# Patient Record
Sex: FEMALE | Race: WHITE | NOT HISPANIC OR LATINO | URBAN - METROPOLITAN AREA
[De-identification: names, ages, dates, MRNs, and addresses within clinical notes are randomized per-mention and may not be internally consistent; named-entity substitution may affect disease eponyms.]

---

## 2017-04-07 ENCOUNTER — OUTPATIENT (OUTPATIENT)
Dept: OUTPATIENT SERVICES | Facility: HOSPITAL | Age: 37
LOS: 1 days | End: 2017-04-07
Payer: COMMERCIAL

## 2017-04-07 VITALS
SYSTOLIC BLOOD PRESSURE: 117 MMHG | RESPIRATION RATE: 16 BRPM | WEIGHT: 192.02 LBS | HEIGHT: 66.5 IN | DIASTOLIC BLOOD PRESSURE: 78 MMHG | TEMPERATURE: 98 F | HEART RATE: 84 BPM

## 2017-04-07 DIAGNOSIS — Z01.818 ENCOUNTER FOR OTHER PREPROCEDURAL EXAMINATION: ICD-10-CM

## 2017-04-07 DIAGNOSIS — N20.0 CALCULUS OF KIDNEY: ICD-10-CM

## 2017-04-07 DIAGNOSIS — Z98.89 OTHER SPECIFIED POSTPROCEDURAL STATES: Chronic | ICD-10-CM

## 2017-04-07 DIAGNOSIS — N20.0 CALCULUS OF KIDNEY: Chronic | ICD-10-CM

## 2017-04-07 LAB
ANION GAP SERPL CALC-SCNC: 9 MMOL/L — SIGNIFICANT CHANGE UP (ref 5–17)
APPEARANCE UR: ABNORMAL
BILIRUB UR-MCNC: NEGATIVE — SIGNIFICANT CHANGE UP
BUN SERPL-MCNC: 12 MG/DL — SIGNIFICANT CHANGE UP (ref 7–23)
CALCIUM SERPL-MCNC: 8.8 MG/DL — SIGNIFICANT CHANGE UP (ref 8.5–10.1)
CHLORIDE SERPL-SCNC: 108 MMOL/L — SIGNIFICANT CHANGE UP (ref 96–108)
CO2 SERPL-SCNC: 22 MMOL/L — SIGNIFICANT CHANGE UP (ref 22–31)
COLOR SPEC: YELLOW — SIGNIFICANT CHANGE UP
CREAT SERPL-MCNC: 0.8 MG/DL — SIGNIFICANT CHANGE UP (ref 0.5–1.3)
DIFF PNL FLD: ABNORMAL
GLUCOSE SERPL-MCNC: 94 MG/DL — SIGNIFICANT CHANGE UP (ref 70–99)
GLUCOSE UR QL: NEGATIVE — SIGNIFICANT CHANGE UP
HCG SERPL-ACNC: <1 MIU/ML — SIGNIFICANT CHANGE UP
HCT VFR BLD CALC: 36.7 % — SIGNIFICANT CHANGE UP (ref 34.5–45)
HGB BLD-MCNC: 11.7 G/DL — SIGNIFICANT CHANGE UP (ref 11.5–15.5)
KETONES UR-MCNC: NEGATIVE — SIGNIFICANT CHANGE UP
LEUKOCYTE ESTERASE UR-ACNC: ABNORMAL
MCHC RBC-ENTMCNC: 29 PG — SIGNIFICANT CHANGE UP (ref 27–34)
MCHC RBC-ENTMCNC: 31.8 GM/DL — LOW (ref 32–36)
MCV RBC AUTO: 91.1 FL — SIGNIFICANT CHANGE UP (ref 80–100)
NITRITE UR-MCNC: POSITIVE
PH UR: 5 — SIGNIFICANT CHANGE UP (ref 4.8–8)
PLATELET # BLD AUTO: 388 K/UL — SIGNIFICANT CHANGE UP (ref 150–400)
POTASSIUM SERPL-MCNC: 3.5 MMOL/L — SIGNIFICANT CHANGE UP (ref 3.5–5.3)
POTASSIUM SERPL-SCNC: 3.5 MMOL/L — SIGNIFICANT CHANGE UP (ref 3.5–5.3)
PROT UR-MCNC: 25 MG/DL
RBC # BLD: 4.02 M/UL — SIGNIFICANT CHANGE UP (ref 3.8–5.2)
RBC # FLD: 15 % — HIGH (ref 10.3–14.5)
SODIUM SERPL-SCNC: 139 MMOL/L — SIGNIFICANT CHANGE UP (ref 135–145)
SP GR SPEC: 1.01 — SIGNIFICANT CHANGE UP (ref 1.01–1.02)
UROBILINOGEN FLD QL: NEGATIVE — SIGNIFICANT CHANGE UP
WBC # BLD: 7.9 K/UL — SIGNIFICANT CHANGE UP (ref 3.8–10.5)
WBC # FLD AUTO: 7.9 K/UL — SIGNIFICANT CHANGE UP (ref 3.8–10.5)

## 2017-04-07 PROCEDURE — 81001 URINALYSIS AUTO W/SCOPE: CPT

## 2017-04-07 PROCEDURE — 86850 RBC ANTIBODY SCREEN: CPT

## 2017-04-07 PROCEDURE — 87086 URINE CULTURE/COLONY COUNT: CPT

## 2017-04-07 PROCEDURE — 87186 SC STD MICRODIL/AGAR DIL: CPT

## 2017-04-07 PROCEDURE — G0463: CPT

## 2017-04-07 PROCEDURE — 84702 CHORIONIC GONADOTROPIN TEST: CPT

## 2017-04-07 PROCEDURE — 86901 BLOOD TYPING SEROLOGIC RH(D): CPT

## 2017-04-07 PROCEDURE — 80048 BASIC METABOLIC PNL TOTAL CA: CPT

## 2017-04-07 PROCEDURE — 86900 BLOOD TYPING SEROLOGIC ABO: CPT

## 2017-04-07 PROCEDURE — 85027 COMPLETE CBC AUTOMATED: CPT

## 2017-04-07 NOTE — H&P PST ADULT - ASSESSMENT
35 y/o healthy female with right kidney stone, ureteral stent placed in OCT 2016, now for stent exchange and cystoscopy and possible ureteroscopy. pre op testing today. medical eval not needed.

## 2017-04-07 NOTE — H&P PST ADULT - HISTORY OF PRESENT ILLNESS
37 y/o healthy female with the diagnosis of right kidney stone in Oct 2016, was in the hospital for few days with hematuria, nausea and vomiting stent was placed attempted lithotripsy but failed. Was sent home with right ureteral stent. Was seen by Dr Peñaloza for follow up, was advised to have the stent replaced.  Pre op testing today.

## 2017-04-07 NOTE — H&P PST ADULT - RS GEN PE MLT RESP DETAILS PC
clear to auscultation bilaterally/airway patent/breath sounds equal/normal/respirations non-labored/good air movement

## 2017-04-07 NOTE — H&P PST ADULT - FAMILY HISTORY
Grandparent  Still living? No  Family history of type 2 diabetes mellitus, Age at diagnosis: Age Unknown     Mother  Still living? Yes, Estimated age: 61-70  Family history of renal cancer, Age at diagnosis: Age Unknown     Father  Still living? Yes, Estimated age: 61-70  Family history of type 2 diabetes mellitus, Age at diagnosis: Age Unknown

## 2017-04-10 ENCOUNTER — INPATIENT (INPATIENT)
Facility: HOSPITAL | Age: 37
LOS: 0 days | Discharge: HOME | DRG: 690 | End: 2017-04-11
Attending: HOSPITALIST | Admitting: HOSPITALIST
Payer: COMMERCIAL

## 2017-04-10 VITALS
TEMPERATURE: 99 F | HEART RATE: 102 BPM | OXYGEN SATURATION: 100 % | WEIGHT: 192.02 LBS | SYSTOLIC BLOOD PRESSURE: 117 MMHG | RESPIRATION RATE: 16 BRPM | HEIGHT: 66 IN | DIASTOLIC BLOOD PRESSURE: 77 MMHG

## 2017-04-10 DIAGNOSIS — N20.0 CALCULUS OF KIDNEY: Chronic | ICD-10-CM

## 2017-04-10 DIAGNOSIS — N20.0 CALCULUS OF KIDNEY: ICD-10-CM

## 2017-04-10 DIAGNOSIS — Z98.89 OTHER SPECIFIED POSTPROCEDURAL STATES: Chronic | ICD-10-CM

## 2017-04-10 DIAGNOSIS — J45.909 UNSPECIFIED ASTHMA, UNCOMPLICATED: ICD-10-CM

## 2017-04-10 DIAGNOSIS — Z41.8 ENCOUNTER FOR OTHER PROCEDURES FOR PURPOSES OTHER THAN REMEDYING HEALTH STATE: ICD-10-CM

## 2017-04-10 DIAGNOSIS — N39.0 URINARY TRACT INFECTION, SITE NOT SPECIFIED: ICD-10-CM

## 2017-04-10 DIAGNOSIS — E03.9 HYPOTHYROIDISM, UNSPECIFIED: ICD-10-CM

## 2017-04-10 LAB
ALBUMIN SERPL ELPH-MCNC: 3.4 G/DL — SIGNIFICANT CHANGE UP (ref 3.3–5)
ALP SERPL-CCNC: 65 U/L — SIGNIFICANT CHANGE UP (ref 40–120)
ALT FLD-CCNC: 16 U/L — SIGNIFICANT CHANGE UP (ref 12–78)
ANION GAP SERPL CALC-SCNC: 10 MMOL/L — SIGNIFICANT CHANGE UP (ref 5–17)
APPEARANCE UR: ABNORMAL
APTT BLD: 27.9 SEC — SIGNIFICANT CHANGE UP (ref 27.5–37.4)
AST SERPL-CCNC: 12 U/L — LOW (ref 15–37)
BASOPHILS # BLD AUTO: 0.1 K/UL — SIGNIFICANT CHANGE UP (ref 0–0.2)
BASOPHILS NFR BLD AUTO: 0.8 % — SIGNIFICANT CHANGE UP (ref 0–2)
BILIRUB SERPL-MCNC: 0.3 MG/DL — SIGNIFICANT CHANGE UP (ref 0.2–1.2)
BILIRUB UR-MCNC: NEGATIVE — SIGNIFICANT CHANGE UP
BUN SERPL-MCNC: 10 MG/DL — SIGNIFICANT CHANGE UP (ref 7–23)
CALCIUM SERPL-MCNC: 9 MG/DL — SIGNIFICANT CHANGE UP (ref 8.5–10.1)
CHLORIDE SERPL-SCNC: 106 MMOL/L — SIGNIFICANT CHANGE UP (ref 96–108)
CO2 SERPL-SCNC: 23 MMOL/L — SIGNIFICANT CHANGE UP (ref 22–31)
COLOR SPEC: YELLOW — SIGNIFICANT CHANGE UP
CREAT SERPL-MCNC: 0.93 MG/DL — SIGNIFICANT CHANGE UP (ref 0.5–1.3)
DIFF PNL FLD: ABNORMAL
EOSINOPHIL # BLD AUTO: 0.1 K/UL — SIGNIFICANT CHANGE UP (ref 0–0.5)
EOSINOPHIL NFR BLD AUTO: 1 % — SIGNIFICANT CHANGE UP (ref 0–6)
GLUCOSE SERPL-MCNC: 142 MG/DL — HIGH (ref 70–99)
GLUCOSE UR QL: NEGATIVE — SIGNIFICANT CHANGE UP
HCG SERPL-ACNC: <1 MIU/ML — SIGNIFICANT CHANGE UP
HCT VFR BLD CALC: 35.8 % — SIGNIFICANT CHANGE UP (ref 34.5–45)
HGB BLD-MCNC: 11.3 G/DL — LOW (ref 11.5–15.5)
INR BLD: 1.28 RATIO — HIGH (ref 0.88–1.16)
KETONES UR-MCNC: ABNORMAL
LACTATE SERPL-SCNC: 1.3 MMOL/L — SIGNIFICANT CHANGE UP (ref 0.7–2)
LEUKOCYTE ESTERASE UR-ACNC: ABNORMAL
LIDOCAIN IGE QN: 72 U/L — LOW (ref 73–393)
LYMPHOCYTES # BLD AUTO: 1.3 K/UL — SIGNIFICANT CHANGE UP (ref 1–3.3)
LYMPHOCYTES # BLD AUTO: 11.2 % — LOW (ref 13–44)
MCHC RBC-ENTMCNC: 28.6 PG — SIGNIFICANT CHANGE UP (ref 27–34)
MCHC RBC-ENTMCNC: 31.5 GM/DL — LOW (ref 32–36)
MCV RBC AUTO: 90.8 FL — SIGNIFICANT CHANGE UP (ref 80–100)
MONOCYTES # BLD AUTO: 0.8 K/UL — SIGNIFICANT CHANGE UP (ref 0–0.9)
MONOCYTES NFR BLD AUTO: 6.7 % — SIGNIFICANT CHANGE UP (ref 1–9)
NEUTROPHILS # BLD AUTO: 9.3 K/UL — HIGH (ref 1.8–7.4)
NEUTROPHILS NFR BLD AUTO: 80.4 % — HIGH (ref 43–77)
NITRITE UR-MCNC: POSITIVE
PH UR: 5 — SIGNIFICANT CHANGE UP (ref 4.8–8)
PLATELET # BLD AUTO: 360 K/UL — SIGNIFICANT CHANGE UP (ref 150–400)
POTASSIUM SERPL-MCNC: 3.3 MMOL/L — LOW (ref 3.5–5.3)
POTASSIUM SERPL-SCNC: 3.3 MMOL/L — LOW (ref 3.5–5.3)
PROT SERPL-MCNC: 7.4 G/DL — SIGNIFICANT CHANGE UP (ref 6–8.3)
PROT UR-MCNC: 25 MG/DL
PROTHROM AB SERPL-ACNC: 14 SEC — HIGH (ref 9.8–12.7)
RBC # BLD: 3.94 M/UL — SIGNIFICANT CHANGE UP (ref 3.8–5.2)
RBC # FLD: 14.4 % — SIGNIFICANT CHANGE UP (ref 10.3–14.5)
SODIUM SERPL-SCNC: 139 MMOL/L — SIGNIFICANT CHANGE UP (ref 135–145)
SP GR SPEC: 1.02 — SIGNIFICANT CHANGE UP (ref 1.01–1.02)
UROBILINOGEN FLD QL: NEGATIVE — SIGNIFICANT CHANGE UP
WBC # BLD: 11.5 K/UL — HIGH (ref 3.8–10.5)
WBC # FLD AUTO: 11.5 K/UL — HIGH (ref 3.8–10.5)

## 2017-04-10 PROCEDURE — 74176 CT ABD & PELVIS W/O CONTRAST: CPT | Mod: 26

## 2017-04-10 PROCEDURE — 99285 EMERGENCY DEPT VISIT HI MDM: CPT

## 2017-04-10 PROCEDURE — 99223 1ST HOSP IP/OBS HIGH 75: CPT | Mod: AI,GC

## 2017-04-10 RX ORDER — ONDANSETRON 8 MG/1
4 TABLET, FILM COATED ORAL ONCE
Qty: 0 | Refills: 0 | Status: COMPLETED | OUTPATIENT
Start: 2017-04-10 | End: 2017-04-10

## 2017-04-10 RX ORDER — TAMSULOSIN HYDROCHLORIDE 0.4 MG/1
0.4 CAPSULE ORAL AT BEDTIME
Qty: 0 | Refills: 0 | Status: DISCONTINUED | OUTPATIENT
Start: 2017-04-10 | End: 2017-04-11

## 2017-04-10 RX ORDER — MORPHINE SULFATE 50 MG/1
4 CAPSULE, EXTENDED RELEASE ORAL ONCE
Qty: 0 | Refills: 0 | Status: DISCONTINUED | OUTPATIENT
Start: 2017-04-10 | End: 2017-04-10

## 2017-04-10 RX ORDER — MORPHINE SULFATE 50 MG/1
2 CAPSULE, EXTENDED RELEASE ORAL EVERY 4 HOURS
Qty: 0 | Refills: 0 | Status: DISCONTINUED | OUTPATIENT
Start: 2017-04-10 | End: 2017-04-11

## 2017-04-10 RX ORDER — AZTREONAM 2 G
1000 VIAL (EA) INJECTION ONCE
Qty: 0 | Refills: 0 | Status: COMPLETED | OUTPATIENT
Start: 2017-04-10 | End: 2017-04-10

## 2017-04-10 RX ORDER — SODIUM CHLORIDE 9 MG/ML
1000 INJECTION INTRAMUSCULAR; INTRAVENOUS; SUBCUTANEOUS ONCE
Qty: 0 | Refills: 0 | Status: COMPLETED | OUTPATIENT
Start: 2017-04-10 | End: 2017-04-10

## 2017-04-10 RX ORDER — DIPHENHYDRAMINE HCL 50 MG
25 CAPSULE ORAL ONCE
Qty: 0 | Refills: 0 | Status: COMPLETED | OUTPATIENT
Start: 2017-04-10 | End: 2017-04-10

## 2017-04-10 RX ORDER — SODIUM CHLORIDE 9 MG/ML
1000 INJECTION INTRAMUSCULAR; INTRAVENOUS; SUBCUTANEOUS
Qty: 0 | Refills: 0 | Status: DISCONTINUED | OUTPATIENT
Start: 2017-04-10 | End: 2017-04-11

## 2017-04-10 RX ADMIN — Medication 50 MILLIGRAM(S): at 19:57

## 2017-04-10 RX ADMIN — Medication 25 MILLIGRAM(S): at 23:47

## 2017-04-10 RX ADMIN — ONDANSETRON 4 MILLIGRAM(S): 8 TABLET, FILM COATED ORAL at 20:52

## 2017-04-10 RX ADMIN — MORPHINE SULFATE 4 MILLIGRAM(S): 50 CAPSULE, EXTENDED RELEASE ORAL at 20:52

## 2017-04-10 RX ADMIN — MORPHINE SULFATE 4 MILLIGRAM(S): 50 CAPSULE, EXTENDED RELEASE ORAL at 20:53

## 2017-04-10 RX ADMIN — SODIUM CHLORIDE 1000 MILLILITER(S): 9 INJECTION INTRAMUSCULAR; INTRAVENOUS; SUBCUTANEOUS at 19:58

## 2017-04-10 NOTE — H&P ADULT - PROBLEM SELECTOR PLAN 2
continue aztreonam per urology due to pt allergies   f/u urine cx  Urology Consult-[facility] continue bactrim bid-   f/u urine cx  [facility]-   f/u urine cx  Urology Consult-[facility]- per Dr Billy- pt to go for stent after adequately treated for UTI

## 2017-04-10 NOTE — ED PROVIDER NOTE - OBJECTIVE STATEMENT
35 yo female hx of renal stone with ureteral stent on right c/o right sided kidney/flank pain fever/chills x 2 days, tmax 102.3, taking tylenol, seen at urgent care, +UTI, not on any antibiotics.  No PMD. Was scheduled to have stent replaced on Friday.

## 2017-04-10 NOTE — H&P ADULT - NSHPPHYSICALEXAM_GEN_ALL_CORE
CONSTITUTIONAL: Well appearing, well nourished,  AAOx3 and in no apparent distress.   HEENT: PERRLA, EOMI, conjunctival erythema.    CARDIAC: Normal rate, regular rhythm.  Heart sounds S1, S2.  RESPIRATORY: Breath sounds clear and equal bilaterally.  GASTROINTESTINAL: Abdomen soft, non-tender, no guarding, +BS  :   MUSCULOSKELETAL: Spine appears normal, range of motion is not limited, no muscle or joint tenderness  NEUROLOGICAL: Alert and oriented, no focal deficits, no motor or sensory deficits.  SKIN: warm and dry  EXT: No clubbing, cyanosis, Edema.    PSYCHIATRIC: Normal mood, affect.

## 2017-04-10 NOTE — H&P ADULT - ASSESSMENT
35 y/o F with PMHx of Hypothyroidism, asthma, cholelithiasis, renal calculus s/p lithotripsy, R ureteral stent presents to ED with 2 day history of R flank pain, fever, chills admitted for R nephrolithiasis

## 2017-04-10 NOTE — ED ADULT TRIAGE NOTE - CHIEF COMPLAINT QUOTE
patient c/o UTI and fever for the past 24 hours. positive UTI at urgent care today. patient has stent r/t kidney stone since october which she stated is scheduled to be replaced Friday due to multiple UTIs. patient denies nausea, vomiting, dizziness, hematuria, dysuria. patient states her urine is dark.

## 2017-04-10 NOTE — H&P ADULT - PROBLEM SELECTOR PLAN 1
admit to [facility]  Pain control-Morphine  flomax  strain urine  f/u cbc. bmp, cx  continue aztreonam per urology due to pt allergies   Urology Consult-Dr Billy admit to [facility]  Pain control-Morphine 2mg q4 prn  flomax  strain urine  f/u cbc. bmp, cx  continue bactrim bid- pt reports possible allergy to azactam (lump in throat)  Urology Consult-Dr Billy

## 2017-04-10 NOTE — ED ADULT NURSE NOTE - PSH
H/O lithotripsy    H/O:  Section x 1  in   History of Cholecystectomy  in   Kidney stone on right side  Oct 2016  S/P nasal septoplasty  in

## 2017-04-10 NOTE — H&P ADULT - NSHPREVIEWOFSYSTEMS_GEN_ALL_CORE
CONSTITUTIONAL: + fever and chills. no weakness  CARDIOVASCULAR: normal rate and rhythm, no chest pain and no edema.  RESPIRATORY: no chest pain, no cough. no SOB  GASTROINTESTINAL: no abdominal pain, no nausea, no vomiting, no diarrhea  GENITOURINARY- + dysuria, no hematuria + R flank pain  MUSCULOSKELETAL: no back pain, no musculoskeletal pain, no neck pain  NEURO: no loss of consciousness, no gait abnormality, no headache, no sensory deficits.  PSYCHIATRIC: no known mental health issues. CONSTITUTIONAL: + fever and chills. no weakness  CARDIOVASCULAR: normal rate and rhythm, no chest pain and no edema.  RESPIRATORY: no chest pain, no cough. no SOB  GASTROINTESTINAL: no abdominal pain, no nausea, no vomiting, no diarrhea  GENITOURINARY- + dysuria, + dark urine, no hematuria + R flank pain  MUSCULOSKELETAL: no back pain, no musculoskeletal pain, no neck pain  NEURO: no loss of consciousness, no gait abnormality, no headache, no sensory deficits.  PSYCHIATRIC: no known mental health issues.

## 2017-04-10 NOTE — ED PROVIDER NOTE - CARE PLAN
Principal Discharge DX:	Pyelonephritis Principal Discharge DX:	Urinary tract infection without hematuria, site unspecified  Secondary Diagnosis:	Renal stone

## 2017-04-10 NOTE — H&P ADULT - FAMILY HISTORY
<<-----Click on this checkbox to enter Family History Family history of renal cancer     Family history of type 2 diabetes mellitus     Grandparent  Still living? No  Family history of type 2 diabetes mellitus, Age at diagnosis: Age Unknown

## 2017-04-10 NOTE — H&P ADULT - HISTORY OF PRESENT ILLNESS
37 y/o F with PMHx of Hypothyroidism, asthma, cholelithiasis, renal calculus s/p lithotripsy, R ureteral stent presents to ED with 2 day history of R flank pain, fever, chills.  Pt reports she has had similar symptoms in the past with kidney stones.  Was seen at urgent care, diagnosed with UTI but not given antibiotics.  Was scheduled to have ureteral stent placed on Friday.    Pt denies HA, CP, SOB, GARCIA, abd pain, n/v/d, hematuria, numbness, tingling.          In the ED, patient received Azactam 1,000mg IV, 1L NS bolus, benadryl 25mg, morphine 4mg iv, zofran 4mg iv.  Was seen by Dr Arellano-Urology in ED 37 y/o F with PMHx of Hypothyroidism, asthma, cholelithiasis, renal calculus s/p lithotripsy, R ureteral stent presents to ED with 2 day history of R flank pain, fever, chills.  Pt reports she has had similar symptoms in the past with kidney stones.  Was seen at urgent care, diagnosed with UTI but not given antibiotics.  Was scheduled to have ureteral stent placed on Friday.    Pt denies HA, CP, SOB, GARCIA, abd pain, n/v/d, hematuria, numbness, tingling.          In the ED, patient received Azactam 1,000mg IV, 1L NS bolus, benadryl 25mg, morphine 4mg iv, zofran 4mg iv.  Was seen by Dr Arellano-Urology in ED      [facility] [place] [facility]:  IMPRESSION:  Since prior evaluation a right ureteral stent has been   advanced. There is mild dilatation of the right intrarenal collecting   system, less prominent than prior evaluation. Mild infiltration of the   right perirenal fat persists. There are multiple calculi measuring up to   5 mm identified within the right renal pelvis region, increased in   number, but decreased in size. Multiple calculi are identified within the   upper pole region of the right kidney measuring up to 5 mm, increased.   Calculi measuring up to 6 mm are identified within the mid pole region of   the right kidney, decreased in number. 37 y/o F with PMHx of Hypothyroidism, asthma, cholelithiasis, renal calculus s/p lithotripsy, R ureteral stent presents to ED with 2 day history of R flank pain, fever, chills.  Pt reports she has had similar symptoms in the past with kidney stones. Of note, patient had lithotripsy x3 a few months ago, as well as a R ureteral stent placed by Dr Peñaloza-uro.  Pt is scheduled to have ureteral stent placed on Friday with Dr Peñaloza.  Pt also reports dry mouth and dark urine, denies blood in urine.  Pt believes she may have a UTI, has history of same.   Pt denies HA, CP, SOB, GARCIA, abd pain, n/v/d, hematuria, numbness, tingling.          In the ED, patient received Azactam 1,000mg IV, 1L NS bolus, benadryl 25mg, morphine 4mg iv, zofran 4mg iv.  Was seen by Dr Arellano-Urology in ED  Patient labs notable for WBC – 11.5, hemoglobin 11.3, MCHC – 31.5, auto neutrophil percentage – 80.4, PT – 14.0, INR – 1.28, potassium – 3.3, glucose – 142,   UA – WBC greater than 50, RBC – 325, bacteria-many, nitrite positive, leukocyte esterase – moderate, trace ketones, turbid urine, protein 25. ABO interpretation – A positive, antibody negative, urine culture from 4/7/2017 – greater than hundred thousand E. coli.    [facility] [place]:  IMPRESSION:  Since prior evaluation a right ureteral stent has been   advanced. There is mild dilatation of the right intrarenal collecting   system, less prominent than prior evaluation. Mild infiltration of the   right perirenal fat persists. There are multiple calculi measuring up to   5 mm identified within the right renal pelvis region, increased in   number, but decreased in size. Multiple calculi are identified within the   upper pole region of the right kidney measuring up to 5 mm, increased.   Calculi measuring up to 6 mm are identified within the mid pole region of   the right kidney, decreased in number.

## 2017-04-10 NOTE — H&P ADULT - NSHPSOCIALHISTORY_GEN_ALL_CORE
SOCIAL HISTORY:  Smoker: [ ] Yes  [x ] No         ETOH use: [ ] Yes  [x ] No              FREQUENCY / QUANTITY:   Ilicit Drug use:  [ ] Yes  [x ] No  Occupation:   Live with: SOCIAL HISTORY:  Smoker: [ ] Yes  [x ] No         ETOH use: [ ] Yes  [x ] No              FREQUENCY / QUANTITY:   Ilicit Drug use:  [ ] Yes  [x ] No

## 2017-04-10 NOTE — H&P ADULT - NSHPLABSRESULTS_GEN_ALL_CORE
11.3   11.5  )-----------( 360      ( 10 Apr 2017 20:07 )             35.8       04-10    139  |  106  |  10  ----------------------------<  142<H>  3.3<L>   |  23  |  0.93    Ca    9.0      10 Apr 2017 20:07    TPro  7.4  /  Alb  3.4  /  TBili  0.3  /  DBili  x   /  AST  12<L>  /  ALT  16  /  AlkPhos  65  04-10      CAPILLARY BLOOD GLUCOSE      I&O's Summary      PT/INR - ( 10 Apr 2017 20:07 )   PT: 14.0 sec;   INR: 1.28 ratio         PTT - ( 10 Apr 2017 20:07 )  PTT:27.9 sec 11.3   11.5  )-----------( 360      ( 10 Apr 2017 20:07 )             35.8       04-10    139  |  106  |  10  ----------------------------<  142<H>  3.3<L>   |  23  |  0.93    Ca    9.0      10 Apr 2017 20:07    TPro  7.4  /  Alb  3.4  /  TBili  0.3  /  DBili  x   /  AST  12<L>  /  ALT  16  /  AlkPhos  65  04-10      CAPILLARY BLOOD GLUCOSE      I&O's Summary      [facility] [place]:  IMPRESSION:  Since prior evaluation a right ureteral stent has been   advanced. There is mild dilatation of the right intrarenal collecting   system, less prominent than prior evaluation. Mild infiltration of the   right perirenal fat persists. There are multiple calculi measuring up to   5 mm identified within the right renal pelvis region, increased in   number, but decreased in size. Multiple calculi are identified within the   upper pole region of the right kidney measuring up to 5 mm, increased.   Calculi measuring up to 6 mm are identified within the mid pole region of   the right kidney, decreased in number.       PT/INR - ( 10 Apr 2017 20:07 )   PT: 14.0 sec;   INR: 1.28 ratio         PTT - ( 10 Apr 2017 20:07 )  PTT:27.9 sec

## 2017-04-11 ENCOUNTER — TRANSCRIPTION ENCOUNTER (OUTPATIENT)
Age: 37
End: 2017-04-11

## 2017-04-11 VITALS
SYSTOLIC BLOOD PRESSURE: 101 MMHG | DIASTOLIC BLOOD PRESSURE: 64 MMHG | HEART RATE: 83 BPM | TEMPERATURE: 98 F | RESPIRATION RATE: 16 BRPM | OXYGEN SATURATION: 98 %

## 2017-04-11 DIAGNOSIS — Z96.0 PRESENCE OF UROGENITAL IMPLANTS: ICD-10-CM

## 2017-04-11 DIAGNOSIS — N10 ACUTE PYELONEPHRITIS: ICD-10-CM

## 2017-04-11 LAB
ANION GAP SERPL CALC-SCNC: 8 MMOL/L — SIGNIFICANT CHANGE UP (ref 5–17)
BUN SERPL-MCNC: 8 MG/DL — SIGNIFICANT CHANGE UP (ref 7–23)
CALCIUM SERPL-MCNC: 8 MG/DL — LOW (ref 8.5–10.1)
CHLORIDE SERPL-SCNC: 111 MMOL/L — HIGH (ref 96–108)
CO2 SERPL-SCNC: 23 MMOL/L — SIGNIFICANT CHANGE UP (ref 22–31)
CREAT SERPL-MCNC: 0.74 MG/DL — SIGNIFICANT CHANGE UP (ref 0.5–1.3)
GLUCOSE SERPL-MCNC: 85 MG/DL — SIGNIFICANT CHANGE UP (ref 70–99)
HCT VFR BLD CALC: 32.3 % — LOW (ref 34.5–45)
HGB BLD-MCNC: 9.9 G/DL — LOW (ref 11.5–15.5)
MCHC RBC-ENTMCNC: 27.5 PG — SIGNIFICANT CHANGE UP (ref 27–34)
MCHC RBC-ENTMCNC: 30.5 GM/DL — LOW (ref 32–36)
MCV RBC AUTO: 90.3 FL — SIGNIFICANT CHANGE UP (ref 80–100)
PLATELET # BLD AUTO: 334 K/UL — SIGNIFICANT CHANGE UP (ref 150–400)
POTASSIUM SERPL-MCNC: 3.8 MMOL/L — SIGNIFICANT CHANGE UP (ref 3.5–5.3)
POTASSIUM SERPL-SCNC: 3.8 MMOL/L — SIGNIFICANT CHANGE UP (ref 3.5–5.3)
RBC # BLD: 3.58 M/UL — LOW (ref 3.8–5.2)
RBC # FLD: 14.5 % — SIGNIFICANT CHANGE UP (ref 10.3–14.5)
SODIUM SERPL-SCNC: 142 MMOL/L — SIGNIFICANT CHANGE UP (ref 135–145)
WBC # BLD: 9.3 K/UL — SIGNIFICANT CHANGE UP (ref 3.8–10.5)
WBC # FLD AUTO: 9.3 K/UL — SIGNIFICANT CHANGE UP (ref 3.8–10.5)

## 2017-04-11 PROCEDURE — 99285 EMERGENCY DEPT VISIT HI MDM: CPT | Mod: 25

## 2017-04-11 PROCEDURE — 99239 HOSP IP/OBS DSCHRG MGMT >30: CPT

## 2017-04-11 PROCEDURE — 87086 URINE CULTURE/COLONY COUNT: CPT

## 2017-04-11 PROCEDURE — 84702 CHORIONIC GONADOTROPIN TEST: CPT

## 2017-04-11 PROCEDURE — 81001 URINALYSIS AUTO W/SCOPE: CPT

## 2017-04-11 PROCEDURE — 86901 BLOOD TYPING SEROLOGIC RH(D): CPT

## 2017-04-11 PROCEDURE — 85027 COMPLETE CBC AUTOMATED: CPT

## 2017-04-11 PROCEDURE — 80048 BASIC METABOLIC PNL TOTAL CA: CPT

## 2017-04-11 PROCEDURE — 85610 PROTHROMBIN TIME: CPT

## 2017-04-11 PROCEDURE — 87186 SC STD MICRODIL/AGAR DIL: CPT

## 2017-04-11 PROCEDURE — 80053 COMPREHEN METABOLIC PANEL: CPT

## 2017-04-11 PROCEDURE — 86900 BLOOD TYPING SEROLOGIC ABO: CPT

## 2017-04-11 PROCEDURE — 86850 RBC ANTIBODY SCREEN: CPT

## 2017-04-11 PROCEDURE — 74176 CT ABD & PELVIS W/O CONTRAST: CPT

## 2017-04-11 PROCEDURE — 96375 TX/PRO/DX INJ NEW DRUG ADDON: CPT

## 2017-04-11 PROCEDURE — 85730 THROMBOPLASTIN TIME PARTIAL: CPT

## 2017-04-11 PROCEDURE — 83690 ASSAY OF LIPASE: CPT

## 2017-04-11 PROCEDURE — 87040 BLOOD CULTURE FOR BACTERIA: CPT

## 2017-04-11 PROCEDURE — 83605 ASSAY OF LACTIC ACID: CPT

## 2017-04-11 PROCEDURE — 96365 THER/PROPH/DIAG IV INF INIT: CPT

## 2017-04-11 RX ORDER — LEVOTHYROXINE SODIUM 125 MCG
88 TABLET ORAL DAILY
Qty: 0 | Refills: 0 | Status: DISCONTINUED | OUTPATIENT
Start: 2017-04-11 | End: 2017-04-11

## 2017-04-11 RX ORDER — ACETAMINOPHEN 500 MG
650 TABLET ORAL ONCE
Qty: 0 | Refills: 0 | Status: COMPLETED | OUTPATIENT
Start: 2017-04-11 | End: 2017-04-11

## 2017-04-11 RX ORDER — BENZOCAINE AND MENTHOL 5; 1 G/100ML; G/100ML
1 LIQUID ORAL ONCE
Qty: 0 | Refills: 0 | Status: COMPLETED | OUTPATIENT
Start: 2017-04-11 | End: 2017-04-11

## 2017-04-11 RX ORDER — TAMSULOSIN HYDROCHLORIDE 0.4 MG/1
1 CAPSULE ORAL
Qty: 14 | Refills: 0 | OUTPATIENT
Start: 2017-04-11 | End: 2017-04-25

## 2017-04-11 RX ADMIN — MORPHINE SULFATE 2 MILLIGRAM(S): 50 CAPSULE, EXTENDED RELEASE ORAL at 00:34

## 2017-04-11 RX ADMIN — SODIUM CHLORIDE 50 MILLILITER(S): 9 INJECTION INTRAMUSCULAR; INTRAVENOUS; SUBCUTANEOUS at 00:34

## 2017-04-11 RX ADMIN — Medication 88 MICROGRAM(S): at 05:45

## 2017-04-11 RX ADMIN — Medication 650 MILLIGRAM(S): at 11:15

## 2017-04-11 RX ADMIN — MORPHINE SULFATE 2 MILLIGRAM(S): 50 CAPSULE, EXTENDED RELEASE ORAL at 00:50

## 2017-04-11 RX ADMIN — SODIUM CHLORIDE 50 MILLILITER(S): 9 INJECTION INTRAMUSCULAR; INTRAVENOUS; SUBCUTANEOUS at 07:04

## 2017-04-11 RX ADMIN — MORPHINE SULFATE 2 MILLIGRAM(S): 50 CAPSULE, EXTENDED RELEASE ORAL at 06:00

## 2017-04-11 RX ADMIN — Medication 650 MILLIGRAM(S): at 12:24

## 2017-04-11 RX ADMIN — BENZOCAINE AND MENTHOL 1 LOZENGE: 5; 1 LIQUID ORAL at 01:30

## 2017-04-11 RX ADMIN — Medication 1 TABLET(S): at 07:04

## 2017-04-11 RX ADMIN — MORPHINE SULFATE 2 MILLIGRAM(S): 50 CAPSULE, EXTENDED RELEASE ORAL at 05:41

## 2017-04-11 NOTE — CONSULT NOTE ADULT - ASSESSMENT
Febrile UTI in patient with known right kidney stone and right ureteral stent
33 yo female with rt ureteral stent and recurrent UTIs presenting now with fever, systemic symptoms, Rt CVA tenderness, pyuria and positive urinary culture

## 2017-04-11 NOTE — DISCHARGE NOTE ADULT - PLAN OF CARE
stable continue bactrim for 14 days post stent replacement   plan for urology to replace stent as scheduled continue home medications

## 2017-04-11 NOTE — DISCHARGE NOTE ADULT - HOSPITAL COURSE
35 y/o F with PMHx of Hypothyroidism, asthma, cholelithiasis, renal calculus s/p lithotripsy, R ureteral stent presents to ED with 2 day history of R flank pain, fever, chills.  Pt reports she has had similar symptoms in the past with kidney stones. Of note, patient had lithotripsy x3 a few months ago, as well as a R ureteral stent placed by Dr Peñaloza-uro.  Pt is scheduled to have ureteral stent placed on Friday with Dr Peñaloza.  Pt also reports dry mouth and dark urine, denies blood in urine.  Pt believes she may have a UTI, has history of same.   Pt denies HA, CP, SOB, GARCIA, abd pain, n/v/d, hematuria, numbness, tingling.          In the ED, patient received Azactam 1,000mg IV, 1L NS bolus, benadryl 25mg, morphine 4mg iv, zofran 4mg iv.  Was seen by Dr Arellano-Urology in ED  Patient labs notable for WBC – 11.5, hemoglobin 11.3, MCHC – 31.5, auto neutrophil percentage – 80.4, PT – 14.0, INR – 1.28, potassium – 3.3, glucose – 142,   UA – WBC greater than 50, RBC – 325, bacteria-many, nitrite positive, leukocyte esterase – moderate, trace ketones, turbid urine, protein 25. ABO interpretation – A positive, antibody negative, urine culture from 4/7/2017 – greater than hundred thousand E. coli.    Ct [facility] [place]:  IMPRESSION:  Since prior evaluation a right ureteral stent has been   advanced. There is mild dilatation of the right intrarenal collecting   system, less prominent than prior evaluation. Mild infiltration of the   right perirenal fat persists. There are multiple calculi measuring up to   5 mm identified within the right renal pelvis region, increased in   number, but decreased in size. Multiple calculi are identified within the   upper pole region of the right kidney measuring up to 5 mm, increased.   Calculi measuring up to 6 mm are identified within the mid pole region of   the right kidney, decreased in number.    Patient was admitted to hospital for acute pyelonephritis. ID evaluated patients and recommend 14 days of therapy and if stent can be changed or removed would continue treatment 2 weeks past this point. Urology evaluated patient as well will plan for ureteral stent replacement friday as scheduled.  Patient remains hemodynamically stable and can discharge to home with Urology follow up as scheduled.    Vital Signs Last 24 Hrs  T(C): 36.6, Max: 37.3 (04-10 @ 19:06)  T(F): 97.9, Max: 99.2 (04-10 @ 19:06)  HR: 93 (77 - 102)  BP: 92/56 (92/56 - 117/77)  BP(mean): --  RR: 17 (14 - 17)  SpO2: 98% (98% - 100%)    General: WN/WD NAD  Neurology: A&Ox3, nonfocal, REILLY x 4  Respiratory: CTA B/L  CV: RRR, S1S2, no murmurs, rubs or gallops  Abdominal: Soft, NT, ND +BS, Last BM  Extremities: No edema, + peripheral pulses

## 2017-04-11 NOTE — CONSULT NOTE ADULT - SUBJECTIVE AND OBJECTIVE BOX
Patient is a 36y old  Female who presents with a chief complaint of fevers, chills, right flank pain    HISTORY OF PRESENT ILLNESS:  35 y/o female with h/o right kidney stone, treated by Dr. Peñaloza with ESWL 10/28/16, right stent placement 16, and 2 subsequent ESWL's, last one in January.  Pt is due to have ureteral stent change on 17, but she presented to ER tonight c/o fever of 102.3 F, chills, and right flank pain.    PAST MEDICAL & SURGICAL HISTORY:  Cervical incompetence  Renal calculus  Hypothyroidism  Cholelithiasis  Normal Pregnancy  Asthma  Kidney stone on right side: Oct 2016  H/O lithotripsy  S/P nasal septoplasty: in   History of Cholecystectomy: in   H/O:  Section x 1: in       REVIEW OF SYSTEMS:    CONSTITUTIONAL: No weakness, fevers or chills  SKIN: No itching, burning, rashes, or lesions   EYES/ENT: No visual changes;  No vertigo or throat pain   NECK: No pain or stiffness  RESPIRATORY: No cough, wheezing, hemoptysis; No shortness of breath  CARDIOVASCULAR: No chest pain or palpitations  GASTROINTESTINAL: No abdominal or epigastric pain. No nausea, vomiting, diarrhea  NEUROLOGICAL: No numbness or weakness  GENITOURINARY:     No hematuria, dysuria, incontinence    All other review of systems is negative unless indicated above.    MEDICATIONS  (STANDING):  diphenhydrAMINE   Injectable 25milliGRAM(s) IV Push Once    MEDICATIONS  (PRN):      Allergies    doxycycline (Hives)  Levaquin (Hives)  penicillin (Hives)    SOCIAL HISTORY:   Smoking: Never   EtOH: Occasional   Work: [employer]       FAMILY HISTORY:  Family history of type 2 diabetes mellitus (Father)  Family history of renal cancer (Mother)  Family history of type 2 diabetes mellitus (Grandparent)      Vital Signs Last 24 Hrs  T(C): 37.3, Max: 37.3 (04-10 @ 19:06)  T(F): 99.2, Max: 99.2 (04-10 @ 19:06)  HR: 102 (102 - 102)  BP: 117/77 (117/77 - 117/77)  BP(mean): --  RR: 16 (16 - 16)  SpO2: 100% (100% - 100%)    PHYSICAL EXAM:    Constitutional: NAD, well-developed  HEENT: PERRLA, EOMI  Neck: Supple, full ROM  Back: (+) Rt CVA tenderness  Respiratory: Normal repiratory effort  Cardiovascular: RRR  Abd: Soft, NT/ND  Extremities: No peripheral edema  Vascular: 2+ peripheral pulses  Neurological: A&O x 3, no focal deficits  Psychiatric: Normal mood, normal affect  Musculoskeletal: no clubbing/cyanosis/edema  Skin: No rashes    LABS:                        11.3   11.5  )-----------( 360      ( 10 Apr 2017 20:07 )             35.8     04-10    139  |  106  |  10  ----------------------------<  142<H>  3.3<L>   |  23  |  0.93    Ca    9.0      10 Apr 2017 20:07    TPro  7.4  /  Alb  3.4  /  TBili  0.3  /  DBili  x   /  AST  12<L>  /  ALT  16  /  AlkPhos  65  04-10      Urinalysis Basic - ( 10 Apr 2017 20:08 )    Color: x / Appearance: x / SG: x / pH: x  Gluc: x / Ketone: x  / Bili: x / Urobili: x   Blood: x / Protein: x / Nitrite: x   Leuk Esterase: x / RBC: 3-5 /HPF / WBC >50   Sq Epi: x / Non Sq Epi: Few / Bacteria: Many    RADIOLOGY & ADDITIONAL STUDIES:  EXAM:  CT RENAL [place]                            PROCEDURE DATE:  04/10/2017        INTERPRETATION:  CLINICAL HISTORY:  Right flank pain/fever    Multiple axial images of the abdomen and pelvis were obtained from the   lung bases through pubicsymphysis without the administration of oral or   intravascular contrast. Reformatted coronal and sagittal images are   submitted.    COMPARISON: 2016    FINDINGS:    No focal hepatic masses are identified on this noncontrast evaluation.   There is no evidence for intrahepatic or extrahepatic biliary dilatation.   The patient is status post cholecystectomy.    The spleen, pancreas and adrenal glands are unremarkable.    Since prior evaluation a right ureteral stent has been advanced. There is   mild dilatation of the right intrarenal collecting system, less prominent   than prior evaluation. Mild infiltration of the right perirenal fat   persists. There are multiple calculi measuring up to 5 mm identified   within the right renal pelvis region, increased in number, but decreased   in size. Multiple calculi are identified within the upper pole region of   the right kidney measuring up to 5 mm, increased. Calculi measuring up to   6 mm are identified within the mid pole region of the right kidney,   decreased in number. There is no evidence for left-sided hydronephrosis,   left renal calculi. No space-occupying lesions of the renal parenchyma   identified.    The abdominal aorta is normal in course and caliber. No abnormally  enlarged retroperitoneal or pelvic lymphadenopathy is noted.    Fecal material is scattered throughout the colon. There is no evidence   for mechanical bowel obstruction. No colonic wall thickening is   identified. There is no evidence for free intraperitoneal air or fluid.   There is no evidence for acute appendicitis.    The uterus and urinary bladder appear unremarkable.     Imaging of the lung bases appears unremarkable. A nonspecific sclerotic   focus is identified within the right iliac bone as well as L4 vertebral   body, stable.    IMPRESSION:  Since prior evaluation a right ureteral stent has been   advanced. There is mild dilatation of the right intrarenal collecting   system, less prominent than prior evaluation. Mild infiltration of the   right perirenal fat persists. There are multiple calculi measuring up to   5 mm identified within the right renal pelvis region, increased in   number, but decreased in size. Multiple calculi are identified within the   upper pole region ofthe right kidney measuring up to 5 mm, increased.   Calculi measuring up to 6 mm are identified within the mid pole region of   the right kidney, decreased in number.
HPI:  35 y/o F presents to ED with 2 day history of R flank pain, fever, chills.  Pt reports she has had similar symptoms in the past with kidney stones. Of note, patient had lithotripsy x3 a few months ago, as well as a R ureteral stent placed by Dr Peñaloza-uro. .   Pt denies HA, CP, SOB, GARCIA, abd pain, n/v/d, hematuria, numbness, tingling.     PT reports that she has had urinary infections recently and both times treated with 5 days of Bactrim but subsequent return of symptoms and positive cultures.        In the ED, patient received Azactam 1,000mg IV, 1L NS bolus, benadryl 25mg, morphine 4mg iv, zofran 4mg iv.  Was seen by Dr Arellano-Urology in ED  Patient labs notable for WBC – 11.5, hemoglobin 11.3, MCHC – 31.5, auto neutrophil percentage – 80.4, PT – 14.0, INR – 1.28, potassium – 3.3, glucose – 142,   UA – WBC greater than 50, RBC – 325, bacteria-many, nitrite positive, leukocyte esterase – moderate, trace ketones, turbid urine, protein 25. ABO interpretation – A positive, antibody negative, urine culture from 2017 – greater than hundred thousand E. coli.    Ct [facility] [place]:  IMPRESSION:  Since prior evaluation a right ureteral stent has been   advanced. There is mild dilatation of the right intrarenal collecting   system, less prominent than prior evaluation. Mild infiltration of the   right perirenal fat persists. There are multiple calculi measuring up to   5 mm identified within the right renal pelvis region, increased in   number, but decreased in size. Multiple calculi are identified within the   upper pole region of the right kidney measuring up to 5 mm, increased.   Calculi measuring up to 6 mm are identified within the mid pole region of   the right kidney, decreased in number. (10 Apr 2017 22:25)      PAST MEDICAL & SURGICAL HISTORY:  Cervical incompetence  Renal calculus  Hypothyroidism  Cholelithiasis  Normal Pregnancy  Asthma  Kidney stone on right side: Oct 2016  H/O lithotripsy  S/P nasal septoplasty: in   History of Cholecystectomy: in   H/O:  Section x 1: in           MEDICATIONS  (STANDING):  sodium chloride 0.9%. 1000milliLiter(s) IV Continuous <Continuous>  tamsulosin 0.4milliGRAM(s) Oral at bedtime  levothyroxine 88MICROGram(s) Oral daily  trimethoprim  160 mG/sulfamethoxazole 800 mG 1Tablet(s) Oral two times a day    MEDICATIONS  (PRN):  morphine  - Injectable 2milliGRAM(s) IV Push every 4 hours PRN Severe Pain (7 - 10)      Allergies    doxycycline (Hives)  Levaquin (Hives)  penicillin (Hives)    Intolerances        SOCIAL HISTORY:    FAMILY HISTORY:  Family history of type 2 diabetes mellitus  Family history of renal cancer  Family history of type 2 diabetes mellitus (Grandparent)      Vital Signs Last 24 Hrs  T(C): 36.6, Max: 37.3 (04-10 @ 19:06)  T(F): 97.9, Max: 99.2 (04-10 @ 19:06)  HR: 93 (77 - 102)  BP: 92/56 (92/56 - 117/77)  BP(mean): --  RR: 17 (14 - 17)  SpO2: 98% (98% - 100%)    PE:  WDWN in no distress  HEENT:  NC, PERRL, sclerae anicteric, conjunctivae clear, EOMI.  Sinuses nontender, no nasal exudate.  No buccal or pharyngeal lesions, erythema or exudate  Neck:  Supple, no adenopathy  Lungs:  Clear to auscultation  Cor:  RRR, S1, S2, no murmur appreciated  Abd:  Symmetric, normoactive BS.  Soft, tender on deep palpation, no masses, guarding or rebound.  Liver and spleen not enlarged, RT CVA tenderness noted  Extrem:  No cyanosis or edema  Skin:  No rashes.    LABS:                        9.9    9.3   )-----------( 334      ( 2017 07:51 )             32.3     04-11    142  |  111<H>  |  8   ----------------------------<  85  3.8   |  23  |  0.74    Ca    8.0<L>      2017 07:51    TPro  7.4  /  Alb  3.4  /  TBili  0.3  /  DBili  x   /  AST  12<L>  /  ALT  16  /  AlkPhos  65  04-10    Urinalysis Basic - ( 10 Apr 2017 20:08 )    Color: x / Appearance: x / SG: x / pH: x  Gluc: x / Ketone: x  / Bili: x / Urobili: x   Blood: x / Protein: x / Nitrite: x   Leuk Esterase: x / RBC: 3-5 /HPF / WBC >50   Sq Epi: x / Non Sq Epi: Few / Bacteria: Many        MICROBIOLOGY:    Culture - Urine (17 @ 13:34)    -  Amikacin: S <=16    -  Cefepime: S <=4    -  Ceftriaxone: S <=1    -  Ciprofloxacin: R >2    -  Ertapenem: S <=1    -  Imipenem: S <=1    -  Tobramycin: I 8    -  Ampicillin: R >16    -  Ampicillin/Sulbactam: R >16/8    -  Cefoxitin: S <=8    -  Levofloxacin: R >4    -  Trimethoprim/Sulfamethoxazole: S <=2/38    -  Ceftazidime: S <=1    -  Nitrofurantoin: S <=32    -  Aztreonam: S <=4    -  Cefazolin: R >16    -  Gentamicin: R >8    -  Meropenem: S <=1    -  Piperacillin/Tazobactam: I 64    Specimen Source: .Urine Clean Catch (Midstream)    Culture Results:   >100,000 CFU/ml Escherichia coli    Organism Identification: Escherichia coli    Organism: Escherichia coli    Method Type: TATUM        RADIOLOGY & ADDITIONAL STUDIES:    --

## 2017-04-11 NOTE — PROGRESS NOTE ADULT - SUBJECTIVE AND OBJECTIVE BOX
CHIEF COMPLAINT:     HISTORY OF PRESENT ILLNESS:  35 yo female s/p right u stent and subsequent ESWL x2 with retained fragments scheduled for stent exchange , presented with T 102.3 to ER ,nom on OPO SXT DS per ID and is feeling better, no dysuria, and decreased right flank tenderness, afebrile this AM,     PAST MEDICAL & SURGICAL HISTORY:  Cervical incompetence  Renal calculus  Hypothyroidism  Cholelithiasis  Normal Pregnancy  Asthma  Kidney stone on right side: s/p Stent Oct 2016  H/O lithotripsy x 2  S/P nasal septoplasty: in   History of Cholecystectomy: in   H/O:  Section x 1: in       REVIEW OF SYSTEMS:    CONSTITUTIONAL: No weakness, fevers or chills  EYES/ENT: No visual changes;  No vertigo or throat pain   NECK: No pain or stiffness  RESPIRATORY: No cough, wheezing, hemoptysis; No shortness of breath  CARDIOVASCULAR: No chest pain or palpitations  GASTROINTESTINAL: No abdominal or epigastric pain. No nausea, vomiting, or hematemesis; No diarrhea or constipation. No melena or hematochezia.  GENITOURINARY: No dysuria, frequency or hematuria, incontinence  NEUROLOGICAL: No numbness or weakness  SKIN: No itching, burning, rashes, or lesions   All other review of systems is negative unless indicated above.    MEDICATIONS  (STANDING):  sodium chloride 0.9%. 1000milliLiter(s) IV Continuous <Continuous>  tamsulosin 0.4milliGRAM(s) Oral at bedtime  levothyroxine 88MICROGram(s) Oral daily  trimethoprim  160 mG/ sulfamethoxazole 800 mG 1Tablet(s) Oral two times a day    MEDICATIONS  (PRN):  morphine  - Injectable 2milliGRAM(s) IV Push every 4 hours PRN Severe Pain (7 - 10)      Allergies    doxycycline (Hives)  Levaquin (Hives)  penicillin (Hives)    Intolerances        SOCIAL HISTORY:    FAMILY HISTORY:  Family history of type 2 diabetes mellitus  Family history of renal cancer  Family history of type 2 diabetes mellitus (Grandparent)      Vital Signs Last 24 Hrs  T(C): 36.6, Max: 37.3 (04-10 @ 19:06)  T(F): 97.9, Max: 99.2 (-10 @ 19:06)  HR: 93 (77 - 102)  BP: 92/56 (92/56 - 117/77)  BP(mean): --  RR: 17 (14 - 17)  SpO2: 98% (98% - 100%)    PHYSICAL EXAM:    Constitutional: NAD, well-developed  HEENT: FLOR, EOMI, Normal Hearing, MMM  Abd: BS+, soft, NT/ND, mild right  CVAT  Extremities: No peripheral edema  Neurological: A/O x 3, no focal deficits  Psychiatric: Normal mood, normal affect    LABS:                        9.9    9.3   )-----------( 334      ( 2017 07:51 )             32.3     04-11    142  |  111<H>  |  8   ----------------------------<  85  3.8   |  23  |  0.74    Ca    8.0<L>      2017 07:51    TPro  7.4  /  Alb  3.4  /  TBili  0.3  /  DBili  x   /  AST  12<L>  /  ALT  16  /  AlkPhos  65  04-10    PT/INR - ( 10 Apr 2017 20:07 )   PT: 14.0 sec;   INR: 1.28 ratio         PTT - ( 10 Apr 2017 20:07 )  PTT:27.9 sec  Urinalysis Basic - ( 10 Apr 2017 20:08 )    Color: x / Appearance: x / SG: x / pH: x  Gluc: x / Ketone: x  / Bili: x / Urobili: x   Blood: x / Protein: x / Nitrite: x   Leuk Esterase: x / RBC: 3-5 /HPF / WBC >50   Sq Epi: x / Non Sq Epi: Few / Bacteria: Many      Urine Culture:   Hemoglobin: 9.9 g/dL ( @ 07:51)  Hematocrit: 32.3 % ( @ 07:51)  Hemoglobin: 11.3 g/dL (04-10 @ 20:07)  Hematocrit: 35.8 % (04-10 @ 20:07)      RADIOLOGY & ADDITIONAL STUDIES:    CHIEF COMPLAINT:     HISTORY OF PRESENT ILLNESS:    PAST MEDICAL & SURGICAL HISTORY:  Cervical incompetence  Renal calculus  Hypothyroidism  Cholelithiasis  Normal Pregnancy  Asthma  Kidney stone on right side: Oct 2016  H/O lithotripsy  S/P nasal septoplasty: in   History of Cholecystectomy: in   H/O:  Section x 1: in       REVIEW OF SYSTEMS:    CONSTITUTIONAL: No weakness, fevers or chills  EYES/ENT: No visual changes;  No vertigo or throat pain   NECK: No pain or stiffness  RESPIRATORY: No cough, wheezing, hemoptysis; No shortness of breath  CARDIOVASCULAR: No chest pain or palpitations  GASTROINTESTINAL: No abdominal or epigastric pain. No nausea, vomiting, or hematemesis; No diarrhea or constipation. No melena or hematochezia.  GENITOURINARY: No dysuria, frequency or hematuria, incontinnece  NEUROLOGICAL: No numbness or weakness  SKIN: No itching, burning, rashes, or lesions   All other review of systems is negative unless indicated above.    MEDICATIONS  (STANDING):  sodium chloride 0.9%. 1000milliLiter(s) IV Continuous <Continuous>  tamsulosin 0.4milliGRAM(s) Oral at bedtime  levothyroxine 88MICROGram(s) Oral daily  trimethoprim  160 mG/sulfamethoxazole 800 mG 1Tablet(s) Oral two times a day    MEDICATIONS  (PRN):  morphine  - Injectable 2milliGRAM(s) IV Push every 4 hours PRN Severe Pain (7 - 10)      Allergies    doxycycline (Hives)  Levaquin (Hives)  penicillin (Hives)    Intolerances        SOCIAL HISTORY:    FAMILY HISTORY:  Family history of type 2 diabetes mellitus  Family history of renal cancer  Family history of type 2 diabetes mellitus (Grandparent)      Vital Signs Last 24 Hrs  T(C): 36.6, Max: 37.3 (04-10 @ 19:06)  T(F): 97.9, Max: 99.2 (04-10 @ 19:06)  HR: 93 (77 - 102)  BP: 92/56 (92/56 - 117/77)  BP(mean): --  RR: 17 (14 - 17)  SpO2: 98% (98% - 100%)    PHYSICAL EXAM:    Constitutional: NAD, well-developed  HEENT: FLOR, EOMI, Normal Hearing, MMM  Neck: No LAD, No JVD  Back: Normal spine flexure, No CVA tenderness  Respiratory: CTAB   Cardiovascular: S1 and S2, RRR, no M/G/R  Abd: BS+, soft, NT/ND, No CVAT  : Normal Vulva,/uretrha/introitus, meatus wnl., adenexa not palpable, Cervix moveable and non-tender, uterus non-tender meatus wnl.,   SRIKANTH: Normal sphincter tone,  no masses  Extremities: No peripheral edema  Vascular: 2+ peripheral pulses  Neurological: A/O x 3, no focal deficits  Psychiatric: Normal mood, normal affect  Musculoskeletal: 5/5 strength b/l upper and lower extremities  Skin: No rashes    LABS:                        9.9    9.3   )-----------( 334      ( 2017 07:51 )             32.3         142  |  111<H>  |  8   ----------------------------<  85  3.8   |  23  |  0.74    Ca    8.0<L>      2017 07:51    TPro  7.4  /  Alb  3.4  /  TBili  0.3  /  DBili  x   /  AST  12<L>  /  ALT  16  /  AlkPhos  65  04-10    PT/INR - ( 10 Apr 2017 20:07 )   PT: 14.0 sec;   INR: 1.28 ratio         PTT - ( 10 Apr 2017 20:07 )  PTT:27.9 sec  Urinalysis Basic - ( 10 Apr 2017 20:08 )    Color: x / Appearance: x / SG: x / pH: x  Gluc: x / Ketone: x  / Bili: x / Urobili: x   Blood: x / Protein: x / Nitrite: x   Leuk Esterase: x / RBC: 3-5 /HPF / WBC >50   Sq Epi: x / Non Sq Epi: Few / Bacteria: Many      Urine Culture:   Hemoglobin: 9.9 g/dL ( @ 07:51)  Hematocrit: 32.3 % ( @ 07:51)  Hemoglobin: 11.3 g/dL (04-10 @ 20:07)  Hematocrit: 35.8 % (04-10 @ 20:07)      RADIOLOGY & ADDITIONAL STUDIES:

## 2017-04-11 NOTE — DISCHARGE NOTE ADULT - MEDICATION SUMMARY - MEDICATIONS TO TAKE
I will START or STAY ON the medications listed below when I get home from the hospital:    tamsulosin 0.4 mg oral capsule  -- 1 cap(s) by mouth once a day (at bedtime)  -- Indication: For Renal stone    sulfamethoxazole-trimethoprim 800 mg-160 mg oral tablet  -- 1 tab(s) by mouth 2 times a day  -- Indication: For Pyelonephritis, acute    Synthroid 88 mcg (0.088 mg) oral tablet  -- 1 tab(s) by mouth once a day  -- Indication: For Pyelonephritis, acute

## 2017-04-11 NOTE — DISCHARGE NOTE ADULT - CARE PLAN
Principal Discharge DX:	Pyelonephritis, acute  Goal:	stable  Instructions for follow-up, activity and diet:	continue bactrim for 14 days post stent replacement   plan for urology to replace stent as scheduled  Secondary Diagnosis:	Kidney stone on right side  Instructions for follow-up, activity and diet:	continue bactrim for 14 days post stent replacement   plan for urology to replace stent as scheduled  Secondary Diagnosis:	Hypothyroidism  Instructions for follow-up, activity and diet:	continue home medications

## 2017-04-11 NOTE — CONSULT NOTE ADULT - PROBLEM SELECTOR RECOMMENDATION 9
IV antibiotics.  Consider ID consult, given pt's multiple allergies.  Dr. Peñaloza will see patient tomorrow.
story is most consistent with this diagnosis and with sensitivities Bactrim would be the most appropriate choice. Due to Bactrim's excellent oral bioavailability PO dosing should achieve IV levels and provide effective levels. Prior treatment of only 5 days may have been of inadequate duration. In the current context I recommend aminimum of 14 days of therapy and if stent can be changed or removed would continue treatment 2 weeks past this point.    --Bactrim DS 1-tab PO BID    Thank you for consulting us and involving us in the management of this most interesting and challenging case.     We will follow along in the care of this patient.

## 2017-04-11 NOTE — DISCHARGE NOTE ADULT - CARE PROVIDERS DIRECT ADDRESSES
[email or web address]

## 2017-04-11 NOTE — PROGRESS NOTE ADULT - ASSESSMENT
UTI responding to SXT DS, has stent and if stable and cleared by ID will replace stent 4/14 and continue abx

## 2017-04-11 NOTE — DISCHARGE NOTE ADULT - PATIENT PORTAL LINK FT
“You can access the [facility] Patient Portal, offered by [facility], by registering with the following website: [email or web address]”

## 2017-04-11 NOTE — CONSULT NOTE ADULT - PROBLEM SELECTOR RECOMMENDATION 2
Right stent in place; functioning.  For stent change by Dr. Peñaloza when UTI has been adequately treated.
as per urology, patient clinical stable but concerns that there may be a biofilm with this foreign body potentially interfering with curative therapy for urinary infections.

## 2017-04-13 ENCOUNTER — RESULT REVIEW (OUTPATIENT)
Age: 37
End: 2017-04-13

## 2017-04-13 DIAGNOSIS — Z96.0 PRESENCE OF UROGENITAL IMPLANTS: ICD-10-CM

## 2017-04-13 DIAGNOSIS — N10 ACUTE PYELONEPHRITIS: ICD-10-CM

## 2017-04-13 DIAGNOSIS — N39.0 URINARY TRACT INFECTION, SITE NOT SPECIFIED: ICD-10-CM

## 2017-04-13 DIAGNOSIS — J45.909 UNSPECIFIED ASTHMA, UNCOMPLICATED: ICD-10-CM

## 2017-04-13 DIAGNOSIS — N20.0 CALCULUS OF KIDNEY: ICD-10-CM

## 2017-04-13 DIAGNOSIS — E03.9 HYPOTHYROIDISM, UNSPECIFIED: ICD-10-CM

## 2017-04-13 RX ORDER — SODIUM CHLORIDE 9 MG/ML
1000 INJECTION, SOLUTION INTRAVENOUS
Qty: 0 | Refills: 0 | Status: DISCONTINUED | OUTPATIENT
Start: 2017-04-14 | End: 2017-04-14

## 2017-04-14 ENCOUNTER — TRANSCRIPTION ENCOUNTER (OUTPATIENT)
Age: 37
End: 2017-04-14

## 2017-04-14 ENCOUNTER — OUTPATIENT (OUTPATIENT)
Dept: OUTPATIENT SERVICES | Facility: HOSPITAL | Age: 37
LOS: 1 days | Discharge: HOME | End: 2017-04-14
Payer: COMMERCIAL

## 2017-04-14 VITALS
RESPIRATION RATE: 14 BRPM | OXYGEN SATURATION: 99 % | DIASTOLIC BLOOD PRESSURE: 72 MMHG | SYSTOLIC BLOOD PRESSURE: 110 MMHG | TEMPERATURE: 98 F | HEART RATE: 60 BPM

## 2017-04-14 VITALS
HEART RATE: 86 BPM | DIASTOLIC BLOOD PRESSURE: 74 MMHG | HEIGHT: 66.5 IN | TEMPERATURE: 99 F | OXYGEN SATURATION: 98 % | WEIGHT: 192.02 LBS | RESPIRATION RATE: 15 BRPM | SYSTOLIC BLOOD PRESSURE: 106 MMHG

## 2017-04-14 DIAGNOSIS — Z01.818 ENCOUNTER FOR OTHER PREPROCEDURAL EXAMINATION: ICD-10-CM

## 2017-04-14 DIAGNOSIS — Z98.89 OTHER SPECIFIED POSTPROCEDURAL STATES: Chronic | ICD-10-CM

## 2017-04-14 DIAGNOSIS — N20.0 CALCULUS OF KIDNEY: ICD-10-CM

## 2017-04-14 DIAGNOSIS — N20.0 CALCULUS OF KIDNEY: Chronic | ICD-10-CM

## 2017-04-14 PROCEDURE — 88300 SURGICAL PATH GROSS: CPT

## 2017-04-14 PROCEDURE — 76000 FLUOROSCOPY <1 HR PHYS/QHP: CPT

## 2017-04-14 PROCEDURE — C2617: CPT

## 2017-04-14 PROCEDURE — 52332 CYSTOSCOPY AND TREATMENT: CPT | Mod: RT

## 2017-04-14 PROCEDURE — 87186 SC STD MICRODIL/AGAR DIL: CPT

## 2017-04-14 PROCEDURE — 87086 URINE CULTURE/COLONY COUNT: CPT

## 2017-04-14 PROCEDURE — C1758: CPT

## 2017-04-14 PROCEDURE — 88300 SURGICAL PATH GROSS: CPT | Mod: 26

## 2017-04-14 PROCEDURE — C1769: CPT

## 2017-04-14 RX ORDER — HYDROMORPHONE HYDROCHLORIDE 2 MG/ML
0.5 INJECTION INTRAMUSCULAR; INTRAVENOUS; SUBCUTANEOUS
Qty: 0 | Refills: 0 | Status: DISCONTINUED | OUTPATIENT
Start: 2017-04-14 | End: 2017-04-14

## 2017-04-14 RX ORDER — AMIKACIN SULFATE 250 MG/ML
500 INJECTION, SOLUTION INTRAMUSCULAR; INTRAVENOUS ONCE
Qty: 0 | Refills: 0 | Status: COMPLETED | OUTPATIENT
Start: 2017-04-14 | End: 2017-04-14

## 2017-04-14 RX ORDER — LEVOTHYROXINE SODIUM 125 MCG
88 TABLET ORAL DAILY
Qty: 0 | Refills: 0 | Status: DISCONTINUED | OUTPATIENT
Start: 2017-04-14 | End: 2017-04-29

## 2017-04-14 RX ORDER — TAMSULOSIN HYDROCHLORIDE 0.4 MG/1
0.4 CAPSULE ORAL AT BEDTIME
Qty: 0 | Refills: 0 | Status: DISCONTINUED | OUTPATIENT
Start: 2017-04-14 | End: 2017-04-29

## 2017-04-14 RX ORDER — OXYCODONE HYDROCHLORIDE 5 MG/1
5 TABLET ORAL EVERY 4 HOURS
Qty: 0 | Refills: 0 | Status: DISCONTINUED | OUTPATIENT
Start: 2017-04-14 | End: 2017-04-14

## 2017-04-14 RX ORDER — ONDANSETRON 8 MG/1
4 TABLET, FILM COATED ORAL ONCE
Qty: 0 | Refills: 0 | Status: DISCONTINUED | OUTPATIENT
Start: 2017-04-14 | End: 2017-04-14

## 2017-04-14 RX ORDER — SODIUM CHLORIDE 9 MG/ML
1000 INJECTION, SOLUTION INTRAVENOUS
Qty: 0 | Refills: 0 | Status: DISCONTINUED | OUTPATIENT
Start: 2017-04-14 | End: 2017-04-14

## 2017-04-14 RX ADMIN — SODIUM CHLORIDE 75 MILLILITER(S): 9 INJECTION, SOLUTION INTRAVENOUS at 13:09

## 2017-04-14 RX ADMIN — SODIUM CHLORIDE 75 MILLILITER(S): 9 INJECTION, SOLUTION INTRAVENOUS at 15:38

## 2017-04-14 NOTE — BRIEF OPERATIVE NOTE - PROCEDURE
Cystoscopy  04/14/2017  right ureteral stent exchange 6 fr x 24 cm, rigth retrograde pyelogram  Active  TFELDERMAN

## 2017-04-14 NOTE — ASU DISCHARGE PLAN (ADULT/PEDIATRIC). - NOTIFY
Pain not relieved by Medications/Bleeding that does not stop/Fever greater than 101/Unable to Urinate

## 2017-04-14 NOTE — ASU DISCHARGE PLAN (ADULT/PEDIATRIC). - MEDICATION SUMMARY - MEDICATIONS TO TAKE
I will START or STAY ON the medications listed below when I get home from the hospital:    tamsulosin 0.4 mg oral capsule  -- 1 cap(s) by mouth once a day (at bedtime)  -- Indication: For decrease urgency and frquency    sulfamethoxazole-trimethoprim 800 mg-160 mg oral tablet  -- 1 tab(s) by mouth 2 times a day  -- Indication: For antibiotic    Synthroid 88 mcg (0.088 mg) oral tablet  -- 1 tab(s) by mouth once a day  -- Indication: For treat thyroid

## 2017-04-14 NOTE — ASU DISCHARGE PLAN (ADULT/PEDIATRIC). - ACTIVITY LEVEL
no sports/gym/no exercise/no intercourse/weight bearing as tolerated/nothing per vagina/no heavy lifting

## 2017-04-14 NOTE — BRIEF OPERATIVE NOTE - POST-OP DX
Ureteral stent retained  04/14/2017    Active  Thomas Peñaloza  Ureteral stent retained  04/14/2017  right renal calculi  Active  Thomas Peñaloza

## 2017-04-16 LAB
-  AMIKACIN: SIGNIFICANT CHANGE UP
-  AMPICILLIN/SULBACTAM: SIGNIFICANT CHANGE UP
-  AMPICILLIN: SIGNIFICANT CHANGE UP
-  AZTREONAM: SIGNIFICANT CHANGE UP
-  CEFAZOLIN: SIGNIFICANT CHANGE UP
-  CEFEPIME: SIGNIFICANT CHANGE UP
-  CEFOXITIN: SIGNIFICANT CHANGE UP
-  CEFTAZIDIME: SIGNIFICANT CHANGE UP
-  CEFTRIAXONE: SIGNIFICANT CHANGE UP
-  CIPROFLOXACIN: SIGNIFICANT CHANGE UP
-  ERTAPENEM: SIGNIFICANT CHANGE UP
-  GENTAMICIN: SIGNIFICANT CHANGE UP
-  IMIPENEM: SIGNIFICANT CHANGE UP
-  LEVOFLOXACIN: SIGNIFICANT CHANGE UP
-  MEROPENEM: SIGNIFICANT CHANGE UP
-  NITROFURANTOIN: SIGNIFICANT CHANGE UP
-  PIPERACILLIN/TAZOBACTAM: SIGNIFICANT CHANGE UP
-  TOBRAMYCIN: SIGNIFICANT CHANGE UP
-  TRIMETHOPRIM/SULFAMETHOXAZOLE: SIGNIFICANT CHANGE UP
CULTURE RESULTS: SIGNIFICANT CHANGE UP
METHOD TYPE: SIGNIFICANT CHANGE UP
ORGANISM # SPEC MICROSCOPIC CNT: SIGNIFICANT CHANGE UP
ORGANISM # SPEC MICROSCOPIC CNT: SIGNIFICANT CHANGE UP
SPECIMEN SOURCE: SIGNIFICANT CHANGE UP

## 2017-04-17 ENCOUNTER — RESULT REVIEW (OUTPATIENT)
Age: 37
End: 2017-04-17

## 2017-04-18 DIAGNOSIS — J45.909 UNSPECIFIED ASTHMA, UNCOMPLICATED: ICD-10-CM

## 2017-04-18 DIAGNOSIS — N20.0 CALCULUS OF KIDNEY: ICD-10-CM

## 2017-04-18 DIAGNOSIS — Z88.0 ALLERGY STATUS TO PENICILLIN: ICD-10-CM

## 2017-04-18 DIAGNOSIS — Z88.3 ALLERGY STATUS TO OTHER ANTI-INFECTIVE AGENTS: ICD-10-CM

## 2017-04-18 DIAGNOSIS — E03.9 HYPOTHYROIDISM, UNSPECIFIED: ICD-10-CM

## 2017-04-18 LAB — SURGICAL PATHOLOGY FINAL REPORT - CH: SIGNIFICANT CHANGE UP

## 2017-09-24 ENCOUNTER — EMERGENCY (EMERGENCY)
Facility: HOSPITAL | Age: 37
LOS: 1 days | Discharge: HOME | End: 2017-09-24
Attending: EMERGENCY MEDICINE | Admitting: EMERGENCY MEDICINE
Payer: COMMERCIAL

## 2017-09-24 VITALS
SYSTOLIC BLOOD PRESSURE: 140 MMHG | WEIGHT: 195.11 LBS | TEMPERATURE: 98 F | RESPIRATION RATE: 16 BRPM | OXYGEN SATURATION: 98 % | DIASTOLIC BLOOD PRESSURE: 90 MMHG | HEART RATE: 96 BPM

## 2017-09-24 VITALS
SYSTOLIC BLOOD PRESSURE: 133 MMHG | OXYGEN SATURATION: 99 % | DIASTOLIC BLOOD PRESSURE: 88 MMHG | RESPIRATION RATE: 16 BRPM | HEART RATE: 87 BPM | TEMPERATURE: 99 F

## 2017-09-24 DIAGNOSIS — N20.0 CALCULUS OF KIDNEY: Chronic | ICD-10-CM

## 2017-09-24 DIAGNOSIS — Z98.89 OTHER SPECIFIED POSTPROCEDURAL STATES: Chronic | ICD-10-CM

## 2017-09-24 LAB
ANION GAP SERPL CALC-SCNC: 8 MMOL/L — SIGNIFICANT CHANGE UP (ref 5–17)
BUN SERPL-MCNC: 12 MG/DL — SIGNIFICANT CHANGE UP (ref 7–23)
CALCIUM SERPL-MCNC: 8.3 MG/DL — LOW (ref 8.5–10.1)
CHLORIDE SERPL-SCNC: 109 MMOL/L — HIGH (ref 96–108)
CO2 SERPL-SCNC: 24 MMOL/L — SIGNIFICANT CHANGE UP (ref 22–31)
CREAT SERPL-MCNC: 0.73 MG/DL — SIGNIFICANT CHANGE UP (ref 0.5–1.3)
D DIMER BLD IA.RAPID-MCNC: <150 NG/ML DDU — SIGNIFICANT CHANGE UP
GLUCOSE SERPL-MCNC: 104 MG/DL — HIGH (ref 70–99)
HCG SERPL-ACNC: <1 MIU/ML — SIGNIFICANT CHANGE UP
HCT VFR BLD CALC: 38.7 % — SIGNIFICANT CHANGE UP (ref 34.5–45)
HGB BLD-MCNC: 12.4 G/DL — SIGNIFICANT CHANGE UP (ref 11.5–15.5)
MCHC RBC-ENTMCNC: 27.3 PG — SIGNIFICANT CHANGE UP (ref 27–34)
MCHC RBC-ENTMCNC: 32.1 GM/DL — SIGNIFICANT CHANGE UP (ref 32–36)
MCV RBC AUTO: 85 FL — SIGNIFICANT CHANGE UP (ref 80–100)
PLATELET # BLD AUTO: 488 K/UL — HIGH (ref 150–400)
POTASSIUM SERPL-MCNC: 3.9 MMOL/L — SIGNIFICANT CHANGE UP (ref 3.5–5.3)
POTASSIUM SERPL-SCNC: 3.9 MMOL/L — SIGNIFICANT CHANGE UP (ref 3.5–5.3)
RBC # BLD: 4.55 M/UL — SIGNIFICANT CHANGE UP (ref 3.8–5.2)
RBC # FLD: 15.2 % — HIGH (ref 10.3–14.5)
SODIUM SERPL-SCNC: 141 MMOL/L — SIGNIFICANT CHANGE UP (ref 135–145)
TROPONIN I SERPL-MCNC: <.015 NG/ML — SIGNIFICANT CHANGE UP (ref 0.01–0.04)
WBC # BLD: 9.5 K/UL — SIGNIFICANT CHANGE UP (ref 3.8–10.5)
WBC # FLD AUTO: 9.5 K/UL — SIGNIFICANT CHANGE UP (ref 3.8–10.5)

## 2017-09-24 PROCEDURE — 99285 EMERGENCY DEPT VISIT HI MDM: CPT

## 2017-09-24 PROCEDURE — 71046 X-RAY EXAM CHEST 2 VIEWS: CPT

## 2017-09-24 PROCEDURE — 71020: CPT | Mod: 26

## 2017-09-24 PROCEDURE — 85027 COMPLETE CBC AUTOMATED: CPT

## 2017-09-24 PROCEDURE — 80048 BASIC METABOLIC PNL TOTAL CA: CPT

## 2017-09-24 PROCEDURE — 84702 CHORIONIC GONADOTROPIN TEST: CPT

## 2017-09-24 PROCEDURE — 84484 ASSAY OF TROPONIN QUANT: CPT

## 2017-09-24 PROCEDURE — 36415 COLL VENOUS BLD VENIPUNCTURE: CPT

## 2017-09-24 PROCEDURE — 85379 FIBRIN DEGRADATION QUANT: CPT

## 2017-09-24 PROCEDURE — 99284 EMERGENCY DEPT VISIT MOD MDM: CPT | Mod: 25

## 2017-09-24 NOTE — ED ADULT NURSE NOTE - OBJECTIVE STATEMENT
pt reporting left sided Chest pressure that radiates to the left arm with SOB and diaphoresis today at 1400. pt awoke this am with palpitations. pt has no medical hx, pt took 6 hr flight from [place] 1 month ago and has a stressful job. pt speaks in full sentences, equal chest rise and fall.    pt placed on cardiac monitor, ekg completed. iv established

## 2017-09-24 NOTE — ED PROVIDER NOTE - CONSTITUTIONAL, MLM
normal... Well appearing, dark complexioned female, well nourished, awake, alert, oriented to person, place, time/situation and in no apparent distress.

## 2017-09-24 NOTE — ED PROVIDER NOTE - OBJECTIVE STATEMENT
38 yo female with no pertinent PMHx for present complaint presents with palpitations since early this morning followed by bilateral, anterior, non radiating chest pressure, constant x 3-4 hours with minimal SOB. No fever or chills. No cough. Trip to [place] one month ago. Still has symptoms at this time.

## 2017-09-24 NOTE — ED PROVIDER NOTE - PROGRESS NOTE DETAILS
Feels well at this time and wants to go home. States will follow-up with Dr. Kirkland to finish workup with him that he started.

## 2017-09-28 DIAGNOSIS — Z87.442 PERSONAL HISTORY OF URINARY CALCULI: ICD-10-CM

## 2017-09-28 DIAGNOSIS — R07.89 OTHER CHEST PAIN: ICD-10-CM

## 2017-09-28 DIAGNOSIS — Z88.0 ALLERGY STATUS TO PENICILLIN: ICD-10-CM

## 2017-09-28 DIAGNOSIS — J45.909 UNSPECIFIED ASTHMA, UNCOMPLICATED: ICD-10-CM

## 2017-09-28 DIAGNOSIS — E03.9 HYPOTHYROIDISM, UNSPECIFIED: ICD-10-CM

## 2017-09-28 DIAGNOSIS — Z88.1 ALLERGY STATUS TO OTHER ANTIBIOTIC AGENTS STATUS: ICD-10-CM

## 2017-12-23 ENCOUNTER — EMERGENCY (EMERGENCY)
Facility: HOSPITAL | Age: 37
LOS: 0 days | Discharge: HOME | End: 2017-12-24
Attending: EMERGENCY MEDICINE | Admitting: EMERGENCY MEDICINE
Payer: COMMERCIAL

## 2017-12-23 VITALS — WEIGHT: 139.99 LBS | HEIGHT: 66 IN

## 2017-12-23 DIAGNOSIS — N20.0 CALCULUS OF KIDNEY: Chronic | ICD-10-CM

## 2017-12-23 DIAGNOSIS — G43.109 MIGRAINE WITH AURA, NOT INTRACTABLE, WITHOUT STATUS MIGRAINOSUS: ICD-10-CM

## 2017-12-23 DIAGNOSIS — R51 HEADACHE: ICD-10-CM

## 2017-12-23 DIAGNOSIS — Z98.89 OTHER SPECIFIED POSTPROCEDURAL STATES: Chronic | ICD-10-CM

## 2017-12-23 DIAGNOSIS — R11.10 VOMITING, UNSPECIFIED: ICD-10-CM

## 2017-12-23 DIAGNOSIS — H53.149 VISUAL DISCOMFORT, UNSPECIFIED: ICD-10-CM

## 2017-12-23 LAB
ALBUMIN SERPL ELPH-MCNC: 4.2 G/DL — SIGNIFICANT CHANGE UP (ref 3.3–5)
ALP SERPL-CCNC: 97 U/L — SIGNIFICANT CHANGE UP (ref 40–120)
ALT FLD-CCNC: 17 U/L — SIGNIFICANT CHANGE UP (ref 12–78)
ANION GAP SERPL CALC-SCNC: 11 MMOL/L — SIGNIFICANT CHANGE UP (ref 5–17)
AST SERPL-CCNC: 16 U/L — SIGNIFICANT CHANGE UP (ref 15–37)
BASOPHILS # BLD AUTO: 0.1 K/UL — SIGNIFICANT CHANGE UP (ref 0–0.2)
BASOPHILS NFR BLD AUTO: 1.1 % — SIGNIFICANT CHANGE UP (ref 0–2)
BILIRUB SERPL-MCNC: 0.5 MG/DL — SIGNIFICANT CHANGE UP (ref 0.2–1.2)
BUN SERPL-MCNC: 13 MG/DL — SIGNIFICANT CHANGE UP (ref 7–23)
CALCIUM SERPL-MCNC: 9.1 MG/DL — SIGNIFICANT CHANGE UP (ref 8.5–10.1)
CHLORIDE SERPL-SCNC: 106 MMOL/L — SIGNIFICANT CHANGE UP (ref 96–108)
CO2 SERPL-SCNC: 21 MMOL/L — LOW (ref 22–31)
CREAT SERPL-MCNC: 0.81 MG/DL — SIGNIFICANT CHANGE UP (ref 0.5–1.3)
EOSINOPHIL # BLD AUTO: 0.2 K/UL — SIGNIFICANT CHANGE UP (ref 0–0.5)
EOSINOPHIL NFR BLD AUTO: 1.3 % — SIGNIFICANT CHANGE UP (ref 0–6)
GLUCOSE SERPL-MCNC: 96 MG/DL — SIGNIFICANT CHANGE UP (ref 70–99)
HCG SERPL-ACNC: <1 MIU/ML — SIGNIFICANT CHANGE UP
HCT VFR BLD CALC: 43.8 % — SIGNIFICANT CHANGE UP (ref 34.5–45)
HGB BLD-MCNC: 13.3 G/DL — SIGNIFICANT CHANGE UP (ref 11.5–15.5)
LYMPHOCYTES # BLD AUTO: 1.3 K/UL — SIGNIFICANT CHANGE UP (ref 1–3.3)
LYMPHOCYTES # BLD AUTO: 10.4 % — LOW (ref 13–44)
MANUAL DIF COMMENT BLD-IMP: SIGNIFICANT CHANGE UP
MCHC RBC-ENTMCNC: 25.7 PG — LOW (ref 27–34)
MCHC RBC-ENTMCNC: 30.3 GM/DL — LOW (ref 32–36)
MCV RBC AUTO: 84.8 FL — SIGNIFICANT CHANGE UP (ref 80–100)
MONOCYTES # BLD AUTO: 0.4 K/UL — SIGNIFICANT CHANGE UP (ref 0–0.9)
MONOCYTES NFR BLD AUTO: 3.6 % — SIGNIFICANT CHANGE UP (ref 2–14)
NEUTROPHILS # BLD AUTO: 10.3 K/UL — HIGH (ref 1.8–7.4)
NEUTROPHILS NFR BLD AUTO: 83.6 % — HIGH (ref 43–77)
OVALOCYTES BLD QL SMEAR: SLIGHT — SIGNIFICANT CHANGE UP
PLAT MORPH BLD: NORMAL — SIGNIFICANT CHANGE UP
PLATELET # BLD AUTO: 445 K/UL — HIGH (ref 150–400)
POIKILOCYTOSIS BLD QL AUTO: SLIGHT — SIGNIFICANT CHANGE UP
POTASSIUM SERPL-MCNC: 3.8 MMOL/L — SIGNIFICANT CHANGE UP (ref 3.5–5.3)
POTASSIUM SERPL-SCNC: 3.8 MMOL/L — SIGNIFICANT CHANGE UP (ref 3.5–5.3)
PROT SERPL-MCNC: 9 GM/DL — HIGH (ref 6–8.3)
RBC # BLD: 5.17 M/UL — SIGNIFICANT CHANGE UP (ref 3.8–5.2)
RBC # FLD: 15.5 % — HIGH (ref 10.3–14.5)
RBC BLD AUTO: (no result)
SODIUM SERPL-SCNC: 138 MMOL/L — SIGNIFICANT CHANGE UP (ref 135–145)
SPHEROCYTES BLD QL SMEAR: SLIGHT — SIGNIFICANT CHANGE UP
TARGETS BLD QL SMEAR: SLIGHT — SIGNIFICANT CHANGE UP
WBC # BLD: 12.3 K/UL — HIGH (ref 3.8–10.5)
WBC # FLD AUTO: 12.3 K/UL — HIGH (ref 3.8–10.5)

## 2017-12-23 PROCEDURE — 99285 EMERGENCY DEPT VISIT HI MDM: CPT

## 2017-12-23 RX ORDER — SUMATRIPTAN SUCCINATE 4 MG/.5ML
6 INJECTION, SOLUTION SUBCUTANEOUS ONCE
Qty: 0 | Refills: 0 | Status: COMPLETED | OUTPATIENT
Start: 2017-12-23 | End: 2017-12-23

## 2017-12-23 RX ORDER — DEXAMETHASONE 0.5 MG/5ML
10 ELIXIR ORAL ONCE
Qty: 0 | Refills: 0 | Status: COMPLETED | OUTPATIENT
Start: 2017-12-23 | End: 2017-12-23

## 2017-12-23 RX ORDER — DIPHENHYDRAMINE HCL 50 MG
25 CAPSULE ORAL ONCE
Qty: 0 | Refills: 0 | Status: COMPLETED | OUTPATIENT
Start: 2017-12-23 | End: 2017-12-23

## 2017-12-23 RX ORDER — KETOROLAC TROMETHAMINE 30 MG/ML
30 SYRINGE (ML) INJECTION ONCE
Qty: 0 | Refills: 0 | Status: DISCONTINUED | OUTPATIENT
Start: 2017-12-23 | End: 2017-12-23

## 2017-12-23 RX ORDER — ACETAMINOPHEN 500 MG
1000 TABLET ORAL ONCE
Qty: 0 | Refills: 0 | Status: COMPLETED | OUTPATIENT
Start: 2017-12-23 | End: 2017-12-23

## 2017-12-23 RX ORDER — METOCLOPRAMIDE HCL 10 MG
10 TABLET ORAL ONCE
Qty: 0 | Refills: 0 | Status: COMPLETED | OUTPATIENT
Start: 2017-12-23 | End: 2017-12-23

## 2017-12-23 RX ADMIN — Medication 10 MILLIGRAM(S): at 22:58

## 2017-12-23 RX ADMIN — Medication 10 MILLIGRAM(S): at 23:33

## 2017-12-23 RX ADMIN — Medication 400 MILLIGRAM(S): at 23:33

## 2017-12-23 RX ADMIN — Medication 25 MILLIGRAM(S): at 22:58

## 2017-12-23 NOTE — ED PROVIDER NOTE - OBJECTIVE STATEMENT
36 y/o F PMHx Migraines, presents to the ED c/o headache. The pt provides that she has been having throbbing frontal headache associated with vomiting. +photophobia, no h/o fever, chills, dizziness, abd pain, n/d/c, cp, cough, sob, rash or urinary incontinence.

## 2017-12-23 NOTE — ED PROVIDER NOTE - CONSTITUTIONAL, MLM
normal... +Photophobia, +HA, well nourished, awake, alert, oriented to person, place, time/situation and in no apparent distress.

## 2017-12-23 NOTE — ED ADULT NURSE NOTE - OBJECTIVE STATEMENT
Pt presents to the ED with complaints of a migraine and vomiting. Pt states she has been unable to tolerate PO intake today since 11Am. Pt states she has vomited approx 5-6 times including once upon arrival to the ED. Pt states she attempted to take Tylenol PTA but vomited afterwards. Pt states the migrane has been worsening throughout the day and is worsened with light.

## 2017-12-23 NOTE — ED PROVIDER NOTE - CHPI ED SYMPTOMS NEG
no numbness/no fever/no weakness/no change in level of consciousness/no dizziness/no confusion/no loss of consciousness/no blurred vision

## 2017-12-23 NOTE — ED PROVIDER NOTE - PROGRESS NOTE DETAILS
JG:  Received signout from Dr. Salinas if patient is feeling better after meds, she can be discharged.  Pt. able to tolerate PO and feeling better at this time.  Will f/u with her outpatient providers.

## 2017-12-23 NOTE — ED PROVIDER NOTE - NEUROLOGICAL, MLM
Alert and oriented, no focal deficits, no motor or sensory deficits. Alert and oriented, no focal deficits, no motor or sensory deficits.  No meningismus. Neck supple.

## 2017-12-24 VITALS
SYSTOLIC BLOOD PRESSURE: 119 MMHG | HEART RATE: 61 BPM | OXYGEN SATURATION: 99 % | TEMPERATURE: 98 F | RESPIRATION RATE: 16 BRPM | DIASTOLIC BLOOD PRESSURE: 79 MMHG

## 2017-12-24 RX ORDER — OXYCODONE HYDROCHLORIDE 5 MG/1
5 TABLET ORAL ONCE
Qty: 0 | Refills: 0 | Status: DISCONTINUED | OUTPATIENT
Start: 2017-12-24 | End: 2017-12-24

## 2017-12-24 RX ADMIN — Medication 30 MILLIGRAM(S): at 00:04

## 2017-12-24 RX ADMIN — SUMATRIPTAN SUCCINATE 6 MILLIGRAM(S): 4 INJECTION, SOLUTION SUBCUTANEOUS at 00:04

## 2017-12-24 RX ADMIN — OXYCODONE HYDROCHLORIDE 5 MILLIGRAM(S): 5 TABLET ORAL at 00:28

## 2018-03-02 ENCOUNTER — APPOINTMENT (OUTPATIENT)
Dept: UROLOGY | Facility: CLINIC | Age: 38
End: 2018-03-02

## 2018-03-22 NOTE — ASU PATIENT PROFILE, ADULT - TEACHING/LEARNING EDUCATIONAL LEVEL
"              After Visit Summary   3/22/2018    Don Samson    MRN: [id number]           Patient Information     Date Of Birth          1953        Visit Information        Provider Department      3/22/2018 4:00 PM Flakita Pastor APRN CNP [facility]        Today's Diagnoses     Hypertension goal BP (blood pressure) < 140/90    -  1      Care Instructions    1.  Blood pressure is normal today, lab recheck today, consider taking it in the evening.  Follow up if fatigue is not improving.  We should see you once a year or sooner if needed    2.  See Hernando Guillen in April as scheduled          Follow-ups after your visit        Your next 10 appointments already scheduled     Apr 06, 2018 10:30 AM CDT   New Visit with Hernando Guillen [facility] ([facility])    [address] [place] [id number] [phone]              Who to contact     If you have questions or need follow up information about today's clinic visit or your schedule please contact [facility] directly at [phone].  Normal or non-critical lab and imaging results will be communicated to you by MyChart, letter or phone within 4 business days after the clinic has received the results. If you do not hear from us within 7 days, please contact the clinic through [employer] or phone. If you have a critical or abnormal lab result, we will notify you by phone as soon as possible.  Submit refill requests through [employer] or call your pharmacy and they will forward the refill request to us. Please allow 3 business days for your refill to be completed.          Additional Information About Your Visit        MyChart Information     [employer] lets you send messages to your doctor, view your test results, renew your prescriptions, schedule appointments and more. To sign up, go to [email or web address] . Click on \"Log in\" on the left side of the screen, which will " "take you to the Welcome page. Then click on \"Sign up Now\" on the right side of the page.     You will be asked to enter the access code listed below, as well as some personal information. Please follow the directions to create your username and password.     Your access code is: [email or web address]  Expires: 2018 11:30 AM     Your access code will  in 90 days. If you need help or a new code, please call your [place] clinic or [phone].        Care EveryWhere ID     This is your Care EveryWhere ID. This could be used by other organizations to access your [place] medical records  [id number]        Your Vitals Were     Pulse Temperature Respirations Pulse Oximetry BMI (Body Mass Index)       68 97.7  F (36.5  C) (Oral) 12 99% 32.28 kg/m2        Blood Pressure from Last 3 Encounters:   18 121/72   03/15/18 154/84   18 (!) 152/94    Weight from Last 3 Encounters:   18 238 lb (108 kg)   03/15/18 235 lb (106.6 kg)   18 244 lb (110.7 kg)              We Performed the Following     Basic metabolic panel          Where to get your medicines      These medications were sent to [facility] [address] [place] [address] AT [address] [address]     Phone:  [phone]     lisinopril 40 MG tablet          Primary Care Provider Office Phone # Fax #    William Blanco [phone]       [address]  [facility] [address]        Equal Access to Services     [facility] : Hadii tom Garcia, jayda opal, qaybta ruddy reinoso . So [facility] [phone].    ATENCIÓN: Si habla español, tiene a torres disposición servicios gratuitos de asistencia lingüística. Llame al [phone].    We comply with applicable federal civil rights laws and [place] laws. We do not discriminate on the basis of race, color, national origin, age, disability, sex, sexual " orientation, or gender identity.            Thank you!     Thank you for choosing [facility]  for your care. Our goal is always to provide you with excellent care. Hearing back from our patients is one way we can continue to improve our services. Please take a few minutes to complete the written survey that you may receive in the mail after your visit with us. Thank you!             Your Updated Medication List - Protect others around you: Learn how to safely use, store and throw away your medicines at www.disposemymeds.org.          This list is accurate as of 3/22/18  4:15 PM.  Always use your most recent med list.                   Brand Name Dispense Instructions for use Diagnosis    cyclobenzaprine 10 MG tablet    FLEXERIL    14 tablet    Take 1 tablet (10 mg) by mouth nightly as needed for muscle spasms    Back muscle spasm       lisinopril 40 MG tablet    PRINIVIL/ZESTRIL    90 tablet    Take 1 tablet (40 mg) by mouth daily    Hypertension goal BP (blood pressure) < 140/90          graduate school

## 2018-03-29 ENCOUNTER — APPOINTMENT (OUTPATIENT)
Dept: HUMAN REPRODUCTION | Facility: CLINIC | Age: 38
End: 2018-03-29

## 2018-05-16 ENCOUNTER — TRANSCRIPTION ENCOUNTER (OUTPATIENT)
Age: 38
End: 2018-05-16

## 2018-05-17 ENCOUNTER — OUTPATIENT (OUTPATIENT)
Dept: OUTPATIENT SERVICES | Facility: HOSPITAL | Age: 38
LOS: 1 days | End: 2018-05-17
Payer: COMMERCIAL

## 2018-05-17 ENCOUNTER — APPOINTMENT (OUTPATIENT)
Dept: CT IMAGING | Facility: CLINIC | Age: 38
End: 2018-05-17
Payer: COMMERCIAL

## 2018-05-17 ENCOUNTER — TRANSCRIPTION ENCOUNTER (OUTPATIENT)
Age: 38
End: 2018-05-17

## 2018-05-17 ENCOUNTER — EMERGENCY (EMERGENCY)
Facility: HOSPITAL | Age: 38
LOS: 1 days | Discharge: HOME | End: 2018-05-17
Attending: EMERGENCY MEDICINE
Payer: COMMERCIAL

## 2018-05-17 VITALS
HEART RATE: 87 BPM | TEMPERATURE: 98 F | HEIGHT: 67 IN | RESPIRATION RATE: 18 BRPM | SYSTOLIC BLOOD PRESSURE: 150 MMHG | OXYGEN SATURATION: 100 % | WEIGHT: 184.97 LBS | DIASTOLIC BLOOD PRESSURE: 95 MMHG

## 2018-05-17 DIAGNOSIS — Z98.89 OTHER SPECIFIED POSTPROCEDURAL STATES: Chronic | ICD-10-CM

## 2018-05-17 DIAGNOSIS — Z00.8 ENCOUNTER FOR OTHER GENERAL EXAMINATION: ICD-10-CM

## 2018-05-17 DIAGNOSIS — N20.0 CALCULUS OF KIDNEY: Chronic | ICD-10-CM

## 2018-05-17 PROCEDURE — 74177 CT ABD & PELVIS W/CONTRAST: CPT | Mod: 26

## 2018-05-17 PROCEDURE — 99284 EMERGENCY DEPT VISIT MOD MDM: CPT

## 2018-05-17 PROCEDURE — 74177 CT ABD & PELVIS W/CONTRAST: CPT

## 2018-05-17 NOTE — ED ADULT TRIAGE NOTE - CHIEF COMPLAINT QUOTE
abn labs, sent in by PMD for epigastric pain had outpatient Ct scan which showed "free fluid around R kidney"  hx of stent placement April 2017, back pain  denies CP

## 2018-05-18 VITALS
DIASTOLIC BLOOD PRESSURE: 78 MMHG | RESPIRATION RATE: 18 BRPM | TEMPERATURE: 98 F | OXYGEN SATURATION: 99 % | SYSTOLIC BLOOD PRESSURE: 132 MMHG | HEART RATE: 70 BPM

## 2018-05-18 LAB
ALBUMIN SERPL ELPH-MCNC: 4 G/DL — SIGNIFICANT CHANGE UP (ref 3.3–5)
ALP SERPL-CCNC: 73 U/L — SIGNIFICANT CHANGE UP (ref 40–120)
ALT FLD-CCNC: 12 U/L — SIGNIFICANT CHANGE UP (ref 10–45)
ANION GAP SERPL CALC-SCNC: 13 MMOL/L — SIGNIFICANT CHANGE UP (ref 5–17)
APPEARANCE UR: ABNORMAL
AST SERPL-CCNC: 18 U/L — SIGNIFICANT CHANGE UP (ref 10–40)
BASOPHILS # BLD AUTO: 0 K/UL — SIGNIFICANT CHANGE UP (ref 0–0.2)
BASOPHILS NFR BLD AUTO: 0.5 % — SIGNIFICANT CHANGE UP (ref 0–2)
BILIRUB SERPL-MCNC: 0.1 MG/DL — LOW (ref 0.2–1.2)
BILIRUB UR-MCNC: NEGATIVE — SIGNIFICANT CHANGE UP
BUN SERPL-MCNC: 13 MG/DL — SIGNIFICANT CHANGE UP (ref 7–23)
CALCIUM SERPL-MCNC: 8.6 MG/DL — SIGNIFICANT CHANGE UP (ref 8.4–10.5)
CHLORIDE SERPL-SCNC: 104 MMOL/L — SIGNIFICANT CHANGE UP (ref 96–108)
CO2 SERPL-SCNC: 21 MMOL/L — LOW (ref 22–31)
COLOR SPEC: YELLOW — SIGNIFICANT CHANGE UP
CREAT SERPL-MCNC: 0.73 MG/DL — SIGNIFICANT CHANGE UP (ref 0.5–1.3)
DIFF PNL FLD: ABNORMAL
EOSINOPHIL # BLD AUTO: 0.2 K/UL — SIGNIFICANT CHANGE UP (ref 0–0.5)
EOSINOPHIL NFR BLD AUTO: 2.2 % — SIGNIFICANT CHANGE UP (ref 0–6)
GAS PNL BLDV: SIGNIFICANT CHANGE UP
GLUCOSE SERPL-MCNC: 91 MG/DL — SIGNIFICANT CHANGE UP (ref 70–99)
GLUCOSE UR QL: NEGATIVE — SIGNIFICANT CHANGE UP
HCT VFR BLD CALC: 36.8 % — SIGNIFICANT CHANGE UP (ref 34.5–45)
HGB BLD-MCNC: 11.8 G/DL — SIGNIFICANT CHANGE UP (ref 11.5–15.5)
KETONES UR-MCNC: NEGATIVE — SIGNIFICANT CHANGE UP
LEUKOCYTE ESTERASE UR-ACNC: ABNORMAL
LIDOCAIN IGE QN: 22 U/L — SIGNIFICANT CHANGE UP (ref 7–60)
LYMPHOCYTES # BLD AUTO: 2 K/UL — SIGNIFICANT CHANGE UP (ref 1–3.3)
LYMPHOCYTES # BLD AUTO: 20 % — SIGNIFICANT CHANGE UP (ref 13–44)
MCHC RBC-ENTMCNC: 26.3 PG — LOW (ref 27–34)
MCHC RBC-ENTMCNC: 31.9 GM/DL — LOW (ref 32–36)
MCV RBC AUTO: 82.5 FL — SIGNIFICANT CHANGE UP (ref 80–100)
MONOCYTES # BLD AUTO: 0.6 K/UL — SIGNIFICANT CHANGE UP (ref 0–0.9)
MONOCYTES NFR BLD AUTO: 6.4 % — SIGNIFICANT CHANGE UP (ref 2–14)
NEUTROPHILS # BLD AUTO: 6.9 K/UL — SIGNIFICANT CHANGE UP (ref 1.8–7.4)
NEUTROPHILS NFR BLD AUTO: 70.9 % — SIGNIFICANT CHANGE UP (ref 43–77)
NITRITE UR-MCNC: NEGATIVE — SIGNIFICANT CHANGE UP
PH UR: 6 — SIGNIFICANT CHANGE UP (ref 5–8)
PLATELET # BLD AUTO: 442 K/UL — HIGH (ref 150–400)
POTASSIUM SERPL-MCNC: 4 MMOL/L — SIGNIFICANT CHANGE UP (ref 3.5–5.3)
POTASSIUM SERPL-SCNC: 4 MMOL/L — SIGNIFICANT CHANGE UP (ref 3.5–5.3)
PROT SERPL-MCNC: 7.9 G/DL — SIGNIFICANT CHANGE UP (ref 6–8.3)
PROT UR-MCNC: 30 MG/DL
RBC # BLD: 4.47 M/UL — SIGNIFICANT CHANGE UP (ref 3.8–5.2)
RBC # FLD: 14.3 % — SIGNIFICANT CHANGE UP (ref 10.3–14.5)
SODIUM SERPL-SCNC: 138 MMOL/L — SIGNIFICANT CHANGE UP (ref 135–145)
SP GR SPEC: 1.02 — SIGNIFICANT CHANGE UP (ref 1.01–1.02)
UROBILINOGEN FLD QL: NEGATIVE — SIGNIFICANT CHANGE UP
WBC # BLD: 9.7 K/UL — SIGNIFICANT CHANGE UP (ref 3.8–10.5)
WBC # FLD AUTO: 9.7 K/UL — SIGNIFICANT CHANGE UP (ref 3.8–10.5)

## 2018-05-18 PROCEDURE — 84132 ASSAY OF SERUM POTASSIUM: CPT

## 2018-05-18 PROCEDURE — 99284 EMERGENCY DEPT VISIT MOD MDM: CPT | Mod: 25

## 2018-05-18 PROCEDURE — 85027 COMPLETE CBC AUTOMATED: CPT

## 2018-05-18 PROCEDURE — 81001 URINALYSIS AUTO W/SCOPE: CPT

## 2018-05-18 PROCEDURE — 82947 ASSAY GLUCOSE BLOOD QUANT: CPT

## 2018-05-18 PROCEDURE — 83605 ASSAY OF LACTIC ACID: CPT

## 2018-05-18 PROCEDURE — 82803 BLOOD GASES ANY COMBINATION: CPT

## 2018-05-18 PROCEDURE — 82435 ASSAY OF BLOOD CHLORIDE: CPT

## 2018-05-18 PROCEDURE — 85014 HEMATOCRIT: CPT

## 2018-05-18 PROCEDURE — 82330 ASSAY OF CALCIUM: CPT

## 2018-05-18 PROCEDURE — 80053 COMPREHEN METABOLIC PANEL: CPT

## 2018-05-18 PROCEDURE — 87086 URINE CULTURE/COLONY COUNT: CPT

## 2018-05-18 PROCEDURE — 96375 TX/PRO/DX INJ NEW DRUG ADDON: CPT

## 2018-05-18 PROCEDURE — 84295 ASSAY OF SERUM SODIUM: CPT

## 2018-05-18 PROCEDURE — 96374 THER/PROPH/DIAG INJ IV PUSH: CPT

## 2018-05-18 PROCEDURE — 83690 ASSAY OF LIPASE: CPT

## 2018-05-18 RX ORDER — FAMOTIDINE 10 MG/ML
20 INJECTION INTRAVENOUS ONCE
Qty: 0 | Refills: 0 | Status: COMPLETED | OUTPATIENT
Start: 2018-05-18 | End: 2018-05-18

## 2018-05-18 RX ORDER — CEPHALEXIN 500 MG
1 CAPSULE ORAL
Qty: 30 | Refills: 0 | OUTPATIENT
Start: 2018-05-18 | End: 2018-05-27

## 2018-05-18 RX ORDER — CEFTRIAXONE 500 MG/1
1 INJECTION, POWDER, FOR SOLUTION INTRAMUSCULAR; INTRAVENOUS ONCE
Qty: 0 | Refills: 0 | Status: COMPLETED | OUTPATIENT
Start: 2018-05-18 | End: 2018-05-18

## 2018-05-18 RX ORDER — ONDANSETRON 8 MG/1
4 TABLET, FILM COATED ORAL ONCE
Qty: 0 | Refills: 0 | Status: COMPLETED | OUTPATIENT
Start: 2018-05-18 | End: 2018-05-18

## 2018-05-18 RX ADMIN — ONDANSETRON 4 MILLIGRAM(S): 8 TABLET, FILM COATED ORAL at 01:01

## 2018-05-18 RX ADMIN — FAMOTIDINE 20 MILLIGRAM(S): 10 INJECTION INTRAVENOUS at 01:01

## 2018-05-18 RX ADMIN — Medication 30 MILLILITER(S): at 01:01

## 2018-05-18 RX ADMIN — CEFTRIAXONE 100 GRAM(S): 500 INJECTION, POWDER, FOR SOLUTION INTRAMUSCULAR; INTRAVENOUS at 02:20

## 2018-05-18 NOTE — CONSULT NOTE ADULT - ASSESSMENT
37 year old female with right renal colic    -analgesia as needed with PO medications  -treat with empiric antibiotics  -f/u urine culture  -follow up with Dr Gian Cool for definitive stone and stent management [phone]  -d/w Dr Cool

## 2018-05-18 NOTE — ED PROVIDER NOTE - FAMILY HISTORY
Family history of renal cancer     Family history of type 2 diabetes mellitus     Grandparent  Still living? No  Family history of type 2 diabetes mellitus, Age at diagnosis: Age Unknown

## 2018-05-18 NOTE — ED PROVIDER NOTE - PHYSICAL EXAMINATION
Tatum Mayes M.D.:   patient awake alert NAD .   LUNGS CTAB no wheeze no crackle.   CARD RRR no m/r/g.    Abdomen soft NT ND no rebound no guarding +R. CVAT.   EXT WWP no edema no calf tenderness CV 2+DP/PT bilaterally.   neuro A&Ox3 gait normal.    skin warm and dry no rash  HEENT: moist mucous membranes, PERRL, EOMI

## 2018-05-18 NOTE — ED ADULT NURSE NOTE - OBJECTIVE STATEMENT
36 y/o female with PMH renal calculi with stent placement in 2017, Cholecystectomy ,  , lithotripsy, arrives to ED after going to urgent care for abdominal pain. Pain is epigastric tender to touch, sharp and intermittent. Abdomen is soft, ND, no signs of trauma or bruising. Patient states at urgent care CT scan showed free flowing fluid in right kidney. Patient reports that her red blood cell count and platelet count was low. States blood was found in urine after UA was done at urgent care. Patient reports feeling "tired" for several days.  No changes in menses, LMP . Patient is a/ox3 denies shortness of breath, chest pain, palpitations, abdominal pain, vomiting or diarrhea. Patient endorses nausea and chills ,denies fevers. VSS, afebrile.

## 2018-05-18 NOTE — ED PROVIDER NOTE - ATTENDING CONTRIBUTION TO CARE
Attending MD Rosas: I personally have seen and examined this patient.  Resident note reviewed and agree on plan of care and except where noted.  See below for details.     37F with PMH including nephrolithiasis s/p recent ureteral stent at [place] presents to the ED with two days of epigastric pain.  Reports woke up with pain on Wedensday morning.  reports the pain is sharp and intermittent, unchanged with po intake.  Reports some nausea.  Denies vomiting, diarrhea. Reports that she went to urgent care because was told that she had lab derangement, had CT which showed worsening R sided hydro.  Reports that she was supposed to have stent removed but did not.  Reports she was "discharged" from Dr. Peñaloza's practice and was scheduled to see Dr. Hoenig but did not keep appointment and did not reschedule.  Denies fevers, +chills.  Denies chest pain, shortness of breath, palpitations. Denies dysuria, hematuria, change in urinary habits including frequency, urgency. PMH nephrolithiasis, R ureteral stent in April 2017, PSH cholecystectomy, C sec x 2, Meds: denies, allergies: PCN, doxy, levaquin (hives), denies etoh tobacco drugs.  On exam, NAD, head NCAT, PERRL, FROM at neck, no tenderness to palpation or stepoffs along length of spine, lungs CTAB with good inspiratory effort, +S1S2, no m/r/g, abdomen soft with +BS, NT, ND, R CVAT, moving all extremities with 5/5 strength bilateral upper and lower extremities, good and equal  strength bilaterally; A/P: 37F with year old ureteral stent on R with R CVAT, will obtain UA, labs, uro consult, reasssess

## 2018-05-18 NOTE — ED PROVIDER NOTE - PROGRESS NOTE DETAILS
Attending MD Rosas: Patient re-evaluated and lab tests reviewed with patient.  Importance of seeking follow up with Dr. Gian Matias discussed.  Explained that after discussion with urology will likely schedule a procedure to remove stent and also have definitive treatment for stones at the same time.  Patient stable for discharge. Follow up instructions given, importance of follow up emphasized, return to ED parameters reviewed and patient verbalized understanding.  All questions answered, all concerns addressed.

## 2018-05-18 NOTE — ED PROVIDER NOTE - OBJECTIVE STATEMENT
Tatum Mayes M.D: 37F hx kidney stones s/p recent ureteral stent at [facility] p/w 2 days epigastric pain radiating around like band. went to urgent care today had ct showing worsening right sided hydro and pt was sent in for eval. notes nausea but no vomiting. denies urinary symptoms. no f/c.

## 2018-05-18 NOTE — ED PROVIDER NOTE - MEDICAL DECISION MAKING DETAILS
37F w/ worsening right hydro, r cvat on exam. concern for pyelo vs stone. for labs urine pain and nausea control, uro consult reassess

## 2018-05-18 NOTE — CONSULT NOTE ADULT - SUBJECTIVE AND OBJECTIVE BOX
Urology PA Consult Note    HPI:  This is a 37y old Female who presents with epigastric and right renal colic for the past day.  States the pain started initially int he epigastric region but has now worsened at the right flank.  Admits to nausea, but denies vomiting, fevers, chills, changes in urinary habits, CP, SOB.  Patient has had 3x lithotripsies and stent exchanges from 10/2016 to 2017, which was when her last stent was exchanged.  She was lost to follow up over the past year.  Developed the pain yesterday and presented ot an urgent care center, where a CT scan was performed.  She was then directed to come to the ER after being found to have hydronephrosis despite the presence of the right ureteral stent.  Currently pain controlled.    PAST MEDICAL & SURGICAL HISTORY:  Cervical incompetence  Renal calculus  Hypothyroidism  Cholelithiasis  Asthma  Kidney stone on right side: Oct 2016  H/O lithotripsy  S/P nasal septoplasty: in   History of Cholecystectomy: in   H/O:  Section x 1: in       FAMILY HISTORY:  Family history of type 2 diabetes mellitus  Family history of renal cancer  Family history of type 2 diabetes mellitus (Grandparent)      SOCIAL HISTORY:   noncontributory        Allergies    doxycycline (Hives)  Levaquin (Hives)  penicillin (Hives)          REVIEW OF SYSTEMS: Pertinent positives and negatives as stated in HPI, otherwise negative    Vital signs  T(C): 36.7 (18 @ 00:20), Max: 36.8 (18 @ 23:16)  HR: 77 (18 @ 00:20)  BP: 146/93 (18 @ 00:20)  SpO2: 100% (18 @ 00:20)  Wt(kg): --    I&O's Summary      Physical Exam  Gen: NAD, A+Ox3  Abd: Soft, NT/ND  Back: +R CVAT  Ext: No edema present b/l    LABS:                            11.8   9.7   )-----------( 442      ( 18 May 2018 01:59 )             36.8         138  |  104  |  13  ----------------------------<  91  4.0   |  21<L>  |  0.73    Ca    8.6      18 May 2018 01:59    TPro  7.9  /  Alb  4.0  /  TBili  0.1<L>  /  DBili  x   /  AST  18  /  ALT  12  /  AlkPhos  73  05-18      Urinalysis Basic - ( 18 May 2018 00:58 )    Color: x / Appearance: x / SG: x / pH: x  Gluc: x / Ketone: x  / Bili: x / Urobili: x   Blood: x / Protein: x / Nitrite: x   Leuk Esterase: x / RBC: >50 /HPF / WBC >50 /HPF   Sq Epi: x / Non Sq Epi: Occasional /HPF / Bacteria: Moderate /HPF        Urine culture: pending      Imaging:    CT: Interval development of mild right hydronephrosis since 4/10/2017 despite   the presence of a right ureteral stent. No right ureteral calculi. Multiple   nonobstructing bilateral intrarenal calculi.

## 2018-05-19 LAB
CULTURE RESULTS: SIGNIFICANT CHANGE UP
SPECIMEN SOURCE: SIGNIFICANT CHANGE UP

## 2018-05-29 ENCOUNTER — RESULT REVIEW (OUTPATIENT)
Age: 38
End: 2018-05-29

## 2018-07-22 NOTE — CONSULT NOTE ADULT - CONSULT REQUESTED DATE/TIME
10-Apr-2017
89 y/o F PMHx ovarian cancer w/ recurrent ascites, colon ca s/p colostomy with ostomy bag, s/p incarcerated ventral hernia repair 10/2017, DM2, HTN, afib s/p PPM, hypothyroid, obesity, and overactive bladder presents with generalized weakness, rigors, leukocytosis, and >3500 nucleated cells in peritoneal fluid.
11-Apr-2017 09:28

## 2018-11-19 ENCOUNTER — EMERGENCY (EMERGENCY)
Facility: HOSPITAL | Age: 38
LOS: 0 days | Discharge: HOME | End: 2018-11-19
Attending: EMERGENCY MEDICINE | Admitting: EMERGENCY MEDICINE
Payer: COMMERCIAL

## 2018-11-19 VITALS
RESPIRATION RATE: 17 BRPM | SYSTOLIC BLOOD PRESSURE: 145 MMHG | OXYGEN SATURATION: 100 % | DIASTOLIC BLOOD PRESSURE: 87 MMHG | TEMPERATURE: 98 F | HEART RATE: 78 BPM

## 2018-11-19 VITALS — WEIGHT: 179.9 LBS | HEIGHT: 66 IN

## 2018-11-19 DIAGNOSIS — Z79.899 OTHER LONG TERM (CURRENT) DRUG THERAPY: ICD-10-CM

## 2018-11-19 DIAGNOSIS — R10.9 UNSPECIFIED ABDOMINAL PAIN: ICD-10-CM

## 2018-11-19 DIAGNOSIS — N39.0 URINARY TRACT INFECTION, SITE NOT SPECIFIED: ICD-10-CM

## 2018-11-19 DIAGNOSIS — Z98.89 OTHER SPECIFIED POSTPROCEDURAL STATES: Chronic | ICD-10-CM

## 2018-11-19 DIAGNOSIS — N20.0 CALCULUS OF KIDNEY: Chronic | ICD-10-CM

## 2018-11-19 DIAGNOSIS — R05 COUGH: ICD-10-CM

## 2018-11-19 DIAGNOSIS — E03.9 HYPOTHYROIDISM, UNSPECIFIED: ICD-10-CM

## 2018-11-19 DIAGNOSIS — J45.909 UNSPECIFIED ASTHMA, UNCOMPLICATED: ICD-10-CM

## 2018-11-19 DIAGNOSIS — Z87.442 PERSONAL HISTORY OF URINARY CALCULI: ICD-10-CM

## 2018-11-19 DIAGNOSIS — N88.3 INCOMPETENCE OF CERVIX UTERI: ICD-10-CM

## 2018-11-19 DIAGNOSIS — Z90.49 ACQUIRED ABSENCE OF OTHER SPECIFIED PARTS OF DIGESTIVE TRACT: ICD-10-CM

## 2018-11-19 LAB
ALBUMIN SERPL ELPH-MCNC: 3.7 G/DL — SIGNIFICANT CHANGE UP (ref 3.3–5)
ALP SERPL-CCNC: 86 U/L — SIGNIFICANT CHANGE UP (ref 40–120)
ALT FLD-CCNC: 25 U/L — SIGNIFICANT CHANGE UP (ref 12–78)
ANION GAP SERPL CALC-SCNC: 7 MMOL/L — SIGNIFICANT CHANGE UP (ref 5–17)
APPEARANCE UR: CLEAR — SIGNIFICANT CHANGE UP
AST SERPL-CCNC: 13 U/L — LOW (ref 15–37)
BACTERIA # UR AUTO: ABNORMAL
BASOPHILS # BLD AUTO: 0.07 K/UL — SIGNIFICANT CHANGE UP (ref 0–0.2)
BASOPHILS NFR BLD AUTO: 0.6 % — SIGNIFICANT CHANGE UP (ref 0–2)
BILIRUB SERPL-MCNC: 0.4 MG/DL — SIGNIFICANT CHANGE UP (ref 0.2–1.2)
BILIRUB UR-MCNC: NEGATIVE — SIGNIFICANT CHANGE UP
BUN SERPL-MCNC: 14 MG/DL — SIGNIFICANT CHANGE UP (ref 7–23)
CALCIUM SERPL-MCNC: 8.6 MG/DL — SIGNIFICANT CHANGE UP (ref 8.5–10.1)
CHLORIDE SERPL-SCNC: 107 MMOL/L — SIGNIFICANT CHANGE UP (ref 96–108)
CO2 SERPL-SCNC: 26 MMOL/L — SIGNIFICANT CHANGE UP (ref 22–31)
COLOR SPEC: YELLOW — SIGNIFICANT CHANGE UP
CREAT SERPL-MCNC: 0.88 MG/DL — SIGNIFICANT CHANGE UP (ref 0.5–1.3)
D DIMER BLD IA.RAPID-MCNC: <150 NG/ML DDU — SIGNIFICANT CHANGE UP
DIFF PNL FLD: ABNORMAL
EOSINOPHIL # BLD AUTO: 0.19 K/UL — SIGNIFICANT CHANGE UP (ref 0–0.5)
EOSINOPHIL NFR BLD AUTO: 1.7 % — SIGNIFICANT CHANGE UP (ref 0–6)
EPI CELLS # UR: NEGATIVE — SIGNIFICANT CHANGE UP
GLUCOSE SERPL-MCNC: 91 MG/DL — SIGNIFICANT CHANGE UP (ref 70–99)
GLUCOSE UR QL: NEGATIVE MG/DL — SIGNIFICANT CHANGE UP
HCT VFR BLD CALC: 36 % — SIGNIFICANT CHANGE UP (ref 34.5–45)
HGB BLD-MCNC: 10.9 G/DL — LOW (ref 11.5–15.5)
IMM GRANULOCYTES NFR BLD AUTO: 0.5 % — SIGNIFICANT CHANGE UP (ref 0–1.5)
KETONES UR-MCNC: NEGATIVE — SIGNIFICANT CHANGE UP
LEUKOCYTE ESTERASE UR-ACNC: ABNORMAL
LIDOCAIN IGE QN: 85 U/L — SIGNIFICANT CHANGE UP (ref 73–393)
LYMPHOCYTES # BLD AUTO: 18.9 % — SIGNIFICANT CHANGE UP (ref 13–44)
LYMPHOCYTES # BLD AUTO: 2.07 K/UL — SIGNIFICANT CHANGE UP (ref 1–3.3)
MCHC RBC-ENTMCNC: 24.1 PG — LOW (ref 27–34)
MCHC RBC-ENTMCNC: 30.3 GM/DL — LOW (ref 32–36)
MCV RBC AUTO: 79.6 FL — LOW (ref 80–100)
MONOCYTES # BLD AUTO: 0.69 K/UL — SIGNIFICANT CHANGE UP (ref 0–0.9)
MONOCYTES NFR BLD AUTO: 6.3 % — SIGNIFICANT CHANGE UP (ref 2–14)
NEUTROPHILS # BLD AUTO: 7.87 K/UL — HIGH (ref 1.8–7.4)
NEUTROPHILS NFR BLD AUTO: 72 % — SIGNIFICANT CHANGE UP (ref 43–77)
NITRITE UR-MCNC: NEGATIVE — SIGNIFICANT CHANGE UP
NRBC # BLD: 0 /100 WBCS — SIGNIFICANT CHANGE UP (ref 0–0)
PH UR: 6 — SIGNIFICANT CHANGE UP (ref 5–8)
PLATELET # BLD AUTO: 475 K/UL — HIGH (ref 150–400)
POTASSIUM SERPL-MCNC: 3.9 MMOL/L — SIGNIFICANT CHANGE UP (ref 3.5–5.3)
POTASSIUM SERPL-SCNC: 3.9 MMOL/L — SIGNIFICANT CHANGE UP (ref 3.5–5.3)
PROT SERPL-MCNC: 8.1 GM/DL — SIGNIFICANT CHANGE UP (ref 6–8.3)
PROT UR-MCNC: 30 MG/DL
RBC # BLD: 4.52 M/UL — SIGNIFICANT CHANGE UP (ref 3.8–5.2)
RBC # FLD: 19 % — HIGH (ref 10.3–14.5)
RBC CASTS # UR COMP ASSIST: ABNORMAL /HPF (ref 0–4)
SODIUM SERPL-SCNC: 140 MMOL/L — SIGNIFICANT CHANGE UP (ref 135–145)
SP GR SPEC: 1.01 — SIGNIFICANT CHANGE UP (ref 1.01–1.02)
UROBILINOGEN FLD QL: NEGATIVE MG/DL — SIGNIFICANT CHANGE UP
WBC # BLD: 10.94 K/UL — HIGH (ref 3.8–10.5)
WBC # FLD AUTO: 10.94 K/UL — HIGH (ref 3.8–10.5)
WBC UR QL: ABNORMAL

## 2018-11-19 PROCEDURE — 71046 X-RAY EXAM CHEST 2 VIEWS: CPT | Mod: 26

## 2018-11-19 PROCEDURE — 74176 CT ABD & PELVIS W/O CONTRAST: CPT | Mod: 26

## 2018-11-19 PROCEDURE — 99284 EMERGENCY DEPT VISIT MOD MDM: CPT

## 2018-11-19 RX ORDER — HYDROMORPHONE HYDROCHLORIDE 2 MG/ML
1 INJECTION INTRAMUSCULAR; INTRAVENOUS; SUBCUTANEOUS ONCE
Qty: 0 | Refills: 0 | Status: DISCONTINUED | OUTPATIENT
Start: 2018-11-19 | End: 2018-11-19

## 2018-11-19 RX ORDER — MORPHINE SULFATE 50 MG/1
4 CAPSULE, EXTENDED RELEASE ORAL ONCE
Qty: 0 | Refills: 0 | Status: DISCONTINUED | OUTPATIENT
Start: 2018-11-19 | End: 2018-11-19

## 2018-11-19 RX ORDER — CEFTRIAXONE 500 MG/1
1000 INJECTION, POWDER, FOR SOLUTION INTRAMUSCULAR; INTRAVENOUS ONCE
Qty: 0 | Refills: 0 | Status: COMPLETED | OUTPATIENT
Start: 2018-11-19 | End: 2018-11-19

## 2018-11-19 RX ORDER — ONDANSETRON 8 MG/1
4 TABLET, FILM COATED ORAL ONCE
Qty: 0 | Refills: 0 | Status: COMPLETED | OUTPATIENT
Start: 2018-11-19 | End: 2018-11-19

## 2018-11-19 RX ORDER — CEFDINIR 250 MG/5ML
1 POWDER, FOR SUSPENSION ORAL
Qty: 14 | Refills: 0 | OUTPATIENT
Start: 2018-11-19 | End: 2018-11-25

## 2018-11-19 RX ORDER — SODIUM CHLORIDE 9 MG/ML
1000 INJECTION INTRAMUSCULAR; INTRAVENOUS; SUBCUTANEOUS ONCE
Qty: 0 | Refills: 0 | Status: COMPLETED | OUTPATIENT
Start: 2018-11-19 | End: 2018-11-19

## 2018-11-19 RX ORDER — METOCLOPRAMIDE HCL 10 MG
10 TABLET ORAL ONCE
Qty: 0 | Refills: 0 | Status: COMPLETED | OUTPATIENT
Start: 2018-11-19 | End: 2018-11-19

## 2018-11-19 RX ORDER — ONDANSETRON 8 MG/1
1 TABLET, FILM COATED ORAL
Qty: 12 | Refills: 0 | OUTPATIENT
Start: 2018-11-19

## 2018-11-19 RX ADMIN — SODIUM CHLORIDE 2000 MILLILITER(S): 9 INJECTION INTRAMUSCULAR; INTRAVENOUS; SUBCUTANEOUS at 14:57

## 2018-11-19 RX ADMIN — ONDANSETRON 4 MILLIGRAM(S): 8 TABLET, FILM COATED ORAL at 17:49

## 2018-11-19 RX ADMIN — MORPHINE SULFATE 4 MILLIGRAM(S): 50 CAPSULE, EXTENDED RELEASE ORAL at 14:57

## 2018-11-19 RX ADMIN — MORPHINE SULFATE 4 MILLIGRAM(S): 50 CAPSULE, EXTENDED RELEASE ORAL at 15:49

## 2018-11-19 RX ADMIN — Medication 10 MILLIGRAM(S): at 19:55

## 2018-11-19 RX ADMIN — SODIUM CHLORIDE 1000 MILLILITER(S): 9 INJECTION INTRAMUSCULAR; INTRAVENOUS; SUBCUTANEOUS at 15:25

## 2018-11-19 RX ADMIN — HYDROMORPHONE HYDROCHLORIDE 1 MILLIGRAM(S): 2 INJECTION INTRAMUSCULAR; INTRAVENOUS; SUBCUTANEOUS at 16:14

## 2018-11-19 RX ADMIN — HYDROMORPHONE HYDROCHLORIDE 1 MILLIGRAM(S): 2 INJECTION INTRAMUSCULAR; INTRAVENOUS; SUBCUTANEOUS at 16:29

## 2018-11-19 RX ADMIN — MORPHINE SULFATE 4 MILLIGRAM(S): 50 CAPSULE, EXTENDED RELEASE ORAL at 15:31

## 2018-11-19 RX ADMIN — CEFTRIAXONE 1000 MILLIGRAM(S): 500 INJECTION, POWDER, FOR SOLUTION INTRAMUSCULAR; INTRAVENOUS at 19:33

## 2018-11-19 RX ADMIN — ONDANSETRON 4 MILLIGRAM(S): 8 TABLET, FILM COATED ORAL at 14:57

## 2018-11-19 NOTE — ED ADULT NURSE NOTE - NSIMPLEMENTINTERV_GEN_ALL_ED
Implemented All Universal Safety Interventions:  [place] to call system. Call bell, personal items and telephone within reach. Instruct patient to call for assistance. Room bathroom lighting operational. Non-slip footwear when patient is off stretcher. Physically safe environment: no spills, clutter or unnecessary equipment. Stretcher in lowest position, wheels locked, appropriate side rails in place.

## 2018-11-19 NOTE — ED STATDOCS - PROGRESS NOTE DETAILS
Patient seen and evaluated with ED attending at intake, ED attending note and orders reviewed, will continue with patient follow up and care -Kate Osborn PA-C All results reviewed with patient who is now pain controlled but nauseous from pain medication.  +UTI and intrarenal stents.  Possibly recently passed stone but not visualized on CT.  Stent in place.  Reviwed all of this with Dr. Zamora who is on call for urology and he recommends that if her pain is controlled she can be d/c home to follow up with Dr. Saeed Salinas in office as he usually deals with more complicated cases of stents being in too long.  Discussed plan with patient as well as reviewed strict return precautions for new or worsening symptoms.  Patient verbalized understanding.  Will treat with cefdinir and percocet for pain control -Kate Osborn PA-C

## 2018-11-19 NOTE — ED ADULT NURSE REASSESSMENT NOTE - NS ED NURSE REASSESS COMMENT FT1
pt medicated for pain and moved onto stretcher in room for comfort. ESTEVAN Love notified. Will continue to monitor pt.

## 2018-11-19 NOTE — ED STATDOCS - ATTENDING CONTRIBUTION TO CARE
I, Maximino Hinojosa MD,  performed the initial face to face bedside interview with this patient regarding history of present illness, review of symptoms and relevant past medical, social and family history.  I completed an independent physical examination.  I was the initial provider who evaluated this patient. I have signed out the follow up of any pending tests (i.e. labs, radiological studies) to the ACP.  I have communicated the patient’s plan of care and disposition with the ACP.  The history, relevant review of systems, past medical and surgical history, medical decision making, and physical examination was documented by the scribe in my presence and I attest to the accuracy of the documentation.

## 2018-11-19 NOTE — ED STATDOCS - OBJECTIVE STATEMENT
37 y/o female with a PMHx of asthma, kidney stones s/p right renal stent presents to the ED c/o right flank pain x5 days. Pain is worse with walking, movement, deep breathing. This morning pt's pain worsened and became stabbing. Pain sometimes radiates to abd. +non-productive cough. No recent trauma. 2 weeks ago pt on Zithromax due to URI symptoms. No current uro. Non smoker. No EtOH use. No illicit drug use. No recent travel. No PMD.

## 2018-11-19 NOTE — ED ADULT TRIAGE NOTE - CHIEF COMPLAINT QUOTE
Pt presents to the ED with complaints of flank pain and blood in urine, rule out kidney stone, pt states she has had a stent placed in the past for a kidney stone. Pt states she has had the pain since thursday.

## 2018-12-26 ENCOUNTER — APPOINTMENT (OUTPATIENT)
Dept: UROLOGY | Facility: CLINIC | Age: 38
End: 2018-12-26

## 2019-01-08 ENCOUNTER — EMERGENCY (EMERGENCY)
Facility: HOSPITAL | Age: 39
LOS: 1 days | Discharge: HOME | End: 2019-01-08
Attending: EMERGENCY MEDICINE
Payer: COMMERCIAL

## 2019-01-08 VITALS
HEIGHT: 66.5 IN | SYSTOLIC BLOOD PRESSURE: 128 MMHG | OXYGEN SATURATION: 99 % | WEIGHT: 199.08 LBS | HEART RATE: 86 BPM | DIASTOLIC BLOOD PRESSURE: 87 MMHG | RESPIRATION RATE: 19 BRPM | TEMPERATURE: 98 F

## 2019-01-08 DIAGNOSIS — Z98.89 OTHER SPECIFIED POSTPROCEDURAL STATES: Chronic | ICD-10-CM

## 2019-01-08 DIAGNOSIS — N20.0 CALCULUS OF KIDNEY: Chronic | ICD-10-CM

## 2019-01-08 LAB
ALBUMIN SERPL ELPH-MCNC: 4.5 G/DL — SIGNIFICANT CHANGE UP (ref 3.3–5)
ALP SERPL-CCNC: 69 U/L — SIGNIFICANT CHANGE UP (ref 40–120)
ALT FLD-CCNC: 12 U/L — SIGNIFICANT CHANGE UP (ref 10–45)
ANION GAP SERPL CALC-SCNC: 14 MMOL/L — SIGNIFICANT CHANGE UP (ref 5–17)
APPEARANCE UR: CLEAR — SIGNIFICANT CHANGE UP
AST SERPL-CCNC: 13 U/L — SIGNIFICANT CHANGE UP (ref 10–40)
BACTERIA # UR AUTO: ABNORMAL
BASOPHILS # BLD AUTO: 0.1 K/UL — SIGNIFICANT CHANGE UP (ref 0–0.2)
BASOPHILS NFR BLD AUTO: 0.6 % — SIGNIFICANT CHANGE UP (ref 0–2)
BILIRUB SERPL-MCNC: 0.7 MG/DL — SIGNIFICANT CHANGE UP (ref 0.2–1.2)
BILIRUB UR-MCNC: NEGATIVE — SIGNIFICANT CHANGE UP
BUN SERPL-MCNC: 14 MG/DL — SIGNIFICANT CHANGE UP (ref 7–23)
CALCIUM SERPL-MCNC: 9.3 MG/DL — SIGNIFICANT CHANGE UP (ref 8.4–10.5)
CHLORIDE SERPL-SCNC: 103 MMOL/L — SIGNIFICANT CHANGE UP (ref 96–108)
CO2 SERPL-SCNC: 21 MMOL/L — LOW (ref 22–31)
COLOR SPEC: SIGNIFICANT CHANGE UP
CREAT SERPL-MCNC: 0.78 MG/DL — SIGNIFICANT CHANGE UP (ref 0.5–1.3)
DIFF PNL FLD: ABNORMAL
EOSINOPHIL # BLD AUTO: 0.1 K/UL — SIGNIFICANT CHANGE UP (ref 0–0.5)
EOSINOPHIL NFR BLD AUTO: 1 % — SIGNIFICANT CHANGE UP (ref 0–6)
EPI CELLS # UR: 2 /HPF — SIGNIFICANT CHANGE UP
GLUCOSE SERPL-MCNC: 91 MG/DL — SIGNIFICANT CHANGE UP (ref 70–99)
GLUCOSE UR QL: NEGATIVE — SIGNIFICANT CHANGE UP
HCG UR QL: NEGATIVE — SIGNIFICANT CHANGE UP
HCT VFR BLD CALC: 34.3 % — LOW (ref 34.5–45)
HGB BLD-MCNC: 11.1 G/DL — LOW (ref 11.5–15.5)
HYALINE CASTS # UR AUTO: 3 /LPF — HIGH (ref 0–2)
KETONES UR-MCNC: ABNORMAL
LEUKOCYTE ESTERASE UR-ACNC: ABNORMAL
LIDOCAIN IGE QN: 19 U/L — SIGNIFICANT CHANGE UP (ref 7–60)
LYMPHOCYTES # BLD AUTO: 1.7 K/UL — SIGNIFICANT CHANGE UP (ref 1–3.3)
LYMPHOCYTES # BLD AUTO: 18 % — SIGNIFICANT CHANGE UP (ref 13–44)
MCHC RBC-ENTMCNC: 25.1 PG — LOW (ref 27–34)
MCHC RBC-ENTMCNC: 32.2 GM/DL — SIGNIFICANT CHANGE UP (ref 32–36)
MCV RBC AUTO: 77.8 FL — LOW (ref 80–100)
MONOCYTES # BLD AUTO: 1 K/UL — HIGH (ref 0–0.9)
MONOCYTES NFR BLD AUTO: 10.6 % — SIGNIFICANT CHANGE UP (ref 2–14)
NEUTROPHILS # BLD AUTO: 6.6 K/UL — SIGNIFICANT CHANGE UP (ref 1.8–7.4)
NEUTROPHILS NFR BLD AUTO: 69.8 % — SIGNIFICANT CHANGE UP (ref 43–77)
NITRITE UR-MCNC: NEGATIVE — SIGNIFICANT CHANGE UP
PH UR: 6 — SIGNIFICANT CHANGE UP (ref 5–8)
PLATELET # BLD AUTO: 494 K/UL — HIGH (ref 150–400)
POTASSIUM SERPL-MCNC: 4.2 MMOL/L — SIGNIFICANT CHANGE UP (ref 3.5–5.3)
POTASSIUM SERPL-SCNC: 4.2 MMOL/L — SIGNIFICANT CHANGE UP (ref 3.5–5.3)
PROT SERPL-MCNC: 7.6 G/DL — SIGNIFICANT CHANGE UP (ref 6–8.3)
PROT UR-MCNC: ABNORMAL
RBC # BLD: 4.41 M/UL — SIGNIFICANT CHANGE UP (ref 3.8–5.2)
RBC # FLD: 17 % — HIGH (ref 10.3–14.5)
RBC CASTS # UR COMP ASSIST: 11 /HPF — HIGH (ref 0–4)
SODIUM SERPL-SCNC: 138 MMOL/L — SIGNIFICANT CHANGE UP (ref 135–145)
SP GR SPEC: 1.02 — SIGNIFICANT CHANGE UP (ref 1.01–1.02)
UROBILINOGEN FLD QL: NEGATIVE — SIGNIFICANT CHANGE UP
WBC # BLD: 9.5 K/UL — SIGNIFICANT CHANGE UP (ref 3.8–10.5)
WBC # FLD AUTO: 9.5 K/UL — SIGNIFICANT CHANGE UP (ref 3.8–10.5)
WBC UR QL: 24 /HPF — HIGH (ref 0–5)

## 2019-01-08 PROCEDURE — 74176 CT ABD & PELVIS W/O CONTRAST: CPT | Mod: 26

## 2019-01-08 PROCEDURE — 99218: CPT

## 2019-01-08 RX ORDER — ONDANSETRON 8 MG/1
4 TABLET, FILM COATED ORAL ONCE
Qty: 0 | Refills: 0 | Status: COMPLETED | OUTPATIENT
Start: 2019-01-08 | End: 2019-01-08

## 2019-01-08 RX ORDER — SODIUM CHLORIDE 9 MG/ML
1000 INJECTION INTRAMUSCULAR; INTRAVENOUS; SUBCUTANEOUS ONCE
Qty: 0 | Refills: 0 | Status: COMPLETED | OUTPATIENT
Start: 2019-01-08 | End: 2019-01-08

## 2019-01-08 RX ORDER — HYDROMORPHONE HYDROCHLORIDE 2 MG/ML
1 INJECTION INTRAMUSCULAR; INTRAVENOUS; SUBCUTANEOUS ONCE
Qty: 0 | Refills: 0 | Status: DISCONTINUED | OUTPATIENT
Start: 2019-01-08 | End: 2019-01-08

## 2019-01-08 RX ORDER — METOCLOPRAMIDE HCL 10 MG
10 TABLET ORAL ONCE
Qty: 0 | Refills: 0 | Status: COMPLETED | OUTPATIENT
Start: 2019-01-08 | End: 2019-01-08

## 2019-01-08 RX ORDER — KETOROLAC TROMETHAMINE 30 MG/ML
30 SYRINGE (ML) INJECTION ONCE
Qty: 0 | Refills: 0 | Status: DISCONTINUED | OUTPATIENT
Start: 2019-01-08 | End: 2019-01-08

## 2019-01-08 RX ORDER — CEFTRIAXONE 500 MG/1
1 INJECTION, POWDER, FOR SOLUTION INTRAMUSCULAR; INTRAVENOUS ONCE
Qty: 0 | Refills: 0 | Status: COMPLETED | OUTPATIENT
Start: 2019-01-08 | End: 2019-01-08

## 2019-01-08 RX ORDER — MORPHINE SULFATE 50 MG/1
4 CAPSULE, EXTENDED RELEASE ORAL ONCE
Qty: 0 | Refills: 0 | Status: DISCONTINUED | OUTPATIENT
Start: 2019-01-08 | End: 2019-01-08

## 2019-01-08 RX ORDER — SODIUM CHLORIDE 9 MG/ML
1000 INJECTION INTRAMUSCULAR; INTRAVENOUS; SUBCUTANEOUS
Qty: 0 | Refills: 0 | Status: DISCONTINUED | OUTPATIENT
Start: 2019-01-08 | End: 2019-01-12

## 2019-01-08 RX ADMIN — CEFTRIAXONE 100 GRAM(S): 500 INJECTION, POWDER, FOR SOLUTION INTRAMUSCULAR; INTRAVENOUS at 21:35

## 2019-01-08 RX ADMIN — ONDANSETRON 4 MILLIGRAM(S): 8 TABLET, FILM COATED ORAL at 16:31

## 2019-01-08 RX ADMIN — ONDANSETRON 4 MILLIGRAM(S): 8 TABLET, FILM COATED ORAL at 23:15

## 2019-01-08 RX ADMIN — SODIUM CHLORIDE 1000 MILLILITER(S): 9 INJECTION INTRAMUSCULAR; INTRAVENOUS; SUBCUTANEOUS at 16:31

## 2019-01-08 RX ADMIN — MORPHINE SULFATE 4 MILLIGRAM(S): 50 CAPSULE, EXTENDED RELEASE ORAL at 16:31

## 2019-01-08 RX ADMIN — Medication 10 MILLIGRAM(S): at 21:36

## 2019-01-08 RX ADMIN — ONDANSETRON 4 MILLIGRAM(S): 8 TABLET, FILM COATED ORAL at 18:00

## 2019-01-08 RX ADMIN — HYDROMORPHONE HYDROCHLORIDE 1 MILLIGRAM(S): 2 INJECTION INTRAMUSCULAR; INTRAVENOUS; SUBCUTANEOUS at 17:52

## 2019-01-08 RX ADMIN — SODIUM CHLORIDE 200 MILLILITER(S): 9 INJECTION INTRAMUSCULAR; INTRAVENOUS; SUBCUTANEOUS at 23:15

## 2019-01-08 RX ADMIN — Medication 30 MILLIGRAM(S): at 16:32

## 2019-01-08 NOTE — ED ADULT NURSE NOTE - OBJECTIVE STATEMENT
39 yo F aaox4 patient arrived ambulatory from triage. C/o of  R abd radiating to flank pain. Onset of pain earlier this morning. Pain rated 8/10 and intermittent. Described as sharp/ Reports seeing blood in urine when voiding. Denies any blood clots. Denies urinary freq, urgency, or dysuria. No fever or chills noted. No rebound tenderness on b/l flanks. labs drawn. IV line with 1L NS administered. MD at bedside.

## 2019-01-08 NOTE — ED CDU PROVIDER INITIAL DAY NOTE - OBJECTIVE STATEMENT
39 y/o female with a PMHx of asthma, kidney stones s/p right renal stent presents last year, last admission for kidney stone in Nov 2018 at [facility], never followed up with urology after stent, present to the ED c/o right flank pain since 5am w/ nausea Pain is worse with walking, movement, deep breathing now is stabbing in nature , no fever ,no chills no hematuria LMP x3 weeks ago. 39 y/o female with a PMHx of asthma, kidney stones s/p right renal stent presents last year, last admission for kidney stone in Nov 2018 at [facility], never followed up with urology after stent, present to the ED c/o right flank pain since 5am w/ nausea Pain is worse with walking, movement, deep breathing now is stabbing in nature , no fever ,no chills no hematuria LMP x3 weeks ago.  CT scan (+) Stable right hydroureteronephrosis with nephroureteral stent in place. Multiple nonobstructing right intrarenal calculi, previously described on   11/19/2018, are not seen on today's examination. Four 3 mm calculi are anterior to the superior pigtail of the nephroureteral stent at the level of the right renal pelvis. 37 y/o female with a PMHx of asthma, kidney stones s/p right renal stent presents last year, last admission for kidney stone in Nov 2018 at [facility], never followed up with urology after stent, present to the ED c/o right flank pain since 5am w/ nausea Pain is worse with walking, movement, deep breathing now is stabbing in nature , no fever ,no chills no hematuria LMP x3 weeks ago. In ED, patient had CT scan (+) Stable right hydroureteronephrosis with nephroureteral stent in place. Multiple non obstructing right intrarenal calculi, previously described on 11/19/2018, are not seen on today's examination. Four 3 mm calculi are anterior to the superior pigtail of the nephroureteral stent at the level of the right renal pelvis. Pt evaluated by Urology and sent to CDU for IV hydration, pain/antiemetic, and frequent re-evaluations.

## 2019-01-08 NOTE — ED CDU PROVIDER INITIAL DAY NOTE - PHYSICAL EXAMINATION
GEN APPEARANCE: WDWN, alert and cooperative, non-toxic appearing and in NAD, appears very uncomfortabl   HEAD: Atraumatic, normocephalic   EYES: PERRLa, EOMI, vision grossly intact.   EARS: Gross hearing intact.   NOSE: No nasal discharge, no external evidence of epistaxis.   NECK: Supple  CV: RRR, S1S2, no c/r/m/g. No cyanosis or pallor. Extremities warm, well perfused. Cap refill <2 seconds. No bruits.   LUNGS: CTAB. No wheezing. No rales. No rhonchi. No diminished breath sounds.   ABDOMEN: RUQ ttp.+ guarding no rebound. No masses.   MSK: Spine appears normal, no spine point tenderness. R signifcant CVA ttp. No joint erythema or tenderness. Normal muscular development. Pelvis stable.  EXTREMITIES: No peripheral edema. No obvious joint or bony deformity.  NEURO: Alert, follows commands. Weight bearing normal. Speech normal. Sensation and motor normal x4 extremities.   SKIN: Normal color for race, warm, dry and intact. No evidence of rash.  PSYCH: Normal mood and affect. GEN APPEARANCE: WDWN, alert and cooperative, non-toxic appearing and in NAD, appears very uncomfortable  HEAD: Atraumatic, normocephalic   EYES: PERRLa, EOMI, vision grossly intact.   EARS: Gross hearing intact.   NOSE: No nasal discharge, no external evidence of epistaxis.   NECK: Supple  CV: RRR, S1S2, no c/r/m/g. No cyanosis or pallor. Extremities warm, well perfused. Cap refill <2 seconds. No bruits.   LUNGS: CTAB. No wheezing. No rales. No rhonchi. No diminished breath sounds.   ABDOMEN: RUQ ttp.+ guarding no rebound. No masses.   MSK: Spine appears normal, no spine point tenderness. R signifcant CVA ttp. No joint erythema or tenderness. Normal muscular development. Pelvis stable.  EXTREMITIES: No peripheral edema. No obvious joint or bony deformity.  NEURO: Alert, follows commands. Weight bearing normal. Speech normal. Sensation and motor normal x4 extremities.   SKIN: Normal color for race, warm, dry and intact. No evidence of rash.

## 2019-01-08 NOTE — ED PROVIDER NOTE - PHYSICAL EXAMINATION
GEN APPEARANCE: WDWN, alert and cooperative, non-toxic appearing and in NAD, appears very uncomfortabl   HEAD: Atraumatic, normocephalic   EYES: PERRLa, EOMI, vision grossly intact.   EARS: Gross hearing intact.   NOSE: No nasal discharge, no external evidence of epistaxis.   NECK: Supple  CV: RRR, S1S2, no c/r/m/g. No cyanosis or pallor. Extremities warm, well perfused. Cap refill <2 seconds. No bruits.   LUNGS: CTAB. No wheezing. No rales. No rhonchi. No diminished breath sounds.   ABDOMEN: RUQ ttp.+ guarding no rebound. No masses.   MSK: Spine appears normal, no spine point tenderness. R signifcant CVA ttp. No joint erythema or tenderness. Normal muscular development. Pelvis stable.  EXTREMITIES: No peripheral edema. No obvious joint or bony deformity.  NEURO: Alert, follows commands. Weight bearing normal. Speech normal. Sensation and motor normal x4 extremities.   SKIN: Normal color for race, warm, dry and intact. No evidence of rash.  PSYCH: Normal mood and affect.

## 2019-01-08 NOTE — ED CDU PROVIDER INITIAL DAY NOTE - PROGRESS NOTE DETAILS
CDU PROGRESS NOTE PA JENNIFER: Pt c/o increasing nausea and dizziness. Pt NAD, aox3, speaking coherently, No orthostatic changes with vitals. Will continue with IV hydration and Zofran 4mg IVP. Will continue to monitor.

## 2019-01-08 NOTE — ED PROVIDER NOTE - MEDICAL DECISION MAKING DETAILS
38 y old f with reccurant renal colic ,has a rt kidney stent for more when year  ,came in with severe rt flank pain and nausea since  5 am ,similar to her previous stones ,no fever or hematuria ,will address pain with pain meds ,blood work .ua and ct scan and reeval ZR

## 2019-01-08 NOTE — CONSULT NOTE ADULT - ASSESSMENT
34 y/o female with right retained ureteral stent with flank pain, n/v    Rec:  no acute  intervention  pain control  antiemetics  hydration  pt will ultimately need stent removal +/-PCN  pt with numerous ED visits over last year for same complaint and has always been able to be pain controlled and ultimately discharged  NPO after midnight  will re-evaluate in am  discussed with urology attending CHRISTIANO Khan MD

## 2019-01-08 NOTE — ED ADULT NURSE NOTE - CHPI ED NUR SYMPTOMS NEG
no abdominal distension/no diarrhea/no nausea/no fever/no dysuria/no blood in stool/no burning urination/no chills

## 2019-01-08 NOTE — ED ADULT NURSE NOTE - NSIMPLEMENTINTERV_GEN_ALL_ED
Implemented All Universal Safety Interventions:  [place] to call system. Call bell, personal items and telephone within reach. Instruct patient to call for assistance. Room bathroom lighting operational. Non-slip footwear when patient is off stretcher. Physically safe environment: no spills, clutter or unnecessary equipment. Stretcher in lowest position, wheels locked, appropriate side rails in place.

## 2019-01-08 NOTE — ED CDU PROVIDER INITIAL DAY NOTE - MEDICAL DECISION MAKING DETAILS
38 y.o female w/ recurrent kidney stone w/ stent x 1 yr right kidney. Has not followed with urology for removal. presented w/ severe right flank pain, nausea. No fever or chills.   Stable right hydroureteronephrosis with nephroureteral stent in place. CT revealed Multiple nonobstructing right intrarenal calculi, previously described on 11/19/2018, are not seen on today's examination. Four 3 mm calculi are anterior to the superior pigtail of the nephroureteral stent at the level of the right renal pelvis. Pt seen by urology in ER for possible admission and removal of stent. Decided to send patient to CDU for pain control and schedule for outpatient procedure. ZR 38 y.o female w/ recurrent kidney stone w/ stent x 1 yr right kidney. Has not followed with urology for removal. presented w/ severe right flank pain, nausea. No fever or chills. CT revealed Stable right hydroureteronephrosis with nephroureteral stent in place. Multiple nonobstructing right intrarenal calculi, previously described on 11/19/2018, are not seen on today's examination. Four 3 mm calculi are anterior to the superior pigtail of the nephroureteral stent at the level of the right renal pelvis. Pt seen by urology in ER for possible admission and removal of stent. Decided to send patient to CDU for pain control and schedule for outpatient procedure. ZR

## 2019-01-08 NOTE — ED PROVIDER NOTE - OBJECTIVE STATEMENT
39 y/o female with a PMHx of asthma, kidney stones s/p right renal stent presents last year, last admission for kidney stone in Nov 2018 at [facility], never followed up with urology after stent, present to the ED c/o right flank pain since 5am w/ nausea Pain is worse with walking, movement, deep breathing now is stabbing in nature , no fever ,no chills no hematuria LMP x3 weeks ago.

## 2019-01-08 NOTE — CONSULT NOTE ADULT - SUBJECTIVE AND OBJECTIVE BOX
HPI:  Patient is a 38y Female who presented with right flank pain x 1 day.  pt with hx of nephrolithiasis s/p R ESWL with outside urologist late , followed by two other lithotripsies with stent placements on the same side with same urologist (Thomas Peñaloza MD) Last procedure was 2017. pt lost to follow up and having difficulty getting an appointment with an urologist. Over last year patient has been having intermittent flank pain and numerous ER visits and ultimately discharged after pain adequately controlled  Pt with intermittent hematuria, no dysuria, no fever, no chills. +nausea/vomiting       PAST MEDICAL & SURGICAL HISTORY:  Cervical incompetence  Renal calculus  Hypothyroidism  Cholelithiasis  Asthma  Kidney stone on right side: Oct 2016  H/O lithotripsy  S/P nasal septoplasty: in   History of Cholecystectomy: in   H/O:  Section x 1: in     FAMILY HISTORY:  Family history of type 2 diabetes mellitus  Family history of renal cancer  Family history of type 2 diabetes mellitus (Grandparent)    SOCIAL HISTORY:   Tobacco hx:    MEDICATIONS  (STANDING):  cefTRIAXone   IVPB 1 Gram(s) IV Intermittent Once    MEDICATIONS  (PRN):    Allergies    doxycycline (Hives)  Levaquin (Hives)  penicillin (Hives)    Intolerances        REVIEW OF SYSTEMS: Pertinent positives and negatives as stated in HPI, otherwise negative    Vital signs  T(C): 37 (19 @ 15:49), Max: 37 (19 @ 15:49)  HR: 85 (19 @ 15:49)  BP: 120/84 (19 @ 15:49)  SpO2: 100% (19 @ 15:49)  Wt(kg): --    Output    UOP    Physical Exam  Gen: NAD  Pulm: No intercostal retractions  Back: +R CVAT  Abd: Soft, non distended, some pain to RLQ with deep palpation  : wnl    LABS:     @ 16:44    WBC 9.5   / Hct 34.3  / SCr 0.78         138  |  103  |  14  ----------------------------<  91  4.2   |  21<L>  |  0.78    Ca    9.3      2019 16:44    TPro  7.6  /  Alb  4.5  /  TBili  0.7  /  DBili  x   /  AST  13  /  ALT  12  /  AlkPhos  69        Urinalysis Basic - ( 2019 17:37 )    Color: Light Yellow / Appearance: Clear / S.018 / pH: x  Gluc: x / Ketone: Small  / Bili: Negative / Urobili: Negative   Blood: x / Protein: 30 mg/dL / Nitrite: Negative   Leuk Esterase: Large / RBC: 11 /hpf / WBC 24 /hpf   Sq Epi: x / Non Sq Epi: 2 /hpf / Bacteria: Few        RADIOLOGY:  EXAM:  CT ABDOMEN AND PELVIS                            PROCEDURE DATE:  2019            INTERPRETATION:  CLINICAL INFORMATION: History of kidney stones.   Hematuria. Bilateral flank pain.      COMPARISON: CT abdomen and pelvis performed 2018.    PROCEDURE:   CT of the Abdomen and Pelvis was performed without intravenous contrast   in the prone position.  Intravenous contrast: None.  Oral contrast: None.  Sagittal and coronal reformats were performed.    FINDINGS:    LOWER CHEST: Within normal limits.    LIVER: Within normal limits.  BILE DUCTS: Normal caliber.  GALLBLADDER: Cholecystectomy.  SPLEEN: Within normal limits.  PANCREAS: Within normal limits.  ADRENALS: Within normal limits.  KIDNEYS/URETERS: Stable moderate right hydroureteronephrosis status post   nephroureteral stent placement. The nonobstructing right intrarenal   calculi, previously described on 2018, are not seen on today's   examination. Four similarly sized calculi measuring approximately 3 mm   are seen anterior to the superior pigtail of the stent at the level of   the right renal pelvis (series 3, image 53). No left renal calculi or   left hydronephrosis.    BLADDER: Within normal limits.  REPRODUCTIVE ORGANS: Uterus and adnexa are within normal limits.    BOWEL: No bowel obstruction. Colonic diverticulosis. Appendix is normal.  PERITONEUM: No ascites.  VESSELS:  Within normal limits.  RETROPERITONEUM: No lymphadenopathy.    ABDOMINAL WALL: Within normal limits.  BONES: Degenerative changes.    IMPRESSION:     Stable right hydroureteronephrosis with nephroureteral stent in place.   Multiple nonobstructing right intrarenal calculi, previously described on   2018, are not seen on today's examination. Four 3 mm calculi are   anterior to the superior pigtail of the nephroureteral stent at the level   of the right renal pelvis.        CHUCK MELCHOR M.D., RADIOLOGY RESIDENT  This document has been electronically signed.  WILFRED BAUER M.D., ATTENDING RADIOLOGIST  This document has been electronically signed. 2019  7:06PM HPI:  Patient is a 38y Female who presented with right flank pain x 1 day.  pt with hx of nephrolithiasis s/p R ESWL with outside urologist late , followed by two stent exchanges on the same side with same urologist (Thomas Peñaloza MD) Last procedure was 2017. pt lost to follow up and having difficulty getting an appointment with an urologist. Over last year patient has been having intermittent flank pain and numerous ER visits and ultimately discharged after pain adequately controlled  Pt with intermittent hematuria, no dysuria, no fever, no chills. +nausea/vomiting       PAST MEDICAL & SURGICAL HISTORY:  Cervical incompetence  Renal calculus  Hypothyroidism  Cholelithiasis  Asthma  Kidney stone on right side: Oct 2016  H/O lithotripsy  S/P nasal septoplasty: in   History of Cholecystectomy: in   H/O:  Section x 1: in     FAMILY HISTORY:  Family history of type 2 diabetes mellitus  Family history of renal cancer  Family history of type 2 diabetes mellitus (Grandparent)    SOCIAL HISTORY:   Tobacco hx:    MEDICATIONS  (STANDING):  cefTRIAXone   IVPB 1 Gram(s) IV Intermittent Once    MEDICATIONS  (PRN):    Allergies    doxycycline (Hives)  Levaquin (Hives)  penicillin (Hives)    Intolerances        REVIEW OF SYSTEMS: Pertinent positives and negatives as stated in HPI, otherwise negative    Vital signs  T(C): 37 (19 @ 15:49), Max: 37 (19 @ 15:49)  HR: 85 (19 @ 15:49)  BP: 120/84 (19 @ 15:49)  SpO2: 100% (19 @ 15:49)  Wt(kg): --    Output    UOP    Physical Exam  Gen: NAD  Pulm: No intercostal retractions  Back: +R CVAT  Abd: Soft, non distended, some pain to RLQ with deep palpation  : wnl    LABS:     @ 16:44    WBC 9.5   / Hct 34.3  / SCr 0.78         138  |  103  |  14  ----------------------------<  91  4.2   |  21<L>  |  0.78    Ca    9.3      2019 16:44    TPro  7.6  /  Alb  4.5  /  TBili  0.7  /  DBili  x   /  AST  13  /  ALT  12  /  AlkPhos  69        Urinalysis Basic - ( 2019 17:37 )    Color: Light Yellow / Appearance: Clear / S.018 / pH: x  Gluc: x / Ketone: Small  / Bili: Negative / Urobili: Negative   Blood: x / Protein: 30 mg/dL / Nitrite: Negative   Leuk Esterase: Large / RBC: 11 /hpf / WBC 24 /hpf   Sq Epi: x / Non Sq Epi: 2 /hpf / Bacteria: Few        RADIOLOGY:  EXAM:  CT ABDOMEN AND PELVIS                            PROCEDURE DATE:  2019            INTERPRETATION:  CLINICAL INFORMATION: History of kidney stones.   Hematuria. Bilateral flank pain.      COMPARISON: CT abdomen and pelvis performed 2018.    PROCEDURE:   CT of the Abdomen and Pelvis was performed without intravenous contrast   in the prone position.  Intravenous contrast: None.  Oral contrast: None.  Sagittal and coronal reformats were performed.    FINDINGS:    LOWER CHEST: Within normal limits.    LIVER: Within normal limits.  BILE DUCTS: Normal caliber.  GALLBLADDER: Cholecystectomy.  SPLEEN: Within normal limits.  PANCREAS: Within normal limits.  ADRENALS: Within normal limits.  KIDNEYS/URETERS: Stable moderate right hydroureteronephrosis status post   nephroureteral stent placement. The nonobstructing right intrarenal   calculi, previously described on 2018, are not seen on today's   examination. Four similarly sized calculi measuring approximately 3 mm   are seen anterior to the superior pigtail of the stent at the level of   the right renal pelvis (series 3, image 53). No left renal calculi or   left hydronephrosis.    BLADDER: Within normal limits.  REPRODUCTIVE ORGANS: Uterus and adnexa are within normal limits.    BOWEL: No bowel obstruction. Colonic diverticulosis. Appendix is normal.  PERITONEUM: No ascites.  VESSELS:  Within normal limits.  RETROPERITONEUM: No lymphadenopathy.    ABDOMINAL WALL: Within normal limits.  BONES: Degenerative changes.    IMPRESSION:     Stable right hydroureteronephrosis with nephroureteral stent in place.   Multiple nonobstructing right intrarenal calculi, previously described on   2018, are not seen on today's examination. Four 3 mm calculi are   anterior to the superior pigtail of the nephroureteral stent at the level   of the right renal pelvis.        CHUCK MELCHOR M.D., RADIOLOGY RESIDENT  This document has been electronically signed.  WILFRED BAUER M.D., ATTENDING RADIOLOGIST  This document has been electronically signed. 2019  7:06PM

## 2019-01-09 VITALS
OXYGEN SATURATION: 100 % | DIASTOLIC BLOOD PRESSURE: 79 MMHG | SYSTOLIC BLOOD PRESSURE: 135 MMHG | TEMPERATURE: 99 F | RESPIRATION RATE: 18 BRPM | HEART RATE: 91 BPM

## 2019-01-09 LAB
ANION GAP SERPL CALC-SCNC: 10 MMOL/L — SIGNIFICANT CHANGE UP (ref 5–17)
APTT BLD: 29.1 SEC — SIGNIFICANT CHANGE UP (ref 27.5–36.3)
BASOPHILS # BLD AUTO: 0.1 K/UL — SIGNIFICANT CHANGE UP (ref 0–0.2)
BASOPHILS NFR BLD AUTO: 0.6 % — SIGNIFICANT CHANGE UP (ref 0–2)
BUN SERPL-MCNC: 15 MG/DL — SIGNIFICANT CHANGE UP (ref 7–23)
CALCIUM SERPL-MCNC: 8.5 MG/DL — SIGNIFICANT CHANGE UP (ref 8.4–10.5)
CHLORIDE SERPL-SCNC: 110 MMOL/L — HIGH (ref 96–108)
CO2 SERPL-SCNC: 20 MMOL/L — LOW (ref 22–31)
CREAT SERPL-MCNC: 0.81 MG/DL — SIGNIFICANT CHANGE UP (ref 0.5–1.3)
CULTURE RESULTS: SIGNIFICANT CHANGE UP
EOSINOPHIL # BLD AUTO: 0.1 K/UL — SIGNIFICANT CHANGE UP (ref 0–0.5)
EOSINOPHIL NFR BLD AUTO: 1.5 % — SIGNIFICANT CHANGE UP (ref 0–6)
GLUCOSE SERPL-MCNC: 99 MG/DL — SIGNIFICANT CHANGE UP (ref 70–99)
HCT VFR BLD CALC: 29.4 % — LOW (ref 34.5–45)
HGB BLD-MCNC: 9.5 G/DL — LOW (ref 11.5–15.5)
INR BLD: 1.09 RATIO — SIGNIFICANT CHANGE UP (ref 0.88–1.16)
LYMPHOCYTES # BLD AUTO: 1.8 K/UL — SIGNIFICANT CHANGE UP (ref 1–3.3)
LYMPHOCYTES # BLD AUTO: 18.6 % — SIGNIFICANT CHANGE UP (ref 13–44)
MCHC RBC-ENTMCNC: 25.2 PG — LOW (ref 27–34)
MCHC RBC-ENTMCNC: 32.4 GM/DL — SIGNIFICANT CHANGE UP (ref 32–36)
MCV RBC AUTO: 77.8 FL — LOW (ref 80–100)
MONOCYTES # BLD AUTO: 0.7 K/UL — SIGNIFICANT CHANGE UP (ref 0–0.9)
MONOCYTES NFR BLD AUTO: 7 % — SIGNIFICANT CHANGE UP (ref 2–14)
NEUTROPHILS # BLD AUTO: 6.8 K/UL — SIGNIFICANT CHANGE UP (ref 1.8–7.4)
NEUTROPHILS NFR BLD AUTO: 72.3 % — SIGNIFICANT CHANGE UP (ref 43–77)
PLATELET # BLD AUTO: 373 K/UL — SIGNIFICANT CHANGE UP (ref 150–400)
POTASSIUM SERPL-MCNC: 4.2 MMOL/L — SIGNIFICANT CHANGE UP (ref 3.5–5.3)
POTASSIUM SERPL-SCNC: 4.2 MMOL/L — SIGNIFICANT CHANGE UP (ref 3.5–5.3)
PROTHROM AB SERPL-ACNC: 12.6 SEC — SIGNIFICANT CHANGE UP (ref 10–12.9)
RBC # BLD: 3.77 M/UL — LOW (ref 3.8–5.2)
RBC # FLD: 16.7 % — HIGH (ref 10.3–14.5)
SODIUM SERPL-SCNC: 140 MMOL/L — SIGNIFICANT CHANGE UP (ref 135–145)
SPECIMEN SOURCE: SIGNIFICANT CHANGE UP
WBC # BLD: 9.4 K/UL — SIGNIFICANT CHANGE UP (ref 3.8–10.5)
WBC # FLD AUTO: 9.4 K/UL — SIGNIFICANT CHANGE UP (ref 3.8–10.5)

## 2019-01-09 PROCEDURE — 80048 BASIC METABOLIC PNL TOTAL CA: CPT

## 2019-01-09 PROCEDURE — 96376 TX/PRO/DX INJ SAME DRUG ADON: CPT

## 2019-01-09 PROCEDURE — 85027 COMPLETE CBC AUTOMATED: CPT

## 2019-01-09 PROCEDURE — 81025 URINE PREGNANCY TEST: CPT

## 2019-01-09 PROCEDURE — G0378: CPT

## 2019-01-09 PROCEDURE — 99217: CPT

## 2019-01-09 PROCEDURE — 81001 URINALYSIS AUTO W/SCOPE: CPT

## 2019-01-09 PROCEDURE — 80053 COMPREHEN METABOLIC PANEL: CPT

## 2019-01-09 PROCEDURE — 85730 THROMBOPLASTIN TIME PARTIAL: CPT

## 2019-01-09 PROCEDURE — 83690 ASSAY OF LIPASE: CPT

## 2019-01-09 PROCEDURE — 96374 THER/PROPH/DIAG INJ IV PUSH: CPT

## 2019-01-09 PROCEDURE — 74176 CT ABD & PELVIS W/O CONTRAST: CPT

## 2019-01-09 PROCEDURE — 87086 URINE CULTURE/COLONY COUNT: CPT

## 2019-01-09 PROCEDURE — 85610 PROTHROMBIN TIME: CPT

## 2019-01-09 PROCEDURE — 99284 EMERGENCY DEPT VISIT MOD MDM: CPT | Mod: 25

## 2019-01-09 PROCEDURE — 96375 TX/PRO/DX INJ NEW DRUG ADDON: CPT | Mod: XU

## 2019-01-09 RX ORDER — ONDANSETRON 8 MG/1
1 TABLET, FILM COATED ORAL
Qty: 12 | Refills: 0 | OUTPATIENT
Start: 2019-01-09 | End: 2019-01-11

## 2019-01-09 RX ORDER — OXYCODONE HYDROCHLORIDE 5 MG/1
1 TABLET ORAL
Qty: 12 | Refills: 0 | OUTPATIENT
Start: 2019-01-09 | End: 2019-01-11

## 2019-01-09 RX ORDER — ACETAMINOPHEN 500 MG
1000 TABLET ORAL ONCE
Qty: 0 | Refills: 0 | Status: COMPLETED | OUTPATIENT
Start: 2019-01-09 | End: 2019-01-09

## 2019-01-09 RX ORDER — SODIUM CHLORIDE 9 MG/ML
3 INJECTION INTRAMUSCULAR; INTRAVENOUS; SUBCUTANEOUS EVERY 8 HOURS
Qty: 0 | Refills: 0 | Status: DISCONTINUED | OUTPATIENT
Start: 2019-01-09 | End: 2019-01-12

## 2019-01-09 RX ORDER — MORPHINE SULFATE 50 MG/1
4 CAPSULE, EXTENDED RELEASE ORAL ONCE
Qty: 0 | Refills: 0 | Status: DISCONTINUED | OUTPATIENT
Start: 2019-01-09 | End: 2019-01-09

## 2019-01-09 RX ORDER — ONDANSETRON 8 MG/1
4 TABLET, FILM COATED ORAL ONCE
Qty: 0 | Refills: 0 | Status: COMPLETED | OUTPATIENT
Start: 2019-01-09 | End: 2019-01-09

## 2019-01-09 RX ORDER — OXYCODONE HYDROCHLORIDE 5 MG/1
5 TABLET ORAL ONCE
Qty: 0 | Refills: 0 | Status: DISCONTINUED | OUTPATIENT
Start: 2019-01-09 | End: 2019-01-09

## 2019-01-09 RX ADMIN — Medication 1000 MILLIGRAM(S): at 03:00

## 2019-01-09 RX ADMIN — SODIUM CHLORIDE 200 MILLILITER(S): 9 INJECTION INTRAMUSCULAR; INTRAVENOUS; SUBCUTANEOUS at 04:46

## 2019-01-09 RX ADMIN — ONDANSETRON 4 MILLIGRAM(S): 8 TABLET, FILM COATED ORAL at 08:46

## 2019-01-09 RX ADMIN — Medication 400 MILLIGRAM(S): at 02:10

## 2019-01-09 RX ADMIN — OXYCODONE HYDROCHLORIDE 5 MILLIGRAM(S): 5 TABLET ORAL at 08:46

## 2019-01-09 RX ADMIN — MORPHINE SULFATE 4 MILLIGRAM(S): 50 CAPSULE, EXTENDED RELEASE ORAL at 04:45

## 2019-01-09 RX ADMIN — SODIUM CHLORIDE 3 MILLILITER(S): 9 INJECTION INTRAMUSCULAR; INTRAVENOUS; SUBCUTANEOUS at 05:54

## 2019-01-09 NOTE — ED CDU PROVIDER SUBSEQUENT DAY NOTE - HISTORY
CDU PROGRESS NOTE ESTEVAN RODRIGUEZ: Pt A&O x 3, resting in stretcher. Pt c/o nausea, and dizziness like the room is spinning. Pt receiving IV fluids, No orthostatic changes in BP. Will continue to monitor

## 2019-01-09 NOTE — ED ADULT NURSE REASSESSMENT NOTE - NSIMPLEMENTINTERV_GEN_ALL_ED
Implemented All Universal Safety Interventions:  [place] to call system. Call bell, personal items and telephone within reach. Instruct patient to call for assistance. Room bathroom lighting operational. Non-slip footwear when patient is off stretcher. Physically safe environment: no spills, clutter or unnecessary equipment. Stretcher in lowest position, wheels locked, appropriate side rails in place.

## 2019-01-09 NOTE — ED ADULT NURSE REASSESSMENT NOTE - NS ED NURSE REASSESS COMMENT FT1
15.00 Pt is evaluated by CDU MD Dr lCiff Antonio  Pt is Discharged Ml out  PA Mario Menon explained the follow up care  Pt verbalized the understanding up  follow up care. Pt is pain free Steady gait  A&OX 4  steady vitals at the time of discharges Pt stable to go home
Pt received from GEORGE Bautista @ 2393. Pt oriented to CDU & plan of care was discussed. Pt A&O x 4. Pt in CDU for IV fluids, and pain control. Pt c/o nausea, and dizziness like the room is spinning. Pt states the dizziness has been happening all day. Orthostatic BP taken see flowsheet. Pt denies pain, urinary frequency, or urgency. Safety & comfort measures maintained. Call bell in reach. Will continue to monitor.
pt complained of increased pain, reassessed pt including repeating ron signs. Spoke with mobile rn, pt will be assigned shortly. Pt made aware.
07.00 [facility] Received Report from  GEORGE Fowler  pt is observed for Right Renal colic for pain management    08.00 Am Pt reassessed .Pt is  A&O x 4.Pt C/O her Right sided colic pain is coming  back with nausea pt medicated as per order  Pt denies  CP SOB Fever chills  IV site looks clean & dry  IV infusing well  Safety & comfort measures maintained. Call bell in reach. Will continue to monitor. Pt is NPO  pt is awaiting for urology consult

## 2019-01-09 NOTE — ED CDU PROVIDER SUBSEQUENT DAY NOTE - PROGRESS NOTE DETAILS
CDU PROGRESS NOTE PA JENNIFER: Pt c/o 6/10 pain to right flank, increasing in severity. Abdomen non distended, soft, no rebound or guarding. + ttp right flank area. Will order Ofirmev 1gm IVPB and continue to monitor. CDU PROGRESS NOTE PA JENNIFER: Pt c/o 8/10 pain to right flank, Abdomen non distended, soft, no rebound or guarding. + ttp right flank area. Will order Morphine 4mg IVP and continue to monitor. c/d/w Urology, will order pre-op labs and keep patient NPO. Patient seen at bedside in NAD.  VSS.  Patient resting comfortably without complaints. Patient reports that her pain is much better controlled.  Seen by urology this AM, recommending PO pain medication as needed, NPO otherwise and they will reevaluate patient in 2 hrs.  -Mario Menon PA-C Patient seen at bedside in NAD.  VSS.  Patient resting comfortably without complaints. Will await final urology recs. -Mario Menon PA-C Urology recommending DC home with close outpatient follow up.  Patient currently pain free, tolerating PO and agreeable to plan.  -Mario Menon PA-C Urology recommending DC home off antibiotics pedning culture with close outpatient follow up.  Patient currently pain free, tolerating PO and agreeable to plan.  -Mario Menon PA-C

## 2019-01-09 NOTE — PROGRESS NOTE ADULT - SUBJECTIVE AND OBJECTIVE BOX
Subjective  Pt w/right flank discomfort overnight. Remains afebrile. This AM, still w/some right sided discomfort.    Objective    Vital signs  T(F): , Max: 98.9 (01-09-19 @ 07:20)  HR: 70 (01-09-19 @ 07:20)  BP: 105/67 (01-09-19 @ 07:20)  SpO2: 100% (01-09-19 @ 07:20)  Wt(kg): --    Output       Gen: NAD  Abd: soft, NT, ND  Back: R CVAT    Labs      01-09 @ 05:45    WBC 9.4   / Hct 29.4  / SCr 0.81     01-08 @ 16:44    WBC 9.5   / Hct 34.3  / SCr 0.78     Imaging

## 2019-01-09 NOTE — ED CDU PROVIDER DISPOSITION NOTE - CLINICAL COURSE
39 y/o female with a PMHx of asthma, kidney stones s/p right renal stent presents last year, last admission for kidney stone in Nov 2018 at [facility], never followed up with urology after stent, present to the ED c/o right flank pain since 5am w/ nausea Pain is worse with walking, movement, deep breathing now is stabbing in nature , no fever ,no chills no hematuria LMP x3 weeks ago. In ED, patient had CT scan (+) Stable right hydroureteronephrosis with nephroureteral stent in place. Multiple non obstructing right intrarenal calculi, previously described on 11/19/2018, are not seen on today's examination. Four 3 mm calculi are anterior to the superior pigtail of the nephroureteral stent at the level of the right renal pelvis. Pt evaluated by Urology and sent to CDU for IV hydration, pain/antiemetic, and frequent re-evaluations. 37 y/o female with a PMHx of asthma, kidney stones s/p right renal stent presents last year, last admission for kidney stone in Nov 2018 at [facility], never followed up with urology after stent, present to the ED c/o right flank pain since 5am w/ nausea Pain is worse with walking, movement, deep breathing now is stabbing in nature , no fever ,no chills no hematuria LMP x3 weeks ago. In ED, patient had CT scan (+) Stable right hydroureteronephrosis with nephroureteral stent in place. Multiple non obstructing right intrarenal calculi, previously described on 11/19/2018, are not seen on today's examination. Four 3 mm calculi are anterior to the superior pigtail of the nephroureteral stent at the level of the right renal pelvis. Pt evaluated by Urology and sent to CDU for IV hydration, pain/antiemetic, and frequent re-evaluations.  Patient's pain was well controlled overnight, now on PO analgesia.  Tolerating PO.  Urology recommending outpatient follow up to plan for stent removal.  Patient medically stable for discharge home.

## 2019-01-09 NOTE — ED CDU PROVIDER DISPOSITION NOTE - ATTENDING CONTRIBUTION TO CARE
CDU Attending Note -- Pt seen and examined at bedside.  Case discussed c CDU PA.  Pt comfortable, asymptomatic, and has good follow up.  Uro recommendations reviewed.  Patient prefers outpt mgmt but understands likelihood of conservative tx failure.  Nontoxic, afebrile, not septic, tolerating PO.  Noted to have UTI.  Strict return precautions discussed.  D/C.  --KYLE

## 2019-01-09 NOTE — ED CDU PROVIDER DISPOSITION NOTE - PLAN OF CARE
STAY HYDRATED and Strain Urine. Follow up with your Primary Care Physician within the next 2-3 days as well as urology clinic [phone]. Bring a copy of your test results with you to your appointment  Continue your current medication regim en. Return to the Emergency Room if you experience new or worsening symptoms .Oxycodone 5mg every 6 hours as needed for pain. Do not drive or consume alcohol while taking. Take Flomax once daily. Take Motrin 400-600mg every 6 hrs as needed for pain. Take with food 1.  Stay Hydrated  2.  Take Ibuprofen 600mgs every 6 hrs as needed for mild/moderate pain.  Take Oxycodone 5mgs every 6 hrs as needed for severe pain  3.  Follow up with your PMD in 2-3 days       Follow up with Urology upon discharge, either Emre Ford, or [facility].  Call to make an appointment [phone]  4.  Return to the ER for worsening pain, persistent nausea/vomiting, fevers/chills or any other concerning symptoms

## 2019-01-09 NOTE — PROGRESS NOTE ADULT - ASSESSMENT
32 y/o female with right retained ureteral stent with flank pain, n/v  -- pt w/persistent discomfort requiring IV analgesia  -- trial of PO analgesia  -- if pain not alleviated w/PO analgesia, may require consultation w/IR for NT placement  -- keep NPO, IVF  -- antiemetics PRN  -- will reevaluate 32 y/o female with right retained ureteral stent with flank pain, n/v  -- pt w/persistent discomfort requiring IV analgesia  -- trial of PO analgesia  -- if pain not alleviated w/PO analgesia, may require consultation w/IR for NT placement  -- keep NPO, IVF  -- antiemetics PRN  -- will reevaluate     **addendum 14:00**  -- pt w/improved pain on PO analgesia  -- pt to f/u w/Emre Ford, or [facility] at [facility]

## 2019-01-17 ENCOUNTER — APPOINTMENT (OUTPATIENT)
Dept: UROLOGY | Facility: CLINIC | Age: 39
End: 2019-01-17
Payer: COMMERCIAL

## 2019-01-17 VITALS
DIASTOLIC BLOOD PRESSURE: 88 MMHG | RESPIRATION RATE: 17 BRPM | BODY MASS INDEX: 30.34 KG/M2 | WEIGHT: 191 LBS | HEART RATE: 83 BPM | HEIGHT: 66.5 IN | SYSTOLIC BLOOD PRESSURE: 135 MMHG

## 2019-01-17 DIAGNOSIS — Z84.1 FAMILY HISTORY OF DISORDERS OF KIDNEY AND URETER: ICD-10-CM

## 2019-01-17 DIAGNOSIS — Z87.442 PERSONAL HISTORY OF URINARY CALCULI: ICD-10-CM

## 2019-01-17 DIAGNOSIS — Z80.51 FAMILY HISTORY OF MALIGNANT NEOPLASM OF KIDNEY: ICD-10-CM

## 2019-01-17 DIAGNOSIS — Z80.3 FAMILY HISTORY OF MALIGNANT NEOPLASM OF BREAST: ICD-10-CM

## 2019-01-17 DIAGNOSIS — Z63.5 DISRUPTION OF FAMILY BY SEPARATION AND DIVORCE: ICD-10-CM

## 2019-01-17 DIAGNOSIS — Z78.9 OTHER SPECIFIED HEALTH STATUS: ICD-10-CM

## 2019-01-17 LAB
BILIRUB UR QL STRIP: NORMAL
CLARITY UR: NORMAL
COLLECTION METHOD: NORMAL
GLUCOSE UR-MCNC: NORMAL
HCG UR QL: 0.2 EU/DL
HGB UR QL STRIP.AUTO: ABNORMAL
KETONES UR-MCNC: NORMAL
LEUKOCYTE ESTERASE UR QL STRIP: ABNORMAL
NITRITE UR QL STRIP: NORMAL
PH UR STRIP: 5
PROT UR STRIP-MCNC: ABNORMAL
SP GR UR STRIP: >1.03

## 2019-01-17 PROCEDURE — 81003 URINALYSIS AUTO W/O SCOPE: CPT | Mod: QW

## 2019-01-17 PROCEDURE — 99204 OFFICE O/P NEW MOD 45 MIN: CPT | Mod: 25

## 2019-01-17 SDOH — SOCIAL STABILITY - SOCIAL INSECURITY: DISRUPTION OF FAMILY BY SEPARATION AND DIVORCE: Z63.5

## 2019-01-17 NOTE — ASSESSMENT
[FreeTextEntry1] : Reviewed CT, KUB with patient and her .\par \par Impression:\par \par kidney stones\par hydronephrosis.\par calcified stent, bladder stone\par \par Plan:\par \par perc neph, laser of bladder stone on stent.\par I have discussed the risks, benefits and alternatives and she agrees to proceed.

## 2019-01-17 NOTE — HISTORY OF PRESENT ILLNESS
[FreeTextEntry1] : patient had stones since her 20's.  about 2 years ago had ESWL, subsequent stent, 2 more lithotripsies.\par Stent was in in place and she did not get her stent removed. She has been having abdomina and flank pain. no urgency. no frequency.\par Was seen in the [place] ER. Given appt.

## 2019-01-31 ENCOUNTER — OUTPATIENT (OUTPATIENT)
Dept: OUTPATIENT SERVICES | Facility: HOSPITAL | Age: 39
LOS: 1 days | End: 2019-01-31
Payer: COMMERCIAL

## 2019-01-31 VITALS
RESPIRATION RATE: 16 BRPM | SYSTOLIC BLOOD PRESSURE: 133 MMHG | DIASTOLIC BLOOD PRESSURE: 95 MMHG | HEART RATE: 89 BPM | TEMPERATURE: 98 F | WEIGHT: 202.83 LBS | HEIGHT: 67 IN

## 2019-01-31 DIAGNOSIS — Z29.9 ENCOUNTER FOR PROPHYLACTIC MEASURES, UNSPECIFIED: ICD-10-CM

## 2019-01-31 DIAGNOSIS — N20.0 CALCULUS OF KIDNEY: ICD-10-CM

## 2019-01-31 DIAGNOSIS — N20.0 CALCULUS OF KIDNEY: Chronic | ICD-10-CM

## 2019-01-31 DIAGNOSIS — Z98.89 OTHER SPECIFIED POSTPROCEDURAL STATES: Chronic | ICD-10-CM

## 2019-01-31 DIAGNOSIS — J45.909 UNSPECIFIED ASTHMA, UNCOMPLICATED: ICD-10-CM

## 2019-01-31 DIAGNOSIS — Z01.818 ENCOUNTER FOR OTHER PREPROCEDURAL EXAMINATION: ICD-10-CM

## 2019-01-31 LAB
ANION GAP SERPL CALC-SCNC: 15 MMOL/L — SIGNIFICANT CHANGE UP (ref 5–17)
APPEARANCE UR: CLEAR — SIGNIFICANT CHANGE UP
APTT BLD: 29.8 SEC — SIGNIFICANT CHANGE UP (ref 27.5–36.3)
BACTERIA # UR AUTO: ABNORMAL
BASOPHILS # BLD AUTO: 0.1 K/UL — SIGNIFICANT CHANGE UP (ref 0–0.2)
BASOPHILS NFR BLD AUTO: 0.5 % — SIGNIFICANT CHANGE UP (ref 0–2)
BILIRUB UR-MCNC: NEGATIVE — SIGNIFICANT CHANGE UP
BLD GP AB SCN SERPL QL: SIGNIFICANT CHANGE UP
BUN SERPL-MCNC: 13 MG/DL — SIGNIFICANT CHANGE UP (ref 8–20)
CALCIUM SERPL-MCNC: 9.2 MG/DL — SIGNIFICANT CHANGE UP (ref 8.6–10.2)
CHLORIDE SERPL-SCNC: 103 MMOL/L — SIGNIFICANT CHANGE UP (ref 98–107)
CO2 SERPL-SCNC: 23 MMOL/L — SIGNIFICANT CHANGE UP (ref 22–29)
COLOR SPEC: YELLOW — SIGNIFICANT CHANGE UP
CREAT SERPL-MCNC: 0.7 MG/DL — SIGNIFICANT CHANGE UP (ref 0.5–1.3)
DIFF PNL FLD: ABNORMAL
EOSINOPHIL # BLD AUTO: 0.2 K/UL — SIGNIFICANT CHANGE UP (ref 0–0.5)
EOSINOPHIL NFR BLD AUTO: 1.5 % — SIGNIFICANT CHANGE UP (ref 0–6)
EPI CELLS # UR: SIGNIFICANT CHANGE UP
GLUCOSE SERPL-MCNC: 86 MG/DL — SIGNIFICANT CHANGE UP (ref 70–115)
GLUCOSE UR QL: NEGATIVE MG/DL — SIGNIFICANT CHANGE UP
HCT VFR BLD CALC: 34.6 % — LOW (ref 37–47)
HGB BLD-MCNC: 10.6 G/DL — LOW (ref 12–16)
INR BLD: 1.03 RATIO — SIGNIFICANT CHANGE UP (ref 0.88–1.16)
KETONES UR-MCNC: NEGATIVE — SIGNIFICANT CHANGE UP
LEUKOCYTE ESTERASE UR-ACNC: ABNORMAL
LYMPHOCYTES # BLD AUTO: 1.6 K/UL — SIGNIFICANT CHANGE UP (ref 1–4.8)
LYMPHOCYTES # BLD AUTO: 13.9 % — LOW (ref 20–55)
MCHC RBC-ENTMCNC: 23.7 PG — LOW (ref 27–31)
MCHC RBC-ENTMCNC: 30.6 G/DL — LOW (ref 32–36)
MCV RBC AUTO: 77.4 FL — LOW (ref 81–99)
MONOCYTES # BLD AUTO: 0.6 K/UL — SIGNIFICANT CHANGE UP (ref 0–0.8)
MONOCYTES NFR BLD AUTO: 5.2 % — SIGNIFICANT CHANGE UP (ref 3–10)
NEUTROPHILS # BLD AUTO: 8.8 K/UL — HIGH (ref 1.8–8)
NEUTROPHILS NFR BLD AUTO: 78.7 % — HIGH (ref 37–73)
NITRITE UR-MCNC: NEGATIVE — SIGNIFICANT CHANGE UP
PH UR: 5 — SIGNIFICANT CHANGE UP (ref 5–8)
PLATELET # BLD AUTO: 565 K/UL — HIGH (ref 150–400)
POTASSIUM SERPL-MCNC: 3.8 MMOL/L — SIGNIFICANT CHANGE UP (ref 3.5–5.3)
POTASSIUM SERPL-SCNC: 3.8 MMOL/L — SIGNIFICANT CHANGE UP (ref 3.5–5.3)
PROT UR-MCNC: 30 MG/DL
PROTHROM AB SERPL-ACNC: 11.8 SEC — SIGNIFICANT CHANGE UP (ref 10–12.9)
RBC # BLD: 4.47 M/UL — SIGNIFICANT CHANGE UP (ref 4.4–5.2)
RBC # FLD: 17.3 % — HIGH (ref 11–15.6)
RBC CASTS # UR COMP ASSIST: ABNORMAL /HPF (ref 0–4)
SODIUM SERPL-SCNC: 141 MMOL/L — SIGNIFICANT CHANGE UP (ref 135–145)
SP GR SPEC: 1.02 — SIGNIFICANT CHANGE UP (ref 1.01–1.02)
TYPE + AB SCN PNL BLD: SIGNIFICANT CHANGE UP
UROBILINOGEN FLD QL: NEGATIVE MG/DL — SIGNIFICANT CHANGE UP
WBC # BLD: 11.2 K/UL — HIGH (ref 4.8–10.8)
WBC # FLD AUTO: 11.2 K/UL — HIGH (ref 4.8–10.8)
WBC UR QL: ABNORMAL

## 2019-01-31 PROCEDURE — 93010 ELECTROCARDIOGRAM REPORT: CPT

## 2019-01-31 NOTE — H&P PST ADULT - HISTORY OF PRESENT ILLNESS
39 y/o female seen today pre-op for right percutaneous nephrostomy tube placement and x-ray(interventional)cystolithalopaxy w/laser right percutaneous nephrolithotomy. Pt endorsed nephrolithiasis in 2016 and had x2 stent placed. Recently presented to the ED December 2018, for right flank pain, dx with calculus of kidney.

## 2019-01-31 NOTE — H&P PST ADULT - FAMILY HISTORY
Grandparent  Still living? No  Family history of type 2 diabetes mellitus, Age at diagnosis: Age Unknown     Mother  Still living? Yes, Estimated age: Age Unknown  Family history of renal cancer, Age at diagnosis: Age Unknown  Family history of breast cancer in female, Age at diagnosis: Age Unknown     Father  Still living? Yes, Estimated age: Age Unknown  Family history of type 2 diabetes mellitus, Age at diagnosis: Age Unknown

## 2019-01-31 NOTE — H&P PST ADULT - ASSESSMENT
37 y/o female seen today pre-op for right percutaneous nephrostomy tube placement and x-ray(interventional)cystolithalopaxy w/laser right percutaneous nephrolithotomy. Surgery protocol reviewed with pt today. Pt to follow-up with PCP for clearance     CAPRINI SCORE [CLOT]    AGE RELATED RISK FACTORS                                                       MOBILITY RELATED FACTORS  [ ] Age 41-60 years                                            (1 Point)                  [ ] Bed rest                                                        (1 Point)  [ ] Age: 61-74 years                                           (2 Points)                 [ ] Plaster cast                                                   (2 Points)  [ ] Age= 75 years                                              (3 Points)                 [ ] Bed bound for more than 72 hours                 (2 Points)    DISEASE RELATED RISK FACTORS                                               GENDER SPECIFIC FACTORS  [ ] Edema in the lower extremities                       (1 Point)                  [ ] Pregnancy                                                     (1 Point)  [ ] Varicose veins                                               (1 Point)                  [ ] Post-partum < 6 weeks                                   (1 Point)             [x ] BMI > 25 Kg/m2                                            (1 Point)                  [ ] Hormonal therapy  or oral contraception          (1 Point)                 [ ] Sepsis (in the previous month)                        (1 Point)                  [ ] History of pregnancy complications                 (1 point)  [ ] Pneumonia or serious lung disease                                               [ ] Unexplained or recurrent                     (1 Point)           (in the previous month)                               (1 Point)  [ ] Abnormal pulmonary function test                     (1 Point)                 SURGERY RELATED RISK FACTORS  [ ] Acute myocardial infarction                              (1 Point)                 [ ]  Section                                             (1 Point)  [ ] Congestive heart failure (in the previous month)  (1 Point)               [ ] Minor surgery                                                  (1 Point)   [ ] Inflammatory bowel disease                             (1 Point)                 [ ] Arthroscopic surgery                                        (2 Points)  [ ] Central venous access                                      (2 Points)                [x ] General surgery lasting more than 45 minutes   (2 Points)       [ ] Stroke (in the previous month)                          (5 Points)               [ ] Elective arthroplasty                                         (5 Points)                                                                                                                                               HEMATOLOGY RELATED FACTORS                                                 TRAUMA RELATED RISK FACTORS  [ ] Prior episodes of VTE                                     (3 Points)                [ ] Fracture of the hip, pelvis, or leg                       (5 Points)  [ ] Positive family history for VTE                         (3 Points)                 [ ] Acute spinal cord injury (in the previous month)  (5 Points)  [ ] Prothrombin 83893 A                                     (3 Points)                 [ ] Paralysis  (less than 1 month)                             (5 Points)  [ ] Factor V Leiden                                             (3 Points)                  [ ] Multiple Trauma within 1 month                        (5 Points)  [ ] Lupus anticoagulants                                     (3 Points)                                                           [ ] Anticardiolipin antibodies                               (3 Points)                                                       [ ] High homocysteine in the blood                      (3 Points)                                             [ ] Other congenital or acquired thrombophilia      (3 Points)                                                [ ] Heparin induced thrombocytopenia                  (3 Points)                                          Total Score [          ]

## 2019-01-31 NOTE — H&P PST ADULT - PROBLEM SELECTOR PLAN 1
Right percutaneous nephrostomy tube placement and x-ray(interventional)cystolithalopaxy w/laser right percutaneous nephrolithotomy.

## 2019-02-01 ENCOUNTER — APPOINTMENT (OUTPATIENT)
Dept: INTERNAL MEDICINE | Facility: CLINIC | Age: 39
End: 2019-02-01
Payer: COMMERCIAL

## 2019-02-01 VITALS
WEIGHT: 191 LBS | DIASTOLIC BLOOD PRESSURE: 70 MMHG | BODY MASS INDEX: 30.34 KG/M2 | HEIGHT: 66.5 IN | SYSTOLIC BLOOD PRESSURE: 130 MMHG

## 2019-02-01 LAB
CULTURE RESULTS: SIGNIFICANT CHANGE UP
SPECIMEN SOURCE: SIGNIFICANT CHANGE UP

## 2019-02-01 PROCEDURE — 99358 PROLONG SERVICE W/O CONTACT: CPT

## 2019-02-01 PROCEDURE — 99214 OFFICE O/P EST MOD 30 MIN: CPT | Mod: 25

## 2019-02-01 NOTE — HISTORY OF PRESENT ILLNESS
[No Pertinent Cardiac History] : no history of aortic stenosis, atrial fibrillation, coronary artery disease, recent myocardial infarction, or implantable device/pacemaker [Asthma] : asthma [COPD] : no COPD [Sleep Apnea] : no sleep apnea [Smoker] : not a smoker [No Adverse Anesthesia Reaction] : no adverse anesthesia reaction in self or family member [Chronic Anticoagulation] : no chronic anticoagulation [Chronic Kidney Disease] : no chronic kidney disease [Diabetes] : no diabetes [(Patient denies any chest pain, claudication, dyspnea on exertion, orthopnea, palpitations or syncope)] : Patient denies any chest pain, claudication, dyspnea on exertion, orthopnea, palpitations or syncope [Good (7-10 METs)] : Good (7-10 METs) [FreeTextEntry2] : 02/08/19 [FreeTextEntry4] : Ms. ARIADNE DESOUZA is a 38 year female comes to the office for preoperative evaluation for right percutaneous Nephrostomy tube placement and cystolitholapaxy with laser right percutaneous Nephrolithotomy. Patient feels well and has no complaints at this time.   [FreeTextEntry1] : medical clearance

## 2019-02-01 NOTE — PLAN
[FreeTextEntry1] : Ms. ARIADNE DESOUZA is a 38 year female comes to the office for preoperative evaluation for right percutaneous Nephrostomy tube placement and cystolitholapaxy with laser right percutaneous Nephrolithotomy. Patient feels well and has no complaints at this time.  Patient has greater than 4 METS, is a low perioperative risk for Major adverse cardiac event. ECG and blood work reviewed. No further testing indicated at this time. Patient is medically optimized for this procedure. \par \par Prior to patient's arrival, extensive medical records were reviewed including but not limited to, Hospital records records, out patient records, laboratory data and microbiology data \par In addition extensive time was also spent in reviewing diagnostic studies.\par \par The duration of the review took 35 minutes\par \par \par Counseling included abnormal lab results, differential diagnoses, treatment options, risks and benefits, lifestyle changes, prognosis, current condition, medications, and dose adjustments. \par The patient was interactive, attentive, asked questions, and verbalized understanding

## 2019-02-01 NOTE — ASSESSMENT
[Patient Optimized for Surgery] : Patient optimized for surgery [No Further Testing Recommended] : no further testing recommended [As per surgery] : as per surgery [FreeTextEntry4] : Ms. ARIADNE DESOUZA is a 38 year female comes to the office for preoperative evaluation for right percutaneous Nephrostomy tube placement and cystolitholapaxy with laser right percutaneous Nephrolithotomy. Patient feels well and has no complaints at this time.  Patient has greater than 4 METS, is a low perioperative risk for Major adverse cardiac event. ECG and blood work reviewed. No further testing indicated at this time. Patient is medically optimized for this procedure.

## 2019-02-07 ENCOUNTER — TRANSCRIPTION ENCOUNTER (OUTPATIENT)
Age: 39
End: 2019-02-07

## 2019-02-08 ENCOUNTER — APPOINTMENT (OUTPATIENT)
Dept: UROLOGY | Facility: HOSPITAL | Age: 39
End: 2019-02-08

## 2019-02-08 ENCOUNTER — RESULT REVIEW (OUTPATIENT)
Age: 39
End: 2019-02-08

## 2019-02-08 ENCOUNTER — INPATIENT (INPATIENT)
Facility: HOSPITAL | Age: 39
LOS: 1 days | Discharge: HOME | DRG: 661 | End: 2019-02-10
Attending: UROLOGY | Admitting: UROLOGY
Payer: COMMERCIAL

## 2019-02-08 VITALS
HEART RATE: 63 BPM | HEIGHT: 66.5 IN | WEIGHT: 197.98 LBS | OXYGEN SATURATION: 100 % | SYSTOLIC BLOOD PRESSURE: 138 MMHG | TEMPERATURE: 99 F | DIASTOLIC BLOOD PRESSURE: 89 MMHG | RESPIRATION RATE: 16 BRPM

## 2019-02-08 DIAGNOSIS — Z98.89 OTHER SPECIFIED POSTPROCEDURAL STATES: Chronic | ICD-10-CM

## 2019-02-08 DIAGNOSIS — N20.0 CALCULUS OF KIDNEY: ICD-10-CM

## 2019-02-08 DIAGNOSIS — N20.0 CALCULUS OF KIDNEY: Chronic | ICD-10-CM

## 2019-02-08 DIAGNOSIS — T83.192A OTHER MECHANICAL COMPLICATION OF INDWELLING URETERAL STENT, INITIAL ENCOUNTER: ICD-10-CM

## 2019-02-08 DIAGNOSIS — Z01.818 ENCOUNTER FOR OTHER PREPROCEDURAL EXAMINATION: ICD-10-CM

## 2019-02-08 PROCEDURE — 86901 BLOOD TYPING SEROLOGIC RH(D): CPT

## 2019-02-08 PROCEDURE — 76000 FLUOROSCOPY <1 HR PHYS/QHP: CPT

## 2019-02-08 PROCEDURE — 93005 ELECTROCARDIOGRAM TRACING: CPT

## 2019-02-08 PROCEDURE — 86900 BLOOD TYPING SEROLOGIC ABO: CPT

## 2019-02-08 PROCEDURE — 85730 THROMBOPLASTIN TIME PARTIAL: CPT

## 2019-02-08 PROCEDURE — 85610 PROTHROMBIN TIME: CPT

## 2019-02-08 PROCEDURE — G0463: CPT

## 2019-02-08 PROCEDURE — 85027 COMPLETE CBC AUTOMATED: CPT

## 2019-02-08 PROCEDURE — 36415 COLL VENOUS BLD VENIPUNCTURE: CPT

## 2019-02-08 PROCEDURE — 80048 BASIC METABOLIC PNL TOTAL CA: CPT

## 2019-02-08 PROCEDURE — 87086 URINE CULTURE/COLONY COUNT: CPT

## 2019-02-08 PROCEDURE — 81001 URINALYSIS AUTO W/SCOPE: CPT

## 2019-02-08 PROCEDURE — 86850 RBC ANTIBODY SCREEN: CPT

## 2019-02-08 PROCEDURE — 88300 SURGICAL PATH GROSS: CPT | Mod: 26

## 2019-02-08 RX ORDER — ONDANSETRON 8 MG/1
4 TABLET, FILM COATED ORAL ONCE
Qty: 0 | Refills: 0 | Status: DISCONTINUED | OUTPATIENT
Start: 2019-02-08 | End: 2019-02-08

## 2019-02-08 RX ORDER — DEXTROSE MONOHYDRATE, SODIUM CHLORIDE, AND POTASSIUM CHLORIDE 50; .745; 4.5 G/1000ML; G/1000ML; G/1000ML
1000 INJECTION, SOLUTION INTRAVENOUS
Qty: 0 | Refills: 0 | Status: DISCONTINUED | OUTPATIENT
Start: 2019-02-08 | End: 2019-02-09

## 2019-02-08 RX ORDER — ONDANSETRON 8 MG/1
4 TABLET, FILM COATED ORAL EVERY 6 HOURS
Qty: 0 | Refills: 0 | Status: DISCONTINUED | OUTPATIENT
Start: 2019-02-08 | End: 2019-02-10

## 2019-02-08 RX ORDER — SODIUM CHLORIDE 9 MG/ML
1000 INJECTION, SOLUTION INTRAVENOUS
Qty: 0 | Refills: 0 | Status: DISCONTINUED | OUTPATIENT
Start: 2019-02-08 | End: 2019-02-08

## 2019-02-08 RX ORDER — OXYCODONE AND ACETAMINOPHEN 5; 325 MG/1; MG/1
1 TABLET ORAL EVERY 4 HOURS
Qty: 0 | Refills: 0 | Status: DISCONTINUED | OUTPATIENT
Start: 2019-02-08 | End: 2019-02-09

## 2019-02-08 RX ORDER — ACETAMINOPHEN 500 MG
1000 TABLET ORAL ONCE
Qty: 0 | Refills: 0 | Status: COMPLETED | OUTPATIENT
Start: 2019-02-08 | End: 2019-02-08

## 2019-02-08 RX ORDER — FENTANYL CITRATE 50 UG/ML
25 INJECTION INTRAVENOUS
Qty: 0 | Refills: 0 | Status: DISCONTINUED | OUTPATIENT
Start: 2019-02-08 | End: 2019-02-08

## 2019-02-08 RX ORDER — HYDROMORPHONE HYDROCHLORIDE 2 MG/ML
0.5 INJECTION INTRAMUSCULAR; INTRAVENOUS; SUBCUTANEOUS
Qty: 0 | Refills: 0 | Status: DISCONTINUED | OUTPATIENT
Start: 2019-02-08 | End: 2019-02-08

## 2019-02-08 RX ORDER — KETOROLAC TROMETHAMINE 30 MG/ML
30 SYRINGE (ML) INJECTION EVERY 8 HOURS
Qty: 0 | Refills: 0 | Status: DISCONTINUED | OUTPATIENT
Start: 2019-02-08 | End: 2019-02-10

## 2019-02-08 RX ORDER — INFLUENZA VIRUS VACCINE 15; 15; 15; 15 UG/.5ML; UG/.5ML; UG/.5ML; UG/.5ML
0.5 SUSPENSION INTRAMUSCULAR ONCE
Qty: 0 | Refills: 0 | Status: COMPLETED | OUTPATIENT
Start: 2019-02-08 | End: 2019-02-08

## 2019-02-08 RX ADMIN — FENTANYL CITRATE 25 MICROGRAM(S): 50 INJECTION INTRAVENOUS at 17:46

## 2019-02-08 RX ADMIN — Medication 1 TABLET(S): at 22:35

## 2019-02-08 RX ADMIN — FENTANYL CITRATE 25 MICROGRAM(S): 50 INJECTION INTRAVENOUS at 18:10

## 2019-02-08 RX ADMIN — FENTANYL CITRATE 25 MICROGRAM(S): 50 INJECTION INTRAVENOUS at 18:00

## 2019-02-08 RX ADMIN — FENTANYL CITRATE 25 MICROGRAM(S): 50 INJECTION INTRAVENOUS at 17:51

## 2019-02-08 RX ADMIN — DEXTROSE MONOHYDRATE, SODIUM CHLORIDE, AND POTASSIUM CHLORIDE 100 MILLILITER(S): 50; .745; 4.5 INJECTION, SOLUTION INTRAVENOUS at 22:35

## 2019-02-08 RX ADMIN — Medication 400 MILLIGRAM(S): at 12:45

## 2019-02-08 RX ADMIN — Medication 1000 MILLIGRAM(S): at 13:00

## 2019-02-08 RX ADMIN — Medication 100 MILLIGRAM(S): at 13:14

## 2019-02-08 RX ADMIN — OXYCODONE AND ACETAMINOPHEN 1 TABLET(S): 5; 325 TABLET ORAL at 23:04

## 2019-02-08 RX ADMIN — OXYCODONE AND ACETAMINOPHEN 1 TABLET(S): 5; 325 TABLET ORAL at 22:34

## 2019-02-08 NOTE — BRIEF OPERATIVE NOTE - OPERATION/FINDINGS
Likely UPJ obstruction, unable to assess primary vs. secondary; encrusted distal end of ureteral stent

## 2019-02-08 NOTE — BRIEF OPERATIVE NOTE - PROCEDURE
<<-----Click on this checkbox to enter Procedure Nephrostogram  02/08/2019    Active  [email or web address]  Nephrolithotomy, percutaneous  02/08/2019    Active  [email or web address]  Cystoscopy with removal of right ureteral stent  02/08/2019    Active  [email or web address]  Cystolithalopaxy, stone size less than 2.5cm  02/08/2019    Active  [email or web address]

## 2019-02-08 NOTE — BRIEF OPERATIVE NOTE - POST-OP DX
Bladder stones  02/08/2019    Active  Milind Loo  Hydronephrosis, unspecified hydronephrosis type  02/08/2019    Active  Milind Loo  Kidney stones  02/08/2019    Active  Milind Loo

## 2019-02-08 NOTE — BRIEF OPERATIVE NOTE - PRE-OP DX
Bladder stone  02/08/2019    Active  Milind Loo  Hydronephrosis, unspecified hydronephrosis type  02/08/2019    Active  Milind Loo  Kidney stones  02/08/2019    Active  Milind Loo

## 2019-02-08 NOTE — PROGRESS NOTE ADULT - PROBLEM SELECTOR PLAN 1
s/p Nephrostogram  02/08/2019    Active  [email or web address]  Nephrolithotomy, percutaneous  02/08/2019    Active  [email or web address]  Cystoscopy with removal of right ureteral stent  02/08/2019    Active  [email or web address]  Cystolithalopaxy, stone size less than 2.5cm    - continue IV pain meds s/p Nephrostogram  02/08/2019    Active  [email or web address]  Nephrolithotomy, percutaneous  02/08/2019    Active  [email or web address]  Cystoscopy with removal of right ureteral stent  02/08/2019    Active  [email or web address]  Cystolithalopaxy, stone size less than 2.5cm    - continue IV pain meds  - plan to dc viera at 6am   - possibly clamp NT if urine clear s/p Nephrostogram  02/08/2019    Active  [email or web address]  Nephrolithotomy, percutaneous  02/08/2019    Active  [email or web address]  Cystoscopy with removal of right ureteral stent  02/08/2019    Active  [email or web address]  Cystolithalopaxy, stone size less than 2.5cm    - continue IV pain meds  - plan to dc viera at 6am

## 2019-02-09 ENCOUNTER — TRANSCRIPTION ENCOUNTER (OUTPATIENT)
Age: 39
End: 2019-02-09

## 2019-02-09 PROCEDURE — 99024 POSTOP FOLLOW-UP VISIT: CPT

## 2019-02-09 RX ORDER — OXYCODONE HYDROCHLORIDE 5 MG/1
1 TABLET ORAL
Qty: 12 | Refills: 0 | OUTPATIENT
Start: 2019-02-09 | End: 2019-02-11

## 2019-02-09 RX ORDER — OXYCODONE HYDROCHLORIDE 5 MG/1
10 TABLET ORAL EVERY 4 HOURS
Qty: 0 | Refills: 0 | Status: DISCONTINUED | OUTPATIENT
Start: 2019-02-09 | End: 2019-02-10

## 2019-02-09 RX ORDER — OXYCODONE HYDROCHLORIDE 5 MG/1
5 TABLET ORAL EVERY 4 HOURS
Qty: 0 | Refills: 0 | Status: DISCONTINUED | OUTPATIENT
Start: 2019-02-09 | End: 2019-02-10

## 2019-02-09 RX ORDER — ACETAMINOPHEN 500 MG
1000 TABLET ORAL ONCE
Qty: 0 | Refills: 0 | Status: COMPLETED | OUTPATIENT
Start: 2019-02-09 | End: 2019-02-09

## 2019-02-09 RX ORDER — ACETAMINOPHEN 500 MG
650 TABLET ORAL EVERY 6 HOURS
Qty: 0 | Refills: 0 | Status: DISCONTINUED | OUTPATIENT
Start: 2019-02-09 | End: 2019-02-10

## 2019-02-09 RX ORDER — ENOXAPARIN SODIUM 100 MG/ML
40 INJECTION SUBCUTANEOUS DAILY
Qty: 0 | Refills: 0 | Status: DISCONTINUED | OUTPATIENT
Start: 2019-02-09 | End: 2019-02-10

## 2019-02-09 RX ADMIN — ENOXAPARIN SODIUM 40 MILLIGRAM(S): 100 INJECTION SUBCUTANEOUS at 18:00

## 2019-02-09 RX ADMIN — OXYCODONE HYDROCHLORIDE 10 MILLIGRAM(S): 5 TABLET ORAL at 22:14

## 2019-02-09 RX ADMIN — Medication 650 MILLIGRAM(S): at 15:30

## 2019-02-09 RX ADMIN — Medication 30 MILLIGRAM(S): at 00:03

## 2019-02-09 RX ADMIN — OXYCODONE HYDROCHLORIDE 10 MILLIGRAM(S): 5 TABLET ORAL at 21:31

## 2019-02-09 RX ADMIN — Medication 650 MILLIGRAM(S): at 17:59

## 2019-02-09 RX ADMIN — OXYCODONE HYDROCHLORIDE 10 MILLIGRAM(S): 5 TABLET ORAL at 14:05

## 2019-02-09 RX ADMIN — Medication 1 TABLET(S): at 18:00

## 2019-02-09 RX ADMIN — Medication 30 MILLIGRAM(S): at 00:30

## 2019-02-09 RX ADMIN — Medication 400 MILLIGRAM(S): at 22:15

## 2019-02-09 RX ADMIN — Medication 650 MILLIGRAM(S): at 18:59

## 2019-02-09 RX ADMIN — Medication 1000 MILLIGRAM(S): at 23:18

## 2019-02-09 RX ADMIN — Medication 30 MILLIGRAM(S): at 18:57

## 2019-02-09 RX ADMIN — Medication 650 MILLIGRAM(S): at 14:04

## 2019-02-09 RX ADMIN — Medication 30 MILLIGRAM(S): at 08:05

## 2019-02-09 RX ADMIN — Medication 30 MILLIGRAM(S): at 17:59

## 2019-02-09 RX ADMIN — OXYCODONE AND ACETAMINOPHEN 1 TABLET(S): 5; 325 TABLET ORAL at 05:05

## 2019-02-09 RX ADMIN — Medication 1 TABLET(S): at 05:05

## 2019-02-09 RX ADMIN — OXYCODONE HYDROCHLORIDE 10 MILLIGRAM(S): 5 TABLET ORAL at 15:00

## 2019-02-09 RX ADMIN — Medication 30 MILLIGRAM(S): at 09:00

## 2019-02-09 RX ADMIN — OXYCODONE AND ACETAMINOPHEN 1 TABLET(S): 5; 325 TABLET ORAL at 05:53

## 2019-02-09 RX ADMIN — ONDANSETRON 4 MILLIGRAM(S): 8 TABLET, FILM COATED ORAL at 14:04

## 2019-02-09 NOTE — DISCHARGE NOTE ADULT - PLAN OF CARE
Alleviation of pain and symptoms Follow up: Please call and make an appointment with Dr. Loo 7-10 days after discharge - you should get a NEPHROSTOGRAM prior to your follow-up appointment at an outside radiology office. Also, please call and make an appointment with your primary care physician as per your usual schedule.     Activity: May return to normal activities as tolerated, however refrain from heavy lifting > 10-15 lbs.    Diet: May continue regular diet.    Medications: Please take all home medications as prescribed by your primary care doctor. Pain medication has been prescribed for you. Please, take it as it has been prescribed, do not drive or operate heavy machinery while taking narcotics.  You are encouraged to take over-the-counter tylenol and/or ibuprofen for pain relief when you feel your pain no longer warrants the use of narcotic pain medications, however DO NOT TAKE percocet and tylenol at the same time as they contain the same active ingredient (acetaminophen). Take only percocet OR tylenol. Please also continue oral antibiotics as prescribed.    Wound Care: Please, keep wound site clean and dry. You may shower, but do not bathe. Your nephrostomy tube should remain in place until you are seen at your follow-up appointment.  Empty your leg bag as needed, and record outputs.     Patient is advised to RETURN TO THE EMERGENCY DEPARTMENT for any of the following - worsening pain, fever/chills, nausea/vomiting, altered mental status, chest pain, shortness of breath, or any other new / worsening symptom.

## 2019-02-09 NOTE — DISCHARGE NOTE ADULT - HOSPITAL COURSE
39 y/o female was taken to the OR On 2/8 for a nephrostogram, percutaneous nephrolithotomy, cystoscopy with removal of right ureteral stent, and cystolithalopaxy. Patient tolerated procedure well and was transferred to PACU and then floors in stable condition. Post-op viera removed and pt voiding. Pain controlled, OOB ambulating, tolerating diet, and stable for d/c home with outpatient follow-up as outlined above.    Patient is advised to RETURN TO THE EMERGENCY DEPARTMENT for any of the following - worsening pain, fever/chills, nausea/vomiting, altered mental status, chest pain, shortness of breath, or any other new / worsening symptom.

## 2019-02-09 NOTE — DISCHARGE NOTE ADULT - CARE PLAN
Principal Discharge DX:	Renal calculus  Goal:	Alleviation of pain and symptoms  Assessment and plan of treatment:	Follow up: Please call and make an appointment with Dr. Loo 7-10 days after discharge - you should get a NEPHROSTOGRAM prior to your follow-up appointment at an outside radiology office. Also, please call and make an appointment with your primary care physician as per your usual schedule.     Activity: May return to normal activities as tolerated, however refrain from heavy lifting > 10-15 lbs.    Diet: May continue regular diet.    Medications: Please take all home medications as prescribed by your primary care doctor. Pain medication has been prescribed for you. Please, take it as it has been prescribed, do not drive or operate heavy machinery while taking narcotics.  You are encouraged to take over-the-counter tylenol and/or ibuprofen for pain relief when you feel your pain no longer warrants the use of narcotic pain medications, however DO NOT TAKE percocet and tylenol at the same time as they contain the same active ingredient (acetaminophen). Take only percocet OR tylenol. Please also continue oral antibiotics as prescribed.    Wound Care: Please, keep wound site clean and dry. You may shower, but do not bathe. Your nephrostomy tube should remain in place until you are seen at your follow-up appointment.  Empty your leg bag as needed, and record outputs.     Patient is advised to RETURN TO THE EMERGENCY DEPARTMENT for any of the following - worsening pain, fever/chills, nausea/vomiting, altered mental status, chest pain, shortness of breath, or any other new / worsening symptom.

## 2019-02-09 NOTE — DISCHARGE NOTE ADULT - PATIENT PORTAL LINK FT
You can access the [facility] Portal, offered by [facility], by registering with the following website: [email or web address]

## 2019-02-09 NOTE — DISCHARGE NOTE ADULT - MEDICATION SUMMARY - MEDICATIONS TO TAKE
I will START or STAY ON the medications listed below when I get home from the hospital:    oxyCODONE 5 mg oral tablet  -- 1 tab(s) by mouth every 6 hours, As Needed -for severe pain MDD:4 tabs  -- Caution federal law prohibits the transfer of this drug to any person other  than the person for whom it was prescribed.  It is very important that you take or use this exactly as directed.  Do not skip doses or discontinue unless directed by your doctor.  May cause drowsiness.  Alcohol may intensify this effect.  Use care when operating dangerous machinery.  This prescription cannot be refilled.  Using more of this medication than prescribed may cause serious breathing problems.    -- Indication: For Calculus of kidney    albuterol 90 mcg/inh inhalation aerosol  -- Indication: For home med    sulfamethoxazole-trimethoprim 800 mg-160 mg oral tablet  -- 1 tab(s) by mouth 2 times a day MDD:2  -- Indication: For Calculus of kidney    multivitamin  -- Indication: For home

## 2019-02-09 NOTE — DISCHARGE NOTE ADULT - CARE PROVIDER_API CALL
Milind Loo)  Urology  [address], [address]  [place] [address]  Phone: [phone]  Fax: [phone]  Follow Up Time:

## 2019-02-10 VITALS — HEART RATE: 85 BPM | SYSTOLIC BLOOD PRESSURE: 132 MMHG | TEMPERATURE: 98 F | DIASTOLIC BLOOD PRESSURE: 84 MMHG

## 2019-02-10 PROCEDURE — C1894: CPT

## 2019-02-10 PROCEDURE — C1729: CPT

## 2019-02-10 PROCEDURE — C1758: CPT

## 2019-02-10 PROCEDURE — 76942 ECHO GUIDE FOR BIOPSY: CPT

## 2019-02-10 PROCEDURE — C1889: CPT

## 2019-02-10 PROCEDURE — C1776: CPT

## 2019-02-10 PROCEDURE — 82365 CALCULUS SPECTROSCOPY: CPT

## 2019-02-10 PROCEDURE — 88300 SURGICAL PATH GROSS: CPT

## 2019-02-10 PROCEDURE — C1769: CPT

## 2019-02-10 RX ADMIN — OXYCODONE HYDROCHLORIDE 10 MILLIGRAM(S): 5 TABLET ORAL at 08:57

## 2019-02-10 RX ADMIN — Medication 1 TABLET(S): at 05:27

## 2019-02-10 RX ADMIN — Medication 650 MILLIGRAM(S): at 11:45

## 2019-02-10 RX ADMIN — Medication 30 MILLIGRAM(S): at 05:27

## 2019-02-10 RX ADMIN — Medication 30 MILLIGRAM(S): at 06:15

## 2019-02-10 RX ADMIN — Medication 650 MILLIGRAM(S): at 12:45

## 2019-02-10 RX ADMIN — OXYCODONE HYDROCHLORIDE 5 MILLIGRAM(S): 5 TABLET ORAL at 05:25

## 2019-02-10 RX ADMIN — OXYCODONE HYDROCHLORIDE 5 MILLIGRAM(S): 5 TABLET ORAL at 04:22

## 2019-02-10 RX ADMIN — OXYCODONE HYDROCHLORIDE 10 MILLIGRAM(S): 5 TABLET ORAL at 09:45

## 2019-02-10 NOTE — PROGRESS NOTE ADULT - SUBJECTIVE AND OBJECTIVE BOX
ARIADNE BENNETT  [id number]  38y, Female    Encounter for other preprocedural examination  Calculus of kidney      Patient is a 38y old  Female who presents with a chief complaint of right PCNL (2019 12:16)      HPI:  Had bladder stone removed and stent out yesterday. also had perc neph. no nausea or vomiting. no fevers or chills. Estes out a few hours ago. no void yet. Mild blood in neph tube. moderate amount of discomfort.      Allergies    doxycycline (Hives)  Levaquin (Hives)  penicillin (Hives)    Intolerances        MEDICATIONS  (STANDING):  acetaminophen   Tablet .. 650 milliGRAM(s) Oral every 6 hours  influenza   Vaccine 0.5 milliLiter(s) IntraMuscular once  sodium chloride 0.9% with potassium chloride 20 mEq/L 1000 milliLiter(s) (100 mL/Hr) IV Continuous <Continuous>  trimethoprim  160 mG/sulfamethoxazole 800 mG 1 Tablet(s) Oral two times a day    MEDICATIONS  (PRN):  ketorolac   Injectable 30 milliGRAM(s) IV Push every 8 hours PRN pain  ondansetron Injectable 4 milliGRAM(s) IV Push every 6 hours PRN N/V  oxyCODONE    IR 5 milliGRAM(s) Oral every 4 hours PRN Moderate Pain (4 - 6)  oxyCODONE    IR 10 milliGRAM(s) Oral every 4 hours PRN Severe Pain (7 - 10)      PAST MEDICAL & SURGICAL HISTORY:  Cervical incompetence  Renal calculus  Hypothyroidism  Cholelithiasis  Asthma  Kidney stone on right side: Oct 2016  H/O lithotripsy  S/P nasal septoplasty: in   History of Cholecystectomy: in   H/O:  Section x 1:  and       I&O's Detail    2019 07:01  -  2019 07:00  --------------------------------------------------------  IN:    lactated ringers.: 375 mL    Oral Fluid: 200 mL    sodium chloride 0.9% with potassium chloride 20 mEq/L: 1100 mL  Total IN: 1675 mL    OUT:    Drain: 1750 mL    Indwelling Catheter - Urethral: 1490 mL  Total OUT: 3240 mL    Total NET: -1565 mL          PE:    Vital Signs Last 24 Hrs  T(C): 37.1 (2019 08:10), Max: 37.3 (2019 10:11)  T(F): 98.8 (2019 08:10), Max: 99.2 (2019 22:22)  HR: 60 (2019 08:10) (54 - 100)  BP: 119/72 (2019 08:10) (88/60 - 138/89)  BP(mean): --  RR: 18 (2019 08:10) (14 - 18)  SpO2: 97% (2019 05:17) (97% - 100%)    Daily Height in cm: 168.91 (2019 10:11)    Daily     PHYSICAL EXAM:      Constitutional: mild to moderate discomfort    Respiratory: no respiratory distress    Neurological: no tremors    Skin: no jaundice    Psychiatric: alert and oriented X 3        Labs:                    Impression:    kidney stone  bladder stone on encrusted stent  UPJ obstruction, primary vs. secondary      Plan:  Estes out today  OOB abulating  possible discharge today depending on pain.  Home with neph tube when discharged    Milind Loo MD  19 @ 10:10
Looks good  No complaints    Follow up nephrostogram in one week and with me in the office a day later.  see discharge note
Post op check.   POD#0 s/p Nephrostogram, Nephrolithotomy, percutaneous, Cystoscopy with removal of right ureteral stent, Cystolithalopaxy, stone size less than 2.5cm .   Pt with adequate pain relief, tolerating PO , denies nausea.     VSS, NAD, A&Ox3  CVS: S1, S2   Chest: BS BL   Abd: moderately tender over LLQ  Back: nephrostomy in place draining bloody urine to bedside  : viera in place draining yellow urine   Ext: SCD in place
Vascular & Interventional Radiology Post-Procedure Note    Pre-Procedure Diagnosis: right renal stones and obstructed double j stent  Post-Procedure Diagnosis: Same as pre.  Indications for Procedure: ____    : michael  Assistant(s): ____    Procedure Details/Findings: 8 F right nephrostomy placed.  unable to pass stent into ureter due to high grade obstruction.  stones are seen in the calyces     Access (if applicable): rt flank    Complications: 0  Estimated Blood Loss: Minimal  Anethesia: 1% Lidocane SQ  Specimen: 0  Contrast: 10  Sedation: mod sedation   Patient Condition/Disposition: Stable    Plan:     OR with dr alfaro    Discussed findings  with dr alfaro
Vascular & Interventional Radiology Pre-Procedure Note    Procedure Name: right nephrostomy prior to PCNL    HPI: 38y Female with right renal stone     Allergies: doxycycline (Hives)  Levaquin (Hives)  penicillin (Hives)    Medications (Abx/Cardiac/Anticoagulation/Blood Products)      Data:  168.91  89.8  T(C): 37.3  HR: 63  BP: 138/89  RR: 16  SpO2: 100%                    Imaging: CT shows right renal stone and hydro    Plan:   -38y Female presents for right nephrostomy prior to PCNL in OR by Dr Loo  -Risks/Benefits/alternatives explained with the patient and/or healthcare proxy and witnessed informed consent obtained.

## 2019-02-11 ENCOUNTER — MESSAGE (OUTPATIENT)
Age: 39
End: 2019-02-11

## 2019-02-14 ENCOUNTER — OTHER (OUTPATIENT)
Age: 39
End: 2019-02-14

## 2019-02-15 LAB
COMPN STONE: SIGNIFICANT CHANGE UP
COMPN STONE: SIGNIFICANT CHANGE UP

## 2019-02-19 ENCOUNTER — MEDICATION RENEWAL (OUTPATIENT)
Age: 39
End: 2019-02-19

## 2019-02-19 ENCOUNTER — APPOINTMENT (OUTPATIENT)
Dept: UROLOGY | Facility: CLINIC | Age: 39
End: 2019-02-19

## 2019-02-21 ENCOUNTER — APPOINTMENT (OUTPATIENT)
Dept: UROLOGY | Facility: CLINIC | Age: 39
End: 2019-02-21
Payer: COMMERCIAL

## 2019-02-21 ENCOUNTER — INBOUND DOCUMENT (OUTPATIENT)
Age: 39
End: 2019-02-21

## 2019-02-21 ENCOUNTER — APPOINTMENT (OUTPATIENT)
Dept: INTERNAL MEDICINE | Facility: CLINIC | Age: 39
End: 2019-02-21

## 2019-02-21 VITALS
HEIGHT: 66.5 IN | WEIGHT: 190 LBS | DIASTOLIC BLOOD PRESSURE: 94 MMHG | BODY MASS INDEX: 30.17 KG/M2 | SYSTOLIC BLOOD PRESSURE: 140 MMHG | HEART RATE: 75 BPM | RESPIRATION RATE: 15 BRPM

## 2019-02-21 LAB — SURGICAL PATHOLOGY STUDY: SIGNIFICANT CHANGE UP

## 2019-02-21 PROCEDURE — 99024 POSTOP FOLLOW-UP VISIT: CPT

## 2019-02-21 RX ORDER — OXYBUTYNIN CHLORIDE 5 MG/1
5 TABLET ORAL
Qty: 90 | Refills: 1 | Status: DISCONTINUED | COMMUNITY
Start: 2019-01-17 | End: 2019-02-21

## 2019-02-21 RX ORDER — ESCITALOPRAM OXALATE 10 MG/1
10 TABLET ORAL DAILY
Qty: 30 | Refills: 0 | Status: DISCONTINUED | COMMUNITY
Start: 2019-01-17 | End: 2019-02-21

## 2019-02-21 RX ORDER — TAMSULOSIN HYDROCHLORIDE 0.4 MG/1
0.4 CAPSULE ORAL
Qty: 30 | Refills: 0 | Status: DISCONTINUED | COMMUNITY
Start: 2019-01-17 | End: 2019-02-21

## 2019-02-21 NOTE — ASSESSMENT
[FreeTextEntry1] : UPJ obstruction\par \par Plan:\par \par nephrostogram\par lasix renogram\par schedule cysto and right retrograde\par \par Discussed with Dr. Campos about reconstruction.

## 2019-02-21 NOTE — HISTORY OF PRESENT ILLNESS
[FreeTextEntry1] : patient has been doing better. no flank pain or hematuria. no urgency. no feeling of fullness.

## 2019-02-21 NOTE — LETTER BODY
[Dear  ___] : Dear  [unfilled], [Courtesy Letter:] : I had the pleasure of seeing your patient, [unfilled], in my office today. [Please see my note below.] : Please see my note below. [Sincerely,] : Sincerely, [FreeTextEntry3] : Ed\par \par Milind Loo MD\par [facility]\par  of Urology\par [employer] [facility]\par

## 2019-02-22 ENCOUNTER — OUTPATIENT (OUTPATIENT)
Dept: OUTPATIENT SERVICES | Facility: HOSPITAL | Age: 39
LOS: 1 days | End: 2019-02-22
Payer: COMMERCIAL

## 2019-02-22 ENCOUNTER — APPOINTMENT (OUTPATIENT)
Dept: UROLOGY | Facility: CLINIC | Age: 39
End: 2019-02-22

## 2019-02-22 DIAGNOSIS — N20.0 CALCULUS OF KIDNEY: Chronic | ICD-10-CM

## 2019-02-22 DIAGNOSIS — N13.5 CROSSING VESSEL AND STRICTURE OF URETER WITHOUT HYDRONEPHROSIS: ICD-10-CM

## 2019-02-22 DIAGNOSIS — Z98.89 OTHER SPECIFIED POSTPROCEDURAL STATES: Chronic | ICD-10-CM

## 2019-02-22 DIAGNOSIS — N20.0 CALCULUS OF KIDNEY: ICD-10-CM

## 2019-02-22 LAB — HCG UR QL: NEGATIVE — SIGNIFICANT CHANGE UP

## 2019-02-22 PROCEDURE — 77001 FLUOROGUIDE FOR VEIN DEVICE: CPT

## 2019-02-22 PROCEDURE — 50431 NJX PX NFROSGRM &/URTRGRM: CPT | Mod: RT

## 2019-02-22 PROCEDURE — 52005 CYSTO W/URTRL CATHJ: CPT

## 2019-02-22 PROCEDURE — 81025 URINE PREGNANCY TEST: CPT

## 2019-02-27 ENCOUNTER — EMERGENCY (EMERGENCY)
Facility: HOSPITAL | Age: 39
LOS: 1 days | Discharge: HOME | End: 2019-02-27
Attending: EMERGENCY MEDICINE
Payer: COMMERCIAL

## 2019-02-27 VITALS
OXYGEN SATURATION: 99 % | TEMPERATURE: 98 F | DIASTOLIC BLOOD PRESSURE: 71 MMHG | RESPIRATION RATE: 18 BRPM | SYSTOLIC BLOOD PRESSURE: 123 MMHG | HEART RATE: 72 BPM

## 2019-02-27 VITALS — HEIGHT: 66 IN | WEIGHT: 190.92 LBS

## 2019-02-27 DIAGNOSIS — Z98.89 OTHER SPECIFIED POSTPROCEDURAL STATES: Chronic | ICD-10-CM

## 2019-02-27 DIAGNOSIS — N20.0 CALCULUS OF KIDNEY: Chronic | ICD-10-CM

## 2019-02-27 LAB
ALBUMIN SERPL ELPH-MCNC: 4.3 G/DL — SIGNIFICANT CHANGE UP (ref 3.3–5.2)
ALP SERPL-CCNC: 79 U/L — SIGNIFICANT CHANGE UP (ref 40–120)
ALT FLD-CCNC: 11 U/L — SIGNIFICANT CHANGE UP
ANION GAP SERPL CALC-SCNC: 11 MMOL/L — SIGNIFICANT CHANGE UP (ref 5–17)
APPEARANCE UR: CLEAR — SIGNIFICANT CHANGE UP
AST SERPL-CCNC: 13 U/L — SIGNIFICANT CHANGE UP
BACTERIA # UR AUTO: ABNORMAL
BASOPHILS # BLD AUTO: 0 K/UL — SIGNIFICANT CHANGE UP (ref 0–0.2)
BASOPHILS NFR BLD AUTO: 0.4 % — SIGNIFICANT CHANGE UP (ref 0–2)
BILIRUB SERPL-MCNC: 0.6 MG/DL — SIGNIFICANT CHANGE UP (ref 0.4–2)
BILIRUB UR-MCNC: NEGATIVE — SIGNIFICANT CHANGE UP
BUN SERPL-MCNC: 14 MG/DL — SIGNIFICANT CHANGE UP (ref 8–20)
CALCIUM SERPL-MCNC: 9.4 MG/DL — SIGNIFICANT CHANGE UP (ref 8.6–10.2)
CHLORIDE SERPL-SCNC: 100 MMOL/L — SIGNIFICANT CHANGE UP (ref 98–107)
CO2 SERPL-SCNC: 25 MMOL/L — SIGNIFICANT CHANGE UP (ref 22–29)
COLOR SPEC: YELLOW — SIGNIFICANT CHANGE UP
CREAT SERPL-MCNC: 0.67 MG/DL — SIGNIFICANT CHANGE UP (ref 0.5–1.3)
DIFF PNL FLD: ABNORMAL
EOSINOPHIL # BLD AUTO: 0.1 K/UL — SIGNIFICANT CHANGE UP (ref 0–0.5)
EOSINOPHIL NFR BLD AUTO: 1.1 % — SIGNIFICANT CHANGE UP (ref 0–6)
EPI CELLS # UR: SIGNIFICANT CHANGE UP
GLUCOSE SERPL-MCNC: 92 MG/DL — SIGNIFICANT CHANGE UP (ref 70–115)
GLUCOSE UR QL: NEGATIVE MG/DL — SIGNIFICANT CHANGE UP
HCT VFR BLD CALC: 34 % — LOW (ref 37–47)
HGB BLD-MCNC: 10.7 G/DL — LOW (ref 12–16)
KETONES UR-MCNC: NEGATIVE — SIGNIFICANT CHANGE UP
LACTATE BLDV-MCNC: 1.3 MMOL/L — SIGNIFICANT CHANGE UP (ref 0.5–2)
LEUKOCYTE ESTERASE UR-ACNC: ABNORMAL
LYMPHOCYTES # BLD AUTO: 1.4 K/UL — SIGNIFICANT CHANGE UP (ref 1–4.8)
LYMPHOCYTES # BLD AUTO: 14.6 % — LOW (ref 20–55)
MCHC RBC-ENTMCNC: 24.3 PG — LOW (ref 27–31)
MCHC RBC-ENTMCNC: 31.5 G/DL — LOW (ref 32–36)
MCV RBC AUTO: 77.3 FL — LOW (ref 81–99)
MONOCYTES # BLD AUTO: 0.5 K/UL — SIGNIFICANT CHANGE UP (ref 0–0.8)
MONOCYTES NFR BLD AUTO: 5.1 % — SIGNIFICANT CHANGE UP (ref 3–10)
NEUTROPHILS # BLD AUTO: 7.5 K/UL — SIGNIFICANT CHANGE UP (ref 1.8–8)
NEUTROPHILS NFR BLD AUTO: 78.6 % — HIGH (ref 37–73)
NITRITE UR-MCNC: NEGATIVE — SIGNIFICANT CHANGE UP
PH UR: 6 — SIGNIFICANT CHANGE UP (ref 5–8)
PLATELET # BLD AUTO: 523 K/UL — HIGH (ref 150–400)
POTASSIUM SERPL-MCNC: 4 MMOL/L — SIGNIFICANT CHANGE UP (ref 3.5–5.3)
POTASSIUM SERPL-SCNC: 4 MMOL/L — SIGNIFICANT CHANGE UP (ref 3.5–5.3)
PROT SERPL-MCNC: 7.7 G/DL — SIGNIFICANT CHANGE UP (ref 6.6–8.7)
PROT UR-MCNC: 15 MG/DL
RBC # BLD: 4.4 M/UL — SIGNIFICANT CHANGE UP (ref 4.4–5.2)
RBC # FLD: 18.6 % — HIGH (ref 11–15.6)
RBC CASTS # UR COMP ASSIST: SIGNIFICANT CHANGE UP /HPF (ref 0–4)
SODIUM SERPL-SCNC: 136 MMOL/L — SIGNIFICANT CHANGE UP (ref 135–145)
SP GR SPEC: 1.01 — SIGNIFICANT CHANGE UP (ref 1.01–1.02)
UROBILINOGEN FLD QL: NEGATIVE MG/DL — SIGNIFICANT CHANGE UP
WBC # BLD: 9.6 K/UL — SIGNIFICANT CHANGE UP (ref 4.8–10.8)
WBC # FLD AUTO: 9.6 K/UL — SIGNIFICANT CHANGE UP (ref 4.8–10.8)
WBC UR QL: ABNORMAL

## 2019-02-27 PROCEDURE — 85027 COMPLETE CBC AUTOMATED: CPT

## 2019-02-27 PROCEDURE — 93010 ELECTROCARDIOGRAM REPORT: CPT

## 2019-02-27 PROCEDURE — 83735 ASSAY OF MAGNESIUM: CPT

## 2019-02-27 PROCEDURE — 80053 COMPREHEN METABOLIC PANEL: CPT

## 2019-02-27 PROCEDURE — 71046 X-RAY EXAM CHEST 2 VIEWS: CPT

## 2019-02-27 PROCEDURE — 96374 THER/PROPH/DIAG INJ IV PUSH: CPT

## 2019-02-27 PROCEDURE — 83605 ASSAY OF LACTIC ACID: CPT

## 2019-02-27 PROCEDURE — 81001 URINALYSIS AUTO W/SCOPE: CPT

## 2019-02-27 PROCEDURE — 93005 ELECTROCARDIOGRAM TRACING: CPT

## 2019-02-27 PROCEDURE — 96375 TX/PRO/DX INJ NEW DRUG ADDON: CPT

## 2019-02-27 PROCEDURE — 36415 COLL VENOUS BLD VENIPUNCTURE: CPT

## 2019-02-27 PROCEDURE — 99284 EMERGENCY DEPT VISIT MOD MDM: CPT

## 2019-02-27 PROCEDURE — 84484 ASSAY OF TROPONIN QUANT: CPT

## 2019-02-27 PROCEDURE — 99284 EMERGENCY DEPT VISIT MOD MDM: CPT | Mod: 25

## 2019-02-27 PROCEDURE — 71046 X-RAY EXAM CHEST 2 VIEWS: CPT | Mod: 26

## 2019-02-27 PROCEDURE — 87086 URINE CULTURE/COLONY COUNT: CPT

## 2019-02-27 RX ORDER — MORPHINE SULFATE 50 MG/1
2 CAPSULE, EXTENDED RELEASE ORAL ONCE
Qty: 0 | Refills: 0 | Status: DISCONTINUED | OUTPATIENT
Start: 2019-02-27 | End: 2019-02-27

## 2019-02-27 RX ORDER — ONDANSETRON 8 MG/1
4 TABLET, FILM COATED ORAL ONCE
Qty: 0 | Refills: 0 | Status: COMPLETED | OUTPATIENT
Start: 2019-02-27 | End: 2019-02-27

## 2019-02-27 RX ORDER — DEXAMETHASONE 0.5 MG/5ML
10 ELIXIR ORAL ONCE
Qty: 0 | Refills: 0 | Status: COMPLETED | OUTPATIENT
Start: 2019-02-27 | End: 2019-02-27

## 2019-02-27 RX ORDER — SODIUM CHLORIDE 9 MG/ML
2700 INJECTION INTRAMUSCULAR; INTRAVENOUS; SUBCUTANEOUS
Qty: 0 | Refills: 0 | Status: COMPLETED | OUTPATIENT
Start: 2019-02-27 | End: 2019-02-27

## 2019-02-27 RX ORDER — METOCLOPRAMIDE HCL 10 MG
10 TABLET ORAL ONCE
Qty: 0 | Refills: 0 | Status: COMPLETED | OUTPATIENT
Start: 2019-02-27 | End: 2019-02-27

## 2019-02-27 RX ORDER — DEXAMETHASONE 0.5 MG/5ML
10 ELIXIR ORAL ONCE
Qty: 0 | Refills: 0 | Status: DISCONTINUED | OUTPATIENT
Start: 2019-02-27 | End: 2019-02-27

## 2019-02-27 RX ORDER — ONDANSETRON 8 MG/1
1 TABLET, FILM COATED ORAL
Qty: 9 | Refills: 0 | OUTPATIENT
Start: 2019-02-27 | End: 2019-03-01

## 2019-02-27 RX ADMIN — ONDANSETRON 4 MILLIGRAM(S): 8 TABLET, FILM COATED ORAL at 15:19

## 2019-02-27 RX ADMIN — MORPHINE SULFATE 2 MILLIGRAM(S): 50 CAPSULE, EXTENDED RELEASE ORAL at 15:24

## 2019-02-27 RX ADMIN — SODIUM CHLORIDE 1359 MILLILITER(S): 9 INJECTION INTRAMUSCULAR; INTRAVENOUS; SUBCUTANEOUS at 15:23

## 2019-02-27 RX ADMIN — Medication 10 MILLIGRAM(S): at 15:21

## 2019-02-27 NOTE — ED ADULT NURSE NOTE - NSIMPLEMENTINTERV_GEN_ALL_ED
Implemented All Universal Safety Interventions:  [place] to call system. Call bell, personal items and telephone within reach. Instruct patient to call for assistance. Room bathroom lighting operational. Non-slip footwear when patient is off stretcher. Physically safe environment: no spills, clutter or unnecessary equipment. Stretcher in lowest position, wheels locked, appropriate side rails in place.

## 2019-02-27 NOTE — ED PROVIDER NOTE - CLINICAL SUMMARY MEDICAL DECISION MAKING FREE TEXT BOX
37 yo F with nausea and vomiting x 1, feels week, and throat pain. blood work wnl, received zofran,  NS 1 L, feels better. tolerated PO. will go home with zofran. Will treat phalangitis with decadron. She will follow up with her nephrologist.

## 2019-02-27 NOTE — ED ADULT NURSE NOTE - PSH
H/O lithotripsy    H/O:  Section x 1   and   History of Cholecystectomy  in   Kidney stone on right side  Oct 2016  S/P nasal septoplasty  in

## 2019-02-27 NOTE — ED ADULT NURSE NOTE - FAMILY HISTORY
Family history of renal cancer     Family history of type 2 diabetes mellitus     Family history of breast cancer in female     Grandparent  Still living? No  Family history of type 2 diabetes mellitus, Age at diagnosis: Age Unknown

## 2019-02-27 NOTE — ED STATDOCS - PROGRESS NOTE DETAILS
39 y/o F with PMHx of asthma, cervical incompetence, cholelithiasis, hypothyroidism presents to the ED c/o nausea, vomiting and light headed, onset 2 hours PTA. Pt advised to report to [facility] ED for symptoms of nausea and vomiting by urologist; states, " I feel like I'm about to pass out but then I don't". Reports nephrostomy tube placed 2.5 weeks ago for stent. Nephrostomy tube functioning properly. Denies fever. Patient with worsening pain will need nephrostomy tube study.

## 2019-02-27 NOTE — ED PROVIDER NOTE - OBJECTIVE STATEMENT
37 yo F hx of right nephrostomy tube for kidney stone p/w nausea, vomiting today, feeling weak and faint. She report chronic pain in the right nephrostomy tube. The tube is draining. She report throat pain, no fever, no cough, no abdominal pain.

## 2019-02-27 NOTE — ED ADULT NURSE NOTE - OBJECTIVE STATEMENT
patient a&ox3. patient reports having right nephrostomy tube placed feb 8th 2019 at [facility]. draining clear yellow urine. reports chills, light headed, nausea, vomiting beginning this morning. reports pain at incision site. patient was told to come back to ed with above symptoms. denies diarrhea and fever.

## 2019-02-27 NOTE — ED PROVIDER NOTE - PHYSICAL EXAMINATION
VITAL SIGNS: I have reviewed nursing notes and confirm.  CONSTITUTIONAL: Well-developed; well-nourished; in no acute distress.  SKIN: Skin exam is warm and dry, no acute rash.  HEAD: Normocephalic; atraumatic.  EYES: PERRL, EOM intact; conjunctiva and sclera clear.  ENT: No nasal discharge; airway clear. Throat clear.  NECK: Supple; non tender.  No lymphadenopathy.  CARD: S1, S2 normal; no murmurs, gallops, or rubs. Regular rate and rhythm.  RESP: No wheezes,  no rales or rhonchi.   ABD: Normal bowel sounds; soft; non-distended; non-tender; no hepatosplenomegaly.  Back: (+) right nephrostomy tube, draining urine, no surrounding erythema,   EXT: Normal ROM. No clubbing, cyanosis or edema.  NEURO: Alert, oriented. Grossly unremarkable. No focal deficits. no facial droop, moves all extremities,   PSYCH: Cooperative, appropriate.

## 2019-02-27 NOTE — ED ADULT TRIAGE NOTE - CHIEF COMPLAINT QUOTE
pt c/o n/v for the past hour.  states she felt like she was going to pass out, but did not.  pt had nephrostomy tube put in two weeks ago.

## 2019-02-27 NOTE — ED PROVIDER NOTE - NS ED ROS FT
Review of Systems  •	CONSTITUTIONAL - no  fever, no diaphoresis, no weight change  •	SKIN - no rash  •	HEMATOLOGIC - no bleeding, no bruising  •	EYES - no eye pain, no blurred vision  •	ENT - no change in hearing, no pain  •	RESPIRATORY - no shortness of breath, no cough  •	CARDIAC - no chest pain, no palpitations  •	GI - no abd pain, (+) nausea, (+) vomiting, no diarrhea, no constipation, no bleeding  •	GENITO-URINARY - no discharge, no dysuria; no hematuria,   •	ENDO - no polydypsia, no polyurea, no heat/no cold intolerance  •	MUSCULOSKELETAL - no joint pain, no swelling, no redness  •	NEUROLOGIC - (+) weakness, no headache, no anesthesia, no paresthesias  •	PSYCH - no anxiety, non suicidal, non homicidal, no hallucination, no depression

## 2019-02-28 LAB
CULTURE RESULTS: SIGNIFICANT CHANGE UP
SPECIMEN SOURCE: SIGNIFICANT CHANGE UP

## 2019-03-05 ENCOUNTER — OUTPATIENT (OUTPATIENT)
Dept: OUTPATIENT SERVICES | Facility: HOSPITAL | Age: 39
LOS: 1 days | End: 2019-03-05
Payer: COMMERCIAL

## 2019-03-05 DIAGNOSIS — N20.0 CALCULUS OF KIDNEY: Chronic | ICD-10-CM

## 2019-03-05 DIAGNOSIS — N13.5 CROSSING VESSEL AND STRICTURE OF URETER WITHOUT HYDRONEPHROSIS: ICD-10-CM

## 2019-03-05 DIAGNOSIS — Z98.89 OTHER SPECIFIED POSTPROCEDURAL STATES: Chronic | ICD-10-CM

## 2019-03-05 PROCEDURE — 78708 K FLOW/FUNCT IMAGE W/DRUG: CPT

## 2019-03-05 PROCEDURE — A9562: CPT

## 2019-03-05 PROCEDURE — 78708 K FLOW/FUNCT IMAGE W/DRUG: CPT | Mod: 26

## 2019-03-05 RX ORDER — SODIUM CHLORIDE 9 MG/ML
870 INJECTION INTRAMUSCULAR; INTRAVENOUS; SUBCUTANEOUS ONCE
Qty: 0 | Refills: 0 | Status: DISCONTINUED | OUTPATIENT
Start: 2019-03-05 | End: 2019-03-20

## 2019-03-05 RX ORDER — FUROSEMIDE 40 MG
40 TABLET ORAL ONCE
Qty: 0 | Refills: 0 | Status: DISCONTINUED | OUTPATIENT
Start: 2019-03-05 | End: 2019-03-20

## 2019-03-12 ENCOUNTER — OUTPATIENT (OUTPATIENT)
Dept: OUTPATIENT SERVICES | Facility: HOSPITAL | Age: 39
LOS: 1 days | End: 2019-03-12
Payer: COMMERCIAL

## 2019-03-12 VITALS
WEIGHT: 206.79 LBS | HEIGHT: 66 IN | DIASTOLIC BLOOD PRESSURE: 94 MMHG | HEART RATE: 85 BPM | RESPIRATION RATE: 20 BRPM | SYSTOLIC BLOOD PRESSURE: 136 MMHG | TEMPERATURE: 98 F

## 2019-03-12 DIAGNOSIS — Z13.89 ENCOUNTER FOR SCREENING FOR OTHER DISORDER: ICD-10-CM

## 2019-03-12 DIAGNOSIS — N20.0 CALCULUS OF KIDNEY: ICD-10-CM

## 2019-03-12 DIAGNOSIS — Z98.89 OTHER SPECIFIED POSTPROCEDURAL STATES: Chronic | ICD-10-CM

## 2019-03-12 DIAGNOSIS — Z01.818 ENCOUNTER FOR OTHER PREPROCEDURAL EXAMINATION: ICD-10-CM

## 2019-03-12 DIAGNOSIS — Z29.9 ENCOUNTER FOR PROPHYLACTIC MEASURES, UNSPECIFIED: ICD-10-CM

## 2019-03-12 DIAGNOSIS — N20.0 CALCULUS OF KIDNEY: Chronic | ICD-10-CM

## 2019-03-12 LAB
ANION GAP SERPL CALC-SCNC: 13 MMOL/L — SIGNIFICANT CHANGE UP (ref 5–17)
APPEARANCE UR: CLEAR — SIGNIFICANT CHANGE UP
BACTERIA # UR AUTO: ABNORMAL
BILIRUB UR-MCNC: NEGATIVE — SIGNIFICANT CHANGE UP
BUN SERPL-MCNC: 13 MG/DL — SIGNIFICANT CHANGE UP (ref 8–20)
CALCIUM SERPL-MCNC: 9.5 MG/DL — SIGNIFICANT CHANGE UP (ref 8.6–10.2)
CHLORIDE SERPL-SCNC: 105 MMOL/L — SIGNIFICANT CHANGE UP (ref 98–107)
CO2 SERPL-SCNC: 22 MMOL/L — SIGNIFICANT CHANGE UP (ref 22–29)
COLOR SPEC: YELLOW — SIGNIFICANT CHANGE UP
CREAT SERPL-MCNC: 0.72 MG/DL — SIGNIFICANT CHANGE UP (ref 0.5–1.3)
DIFF PNL FLD: ABNORMAL
EPI CELLS # UR: SIGNIFICANT CHANGE UP
GLUCOSE SERPL-MCNC: 100 MG/DL — SIGNIFICANT CHANGE UP (ref 70–115)
GLUCOSE UR QL: NEGATIVE MG/DL — SIGNIFICANT CHANGE UP
HCT VFR BLD CALC: 35.6 % — LOW (ref 37–47)
HGB BLD-MCNC: 11.1 G/DL — LOW (ref 12–16)
KETONES UR-MCNC: NEGATIVE — SIGNIFICANT CHANGE UP
LEUKOCYTE ESTERASE UR-ACNC: ABNORMAL
MCHC RBC-ENTMCNC: 24.4 PG — LOW (ref 27–31)
MCHC RBC-ENTMCNC: 31.2 G/DL — LOW (ref 32–36)
MCV RBC AUTO: 78.4 FL — LOW (ref 81–99)
NITRITE UR-MCNC: POSITIVE
PH UR: 7 — SIGNIFICANT CHANGE UP (ref 5–8)
PLATELET # BLD AUTO: 426 K/UL — HIGH (ref 150–400)
POTASSIUM SERPL-MCNC: 3.8 MMOL/L — SIGNIFICANT CHANGE UP (ref 3.5–5.3)
POTASSIUM SERPL-SCNC: 3.8 MMOL/L — SIGNIFICANT CHANGE UP (ref 3.5–5.3)
PROT UR-MCNC: 100 MG/DL
RBC # BLD: 4.54 M/UL — SIGNIFICANT CHANGE UP (ref 4.4–5.2)
RBC # FLD: 19.1 % — HIGH (ref 11–15.6)
RBC CASTS # UR COMP ASSIST: ABNORMAL /HPF (ref 0–4)
SODIUM SERPL-SCNC: 140 MMOL/L — SIGNIFICANT CHANGE UP (ref 135–145)
SP GR SPEC: 1.01 — SIGNIFICANT CHANGE UP (ref 1.01–1.02)
UROBILINOGEN FLD QL: NEGATIVE MG/DL — SIGNIFICANT CHANGE UP
WBC # BLD: 8.2 K/UL — SIGNIFICANT CHANGE UP (ref 4.8–10.8)
WBC # FLD AUTO: 8.2 K/UL — SIGNIFICANT CHANGE UP (ref 4.8–10.8)
WBC UR QL: ABNORMAL

## 2019-03-12 PROCEDURE — 87086 URINE CULTURE/COLONY COUNT: CPT

## 2019-03-12 PROCEDURE — 36415 COLL VENOUS BLD VENIPUNCTURE: CPT

## 2019-03-12 PROCEDURE — 81001 URINALYSIS AUTO W/SCOPE: CPT

## 2019-03-12 PROCEDURE — 80048 BASIC METABOLIC PNL TOTAL CA: CPT

## 2019-03-12 PROCEDURE — 85027 COMPLETE CBC AUTOMATED: CPT

## 2019-03-12 NOTE — H&P PST ADULT - NSICDXPROBLEM_GEN_ALL_CORE_FT
PROBLEM DIAGNOSES  Problem: Screening for substance abuse  Assessment and Plan:     Problem: Need for prophylactic measure  Assessment and Plan: PROBLEM DIAGNOSES  Problem: Calculus of kidney  Assessment and Plan: Right Percutaneous Nephrolithotomy, 2nd Look    Problem: Screening for substance abuse  Assessment and Plan:     Problem: Need for prophylactic measure  Assessment and Plan:

## 2019-03-12 NOTE — H&P PST ADULT - NSICDXPASTSURGICALHX_GEN_ALL_CORE_FT
PAST SURGICAL HISTORY:  H/O lithotripsy     H/O:  Section x 1  and     History of Cholecystectomy in     Kidney stone on right side Oct 2016    S/P nasal septoplasty in

## 2019-03-12 NOTE — H&P PST ADULT - GENERAL COMMENTS
Two weeks ago; seen in [facility] ED; no treatment rendered, however, ruled out infection to nephrostomy tube

## 2019-03-12 NOTE — H&P PST ADULT - ASSESSMENT
CAPRINI VTE 2.0 SCORE [CLOT updated 2019]    AGE RELATED RISK FACTORS                                                       MOBILITY RELATED FACTORS  [ ] Age 41-60 years                                            (1 Point)                    [ ] Bed rest                                                        (1 Point)  [ ] Age: 61-74 years                                           (2 Points)                  [ ] Plaster cast                                                   (2 Points)  [ ] Age= 75 years                                              (3 Points)                    [ ] Bed bound for more than 72 hours                 (2 Points)    DISEASE RELATED RISK FACTORS                                               GENDER SPECIFIC FACTORS  [ ] Edema in the lower extremities                       (1 Point)              [ ] Pregnancy                                                     (1 Point)  [ ] Varicose veins                                               (1 Point)                     [ ] Post-partum < 6 weeks                                   (1 Point)             [ ] BMI > 25 Kg/m2                                            (1 Point)                     [ ] Hormonal therapy  or oral contraception          (1 Point)                 [ ] Sepsis (in the previous month)                        (1 Point)               [ ] History of pregnancy complications                 (1 point)  [ ] Pneumonia or serious lung disease                                               [ ] Unexplained or recurrent                     (1 Point)           (in the previous month)                               (1 Point)  [ ] Abnormal pulmonary function test                     (1 Point)                 SURGERY RELATED RISK FACTORS  [ ] Acute myocardial infarction                              (1 Point)               [ ]  Section                                             (1 Point)  [ ] Congestive heart failure (in the previous month)  (1 Point)      [ ] Minor surgery                                                  (1 Point)   [ ] Inflammatory bowel disease                             (1 Point)               [ ] Arthroscopic surgery                                        (2 Points)  [ ] Central venous access                                      (2 Points)                [ ] General surgery lasting more than 45 minutes (2 points)  [ ] Malignancy- Present or previous                   (2 Points)                [ ] Elective arthroplasty                                         (5 points)    [ ] Stroke (in the previous month)                          (5 Points)                                                                                                                                                           HEMATOLOGY RELATED FACTORS                                                 TRAUMA RELATED RISK FACTORS  [ ] Prior episodes of VTE                                     (3 Points)                [ ] Fracture of the hip, pelvis, or leg                       (5 Points)  [ ] Positive family history for VTE                         (3 Points)             [ ] Acute spinal cord injury (in the previous month)  (5 Points)  [ ] Prothrombin 36983 A                                     (3 Points)               [ ] Paralysis  (less than 1 month)                             (5 Points)  [ ] Factor V Leiden                                             (3 Points)                  [ ] Multiple Trauma within 1 month                        (5 Points)  [ ] Lupus anticoagulants                                     (3 Points)                                                           [ ] Anticardiolipin antibodies                               (3 Points)                                                       [ ] High homocysteine in the blood                      (3 Points)                                             [ ] Other congenital or acquired thrombophilia      (3 Points)                                                [ ] Heparin induced thrombocytopenia                  (3 Points)                                     Total Score [          ]      OPIOID RISK TOOL    JEAN-PAUL EACH BOX THAT APPLIES AND ADD TOTALS AT THE END    FAMILY HISTORY OF SUBSTANCE ABUSE                 FEMALE         MALE                                                Alcohol                            [    ] 1 pt         [     ] 3pts                                               Illegal Drugs                    [    ] 2 pts       [     ] 3pts                                               Rx Drugs                          [      ]4 pts      [     ] 4 pts    PERSONAL HISTORY OF SUBSTANCE ABUSE                                                                                          Alcohol                            [     ] 3 pts       [     ] 3 pts                                               Illegal Drugs                    [     ] 4 pts       [     ] 4 pts                                               Rx Drugs                          [     ] 5 pts       [     ] 5 pts    AGE BETWEEN 16-45 YEARS                                     [     ] 1 pt         [     ] 1 pt    HISTORY OF PREADOLESCENT   SEXUAL ABUSE                                                            [     ] 3 pts        [     ] 0pts    PSYCHOLOGICAL DISEASE                     ADD, OCD, Bipolar, Schizophrenia        [     ] 2 pts        [     ] 2 pts                      Depression                                               [     ] 1 pt          [     ] 1 pt           SCORING TOTAL   (add numbers and type here)              (      )                                     A score of 3 or lower indicated LOW risk for future opioid abuse  A score of 4 to 7 indicated moderate risk for future opioid abuse  A score of 8 or higher indicates a high risk for opioid abuse CAPRINI VTE 2.0 SCORE [CLOT updated 2019]    AGE RELATED RISK FACTORS                                                       MOBILITY RELATED FACTORS  [ ] Age 41-60 years                                            (1 Point)                    [ ] Bed rest                                                        (1 Point)  [ ] Age: 61-74 years                                           (2 Points)                  [ ] Plaster cast                                                   (2 Points)  [ ] Age= 75 years                                              (3 Points)                    [ ] Bed bound for more than 72 hours                 (2 Points)    DISEASE RELATED RISK FACTORS                                               GENDER SPECIFIC FACTORS  [ ] Edema in the lower extremities                       (1 Point)              [ ] Pregnancy                                                     (1 Point)  [ ] Varicose veins                                               (1 Point)                     [ ] Post-partum < 6 weeks                                   (1 Point)             [x ] BMI > 25 Kg/m2                                            (1 Point)                     [ ] Hormonal therapy  or oral contraception          (1 Point)                 [ ] Sepsis (in the previous month)                        (1 Point)               [ ] History of pregnancy complications                 (1 point)  [ ] Pneumonia or serious lung disease                                               [ ] Unexplained or recurrent                     (1 Point)           (in the previous month)                               (1 Point)  [ ] Abnormal pulmonary function test                     (1 Point)                 SURGERY RELATED RISK FACTORS  [ ] Acute myocardial infarction                              (1 Point)               [ ]  Section                                             (1 Point)  [ ] Congestive heart failure (in the previous month)  (1 Point)      [ ] Minor surgery                                                  (1 Point)   [ ] Inflammatory bowel disease                             (1 Point)               [ ] Arthroscopic surgery                                        (2 Points)  [ ] Central venous access                                      (2 Points)                [x] General surgery lasting more than 45 minutes (2 points)  [ ] Malignancy- Present or previous                   (2 Points)                [ ] Elective arthroplasty                                         (5 points)    [ ] Stroke (in the previous month)                          (5 Points)                                                                                                                                                           HEMATOLOGY RELATED FACTORS                                                 TRAUMA RELATED RISK FACTORS  [ ] Prior episodes of VTE                                     (3 Points)                [ ] Fracture of the hip, pelvis, or leg                       (5 Points)  [ ] Positive family history for VTE                         (3 Points)             [ ] Acute spinal cord injury (in the previous month)  (5 Points)  [ ] Prothrombin 00464 A                                     (3 Points)               [ ] Paralysis  (less than 1 month)                             (5 Points)  [ ] Factor V Leiden                                             (3 Points)                  [ ] Multiple Trauma within 1 month                        (5 Points)  [ ] Lupus anticoagulants                                     (3 Points)                                                           [ ] Anticardiolipin antibodies                               (3 Points)                                                       [ ] High homocysteine in the blood                      (3 Points)                                             [ ] Other congenital or acquired thrombophilia      (3 Points)                                                [ ] Heparin induced thrombocytopenia                  (3 Points)                                     Total Score [    3      ]      OPIOID RISK TOOL    JEAN-PAUL EACH BOX THAT APPLIES AND ADD TOTALS AT THE END    FAMILY HISTORY OF SUBSTANCE ABUSE                 FEMALE         MALE                                                Alcohol                            [    ] 1 pt         [     ] 3pts                                               Illegal Drugs                    [    ] 2 pts       [     ] 3pts                                               Rx Drugs                          [      ]4 pts      [     ] 4 pts    PERSONAL HISTORY OF SUBSTANCE ABUSE                                                                                          Alcohol                            [     ] 3 pts       [     ] 3 pts                                               Illegal Drugs                    [     ] 4 pts       [     ] 4 pts                                               Rx Drugs                          [     ] 5 pts       [     ] 5 pts    AGE BETWEEN 16-45 YEARS                                     [  x   ] 1 pt         [     ] 1 pt    HISTORY OF PREADOLESCENT   SEXUAL ABUSE                                                            [     ] 3 pts        [     ] 0pts    PSYCHOLOGICAL DISEASE                     ADD, OCD, Bipolar, Schizophrenia        [     ] 2 pts        [     ] 2 pts                      Depression                                               [     ] 1 pt          [     ] 1 pt           SCORING TOTAL   (add numbers and type here)              (  1    )                                     A score of 3 or lower indicated LOW risk for future opioid abuse  A score of 4 to 7 indicated moderate risk for future opioid abuse  A score of 8 or higher indicates a high risk for opioid abuse

## 2019-03-12 NOTE — H&P PST ADULT - NSICDXFAMILYHX_GEN_ALL_CORE_FT
FAMILY HISTORY:  Family history of breast cancer in female  Family history of renal cancer  Family history of type 2 diabetes mellitus    Grandparent  Still living? No  Family history of type 2 diabetes mellitus, Age at diagnosis: Age Unknown

## 2019-03-12 NOTE — H&P PST ADULT - LAB RESULTS AND INTERPRETATION
3/13/2019 Dr. Loo's office made aware of abnormal urine results as culture will most likely not be resulted by the time pt has her surgery tomorrow.  Urine report was faxed to his office by myself. (JERICA MENDOZA)

## 2019-03-12 NOTE — H&P PST ADULT - HISTORY OF PRESENT ILLNESS
This is a 38 y.o female who presents to PST today.  The pt reports a history of right renal colic for which she underwent a nephrostomy tube in February.  She is scheduled for surgery and removal of same in the very near future.

## 2019-03-12 NOTE — H&P PST ADULT - NSICDXPASTMEDICALHX_GEN_ALL_CORE_FT
PAST MEDICAL HISTORY:  Asthma Last asthma attack was years ago    Cervical incompetence     Cholelithiasis     Hypothyroidism     Renal calculus

## 2019-03-12 NOTE — H&P PST ADULT - BACK COMMENTS
Pt has right nephrostomy tube in place; patent and draining clear yellow urine.  Dressing in place is dry and intact

## 2019-03-12 NOTE — ASU PATIENT PROFILE, ADULT - PMH
Asthma  Last asthma attack was years ago  Cervical incompetence    Cholelithiasis    Hypothyroidism    Renal calculus

## 2019-03-13 ENCOUNTER — OTHER (OUTPATIENT)
Age: 39
End: 2019-03-13

## 2019-03-13 DIAGNOSIS — N13.5 CROSSING VESSEL AND STRICTURE OF URETER W/OUT HYDRONEPHROSIS: ICD-10-CM

## 2019-03-13 LAB
CULTURE RESULTS: NO GROWTH — SIGNIFICANT CHANGE UP
SPECIMEN SOURCE: SIGNIFICANT CHANGE UP

## 2019-03-14 ENCOUNTER — INPATIENT (INPATIENT)
Facility: HOSPITAL | Age: 39
LOS: 0 days | Discharge: HOME | DRG: 661 | End: 2019-03-15
Attending: UROLOGY | Admitting: UROLOGY
Payer: SELF-PAY

## 2019-03-14 ENCOUNTER — APPOINTMENT (OUTPATIENT)
Dept: UROLOGY | Facility: HOSPITAL | Age: 39
End: 2019-03-14

## 2019-03-14 ENCOUNTER — RESULT REVIEW (OUTPATIENT)
Age: 39
End: 2019-03-14

## 2019-03-14 VITALS
DIASTOLIC BLOOD PRESSURE: 89 MMHG | RESPIRATION RATE: 16 BRPM | SYSTOLIC BLOOD PRESSURE: 131 MMHG | HEIGHT: 66 IN | HEART RATE: 86 BPM | TEMPERATURE: 98 F | OXYGEN SATURATION: 100 % | WEIGHT: 206.79 LBS

## 2019-03-14 DIAGNOSIS — N20.0 CALCULUS OF KIDNEY: Chronic | ICD-10-CM

## 2019-03-14 DIAGNOSIS — N20.0 CALCULUS OF KIDNEY: ICD-10-CM

## 2019-03-14 DIAGNOSIS — Z98.89 OTHER SPECIFIED POSTPROCEDURAL STATES: Chronic | ICD-10-CM

## 2019-03-14 PROCEDURE — 88300 SURGICAL PATH GROSS: CPT | Mod: 26

## 2019-03-14 RX ORDER — SODIUM CHLORIDE 9 MG/ML
1000 INJECTION, SOLUTION INTRAVENOUS
Qty: 0 | Refills: 0 | Status: DISCONTINUED | OUTPATIENT
Start: 2019-03-14 | End: 2019-03-14

## 2019-03-14 RX ORDER — KETOROLAC TROMETHAMINE 30 MG/ML
30 SYRINGE (ML) INJECTION EVERY 8 HOURS
Qty: 0 | Refills: 0 | Status: DISCONTINUED | OUTPATIENT
Start: 2019-03-14 | End: 2019-03-15

## 2019-03-14 RX ORDER — ONDANSETRON 8 MG/1
4 TABLET, FILM COATED ORAL ONCE
Qty: 0 | Refills: 0 | Status: COMPLETED | OUTPATIENT
Start: 2019-03-14 | End: 2019-03-14

## 2019-03-14 RX ORDER — GENTAMICIN SULFATE 40 MG/ML
180 VIAL (ML) INJECTION ONCE
Qty: 0 | Refills: 0 | Status: COMPLETED | OUTPATIENT
Start: 2019-03-14 | End: 2019-03-14

## 2019-03-14 RX ORDER — DEXTROSE MONOHYDRATE, SODIUM CHLORIDE, AND POTASSIUM CHLORIDE 50; .745; 4.5 G/1000ML; G/1000ML; G/1000ML
1000 INJECTION, SOLUTION INTRAVENOUS
Qty: 0 | Refills: 0 | Status: DISCONTINUED | OUTPATIENT
Start: 2019-03-14 | End: 2019-03-15

## 2019-03-14 RX ORDER — HYDROMORPHONE HYDROCHLORIDE 2 MG/ML
0.5 INJECTION INTRAMUSCULAR; INTRAVENOUS; SUBCUTANEOUS
Qty: 0 | Refills: 0 | Status: DISCONTINUED | OUTPATIENT
Start: 2019-03-14 | End: 2019-03-14

## 2019-03-14 RX ORDER — VANCOMYCIN HCL 1 G
1500 VIAL (EA) INTRAVENOUS ONCE
Qty: 0 | Refills: 0 | Status: COMPLETED | OUTPATIENT
Start: 2019-03-14 | End: 2019-03-14

## 2019-03-14 RX ADMIN — Medication 200 MILLIGRAM(S): at 17:58

## 2019-03-14 RX ADMIN — ONDANSETRON 4 MILLIGRAM(S): 8 TABLET, FILM COATED ORAL at 23:49

## 2019-03-14 RX ADMIN — HYDROMORPHONE HYDROCHLORIDE 0.5 MILLIGRAM(S): 2 INJECTION INTRAMUSCULAR; INTRAVENOUS; SUBCUTANEOUS at 20:30

## 2019-03-14 RX ADMIN — Medication 300 MILLIGRAM(S): at 17:12

## 2019-03-14 RX ADMIN — ONDANSETRON 4 MILLIGRAM(S): 8 TABLET, FILM COATED ORAL at 20:30

## 2019-03-15 ENCOUNTER — TRANSCRIPTION ENCOUNTER (OUTPATIENT)
Age: 39
End: 2019-03-15

## 2019-03-15 VITALS
TEMPERATURE: 98 F | SYSTOLIC BLOOD PRESSURE: 125 MMHG | RESPIRATION RATE: 18 BRPM | DIASTOLIC BLOOD PRESSURE: 77 MMHG | HEART RATE: 81 BPM

## 2019-03-15 LAB — HCG UR QL: NEGATIVE — SIGNIFICANT CHANGE UP

## 2019-03-15 PROCEDURE — C1889: CPT

## 2019-03-15 PROCEDURE — 88300 SURGICAL PATH GROSS: CPT

## 2019-03-15 PROCEDURE — 82365 CALCULUS SPECTROSCOPY: CPT

## 2019-03-15 PROCEDURE — 76000 FLUOROSCOPY <1 HR PHYS/QHP: CPT

## 2019-03-15 PROCEDURE — 81025 URINE PREGNANCY TEST: CPT

## 2019-03-15 PROCEDURE — C1769: CPT

## 2019-03-15 RX ORDER — OXYCODONE AND ACETAMINOPHEN 5; 325 MG/1; MG/1
1 TABLET ORAL
Qty: 12 | Refills: 0
Start: 2019-03-15

## 2019-03-15 RX ORDER — INFLUENZA VIRUS VACCINE 15; 15; 15; 15 UG/.5ML; UG/.5ML; UG/.5ML; UG/.5ML
0.5 SUSPENSION INTRAMUSCULAR ONCE
Qty: 0 | Refills: 0 | Status: DISCONTINUED | OUTPATIENT
Start: 2019-03-15 | End: 2019-03-15

## 2019-03-15 RX ORDER — HYDROMORPHONE HYDROCHLORIDE 2 MG/ML
0.5 INJECTION INTRAMUSCULAR; INTRAVENOUS; SUBCUTANEOUS ONCE
Qty: 0 | Refills: 0 | Status: DISCONTINUED | OUTPATIENT
Start: 2019-03-15 | End: 2019-03-15

## 2019-03-15 RX ADMIN — Medication 1 TABLET(S): at 05:39

## 2019-03-15 RX ADMIN — Medication 30 MILLIGRAM(S): at 00:47

## 2019-03-15 RX ADMIN — HYDROMORPHONE HYDROCHLORIDE 0.5 MILLIGRAM(S): 2 INJECTION INTRAMUSCULAR; INTRAVENOUS; SUBCUTANEOUS at 07:05

## 2019-03-15 RX ADMIN — Medication 30 MILLIGRAM(S): at 00:32

## 2019-03-15 RX ADMIN — DEXTROSE MONOHYDRATE, SODIUM CHLORIDE, AND POTASSIUM CHLORIDE 100 MILLILITER(S): 50; .745; 4.5 INJECTION, SOLUTION INTRAVENOUS at 02:26

## 2019-03-15 RX ADMIN — HYDROMORPHONE HYDROCHLORIDE 0.5 MILLIGRAM(S): 2 INJECTION INTRAMUSCULAR; INTRAVENOUS; SUBCUTANEOUS at 06:49

## 2019-03-15 NOTE — DISCHARGE NOTE PROVIDER - CARE PROVIDER_API CALL
Milind Loo)  Urology  [address], [address]  [place] [address]  Phone: [phone]  Fax: [phone]  Follow Up Time:

## 2019-03-15 NOTE — DISCHARGE NOTE PROVIDER - NSDCCPTREATMENT_GEN_ALL_CORE_FT
PRINCIPAL PROCEDURE  Procedure: Percutaneous nephrostolithotomy with lithotripsy of large calculus  Findings and Treatment: right side

## 2019-03-15 NOTE — PROGRESS NOTE ADULT - ASSESSMENT
39 yo female POD#1 s/p R PCNL, afebrile  - right nephrostomy tube d/c'd, occlusive dressing applied  - oob/ ambulate  - d/c home today with f/u with Dr. Loo  - pt to continue bactrim that she was on prior to admission    allpt's questions answered    case reviewed with Dr. Loo

## 2019-03-15 NOTE — PROGRESS NOTE ADULT - SUBJECTIVE AND OBJECTIVE BOX
Subjective:38yFemale POD#1 s/p R PCNL.  Pt feeling well this am, mild nausea from the dilaudid given earlier this am, pain improved.  Pt has been voiding without difficulty, tolerating diet, oob and ambulating.  Pt remained afebrile overnight, right NT in place, draining pink tinged urine.      Vital Signs Last 24 Hrs  T(C): 36.9 (15 Mar 2019 05:19), Max: 37.2 (14 Mar 2019 22:35)  T(F): 98.4 (15 Mar 2019 05:19), Max: 98.9 (14 Mar 2019 22:35)  HR: 85 (15 Mar 2019 05:19) (69 - 86)  BP: 105/66 (15 Mar 2019 05:19) (105/66 - 131/89)  BP(mean): --  RR: 18 (15 Mar 2019 05:19) (14 - 20)  SpO2: 96% (15 Mar 2019 05:19) (96% - 100%)  I&O's Detail    14 Mar 2019 07:01  -  15 Mar 2019 07:00  --------------------------------------------------------  IN:    sodium chloride 0.45% with potassium chloride 20 mEq/L: 500 mL  Total IN: 500 mL    OUT:    Indwelling Catheter - Urethral: 300 mL    Nephrostomy Tube: 500 mL  Total OUT: 800 mL    Total NET: -300 mL

## 2019-03-15 NOTE — DISCHARGE NOTE NURSING/CASE MANAGEMENT/SOCIAL WORK - NSDCDPATPORTLINK_GEN_ALL_CORE
You can access the [facility], offered by [facility], by registering with the following website: [email or web address]

## 2019-03-15 NOTE — DISCHARGE NOTE PROVIDER - NSDCCPCAREPLAN_GEN_ALL_CORE_FT
PRINCIPAL DISCHARGE DIAGNOSIS  Diagnosis: Hydronephrosis with obstructing calculus  Assessment and Plan of Treatment:

## 2019-03-15 NOTE — DISCHARGE NOTE PROVIDER - HOSPITAL COURSE
37 yo female with chronic right renal stone, pt had a right nephrostomy tube in place.  Pt underwent a right PCNL 3/14.  Patient is doing well on POD#1, afebrile, voiding.  Right nephrostomy tube was removed in the morning and patient is stable and ready for discharge to home.  pt will continue her oral antibiotics that she was on prior to admission.

## 2019-03-15 NOTE — DISCHARGE NOTE PROVIDER - NSDCQMSTROKE_NEU_ALL_CORE
[facility]    FACILITY TRANSFER ORDERS      Patient Name: Michael Shetty  YOB: 1947    PCP: Michael Ellsworth MD   PCP Address: [address] [place] [address] [address] / [address] [place] [address]  PCP Phone Number: [phone]  PCP Fax: [phone]    Encounter Date: 01/14/2019    Admit to:     [facility]    Vital Signs:      Routine    Diagnoses:   Active Hospital Problems    Diagnosis  POA    *Acute on chronic respiratory failure with hypoxemia [J96.21]  Yes    Chronic systolic heart failure [I50.22]  Yes    Pseudomonas pneumonia [J15.1]  Yes    Goals of care, counseling/discussion [Z71.89]  Not Applicable    Palliative care encounter [Z51.5]  Not Applicable    Chronic pulmonary embolism [I27.82]  Yes    Chronic hypotension [I95.89]  Yes    Pulmonary emphysema [J43.9]  Yes    Coronary artery disease involving coronary bypass graft [I25.810]  Yes    Chronic pneumonia [J18.9]  Yes    Primary malignant neoplasm of left upper lobe of lung [C34.12]  Yes      Resolved Hospital Problems   No resolved problems to display.       Allergies:  Review of patient's allergies indicates:   Allergen Reactions    Shellfish containing products      soft shell crabs       Diet:     regular diet    Activities:     Activity as tolerated    Nursing:     Per facility protocol     Labs:     CBC Q48H  BMP Q48H    CONSULTS:    Physical Therapy to evaluate and treat. , Occupational Therapy to evaluate and treat. and  to evaluate for community resources/long-range planning.  Respiratory care for HFNC  Pulmonology for FiO2 weaning  Infectious disease for IV antibiotics    MISCELLANEOUS CARE:  Routine Skin for Bedridden Patients: Apply moisture barrier cream to all skin folds and wet areas in perineal area daily and after baths and all bowel movements.    WOUND CARE ORDERS  None    Medications: Review discharge medications with patient and family and provide education.      Current Discharge Medication  List      START taking these medications    Details   albuterol-ipratropium (DUO-NEB) 2.5 mg-0.5 mg/3 mL nebulizer solution Take 3 mLs by nebulization every 6 (six) hours. Rescue  Qty: 1 Box, Refills: 0      budesonide (PULMICORT) 0.5 mg/2 mL nebulizer solution Take 2 mLs (0.5 mg total) by nebulization every 12 (twelve) hours. Controller  Refills: 0      furosemide (LASIX) 20 MG tablet Take 1 tablet (20 mg total) by mouth 2 (two) times daily.  Qty: 60 tablet, Refills: 11      meropenem-0.9% sodium chloride 1 gram/50 mL PgBk Inject 50 mLs (1 g total) into the vein every 8 (eight) hours.      pantoprazole (PROTONIX) 40 MG tablet Take 1 tablet (40 mg total) by mouth once daily.  Qty: 30 tablet, Refills: 11      predniSONE (DELTASONE) 10 MG tablet Take 3 tablets (30 mg total) by mouth once daily. for 10 days  Qty: 30 tablet, Refills: 0      tiotropium (SPIRIVA) 18 mcg inhalation capsule Inhale 1 capsule (18 mcg total) into the lungs once daily. Controller  Refills: 0         CONTINUE these medications which have CHANGED    Details   apixaban (ELIQUIS) 5 mg Tab Take 1 tablet (5 mg total) by mouth 2 (two) times daily.         CONTINUE these medications which have NOT CHANGED    Details   allopurinol (ZYLOPRIM) 100 MG tablet Take 100 mg by mouth once daily.       multivitamin (MEN'S MULTI-VITAMIN) per tablet Take 1 tablet by mouth once daily.      pravastatin (PRAVACHOL) 40 MG tablet Take 40 mg by mouth nightly.      VENTOLIN HFA 90 mcg/actuation inhaler Inhale 1-2 puffs into the lungs every 6 (six) hours as needed.          STOP taking these medications       atenolol (TENORMIN) 25 MG tablet Comments:   Reason for Stopping:         cefUROXime (CEFTIN) 500 MG tablet Comments:   Reason for Stopping:         cilostazol (PLETAL) 100 MG Tab Comments:   Reason for Stopping:         ciprofloxacin HCl (CIPRO) 250 MG tablet Comments:   Reason for Stopping:         doxycycline (VIBRA-TABS) 100 MG tablet Comments:   Reason for  Stopping:         nitroGLYCERIN (NITROSTAT) 0.3 MG SL tablet Comments:   Reason for Stopping:         PREDNISOLONE ORAL Comments:   Reason for Stopping:         ranitidine (ZANTAC 75) 75 MG tablet Comments:   Reason for Stopping:         rivaroxaban (XARELTO) 20 mg Tab Comments:   Reason for Stopping:         tamsulosin (FLOMAX) 0.4 mg Cap Comments:   Reason for Stopping:         terazosin (HYTRIN) 2 MG capsule Comments:   Reason for Stopping:                    _________________________________  Josh Bateman MD  01/14/2019           No

## 2019-03-18 LAB — SURGICAL PATHOLOGY STUDY: SIGNIFICANT CHANGE UP

## 2019-03-19 LAB — COMPN STONE: SIGNIFICANT CHANGE UP

## 2019-03-21 ENCOUNTER — APPOINTMENT (OUTPATIENT)
Dept: UROLOGY | Facility: CLINIC | Age: 39
End: 2019-03-21
Payer: COMMERCIAL

## 2019-03-21 VITALS — SYSTOLIC BLOOD PRESSURE: 135 MMHG | DIASTOLIC BLOOD PRESSURE: 86 MMHG | HEART RATE: 112 BPM

## 2019-03-21 PROCEDURE — 99024 POSTOP FOLLOW-UP VISIT: CPT

## 2019-03-21 RX ORDER — SULFAMETHOXAZOLE AND TRIMETHOPRIM 800; 160 MG/1; MG/1
800-160 TABLET ORAL TWICE DAILY
Qty: 14 | Refills: 0 | Status: DISCONTINUED | COMMUNITY
Start: 2019-03-13 | End: 2019-03-21

## 2019-03-21 RX ORDER — OXYCODONE AND ACETAMINOPHEN 5; 325 MG/1; MG/1
5-325 TABLET ORAL
Qty: 20 | Refills: 0 | Status: DISCONTINUED | COMMUNITY
Start: 2019-01-17 | End: 2019-03-21

## 2019-03-21 NOTE — PHYSICAL EXAM
[General Appearance - Well Developed] : well developed [General Appearance - Well Nourished] : well nourished [Normal Appearance] : normal appearance [Well Groomed] : well groomed [General Appearance - In No Acute Distress] : no acute distress [Abdomen Soft] : soft [Abdomen Tenderness] : non-tender [Costovertebral Angle Tenderness] : no ~M costovertebral angle tenderness [FreeTextEntry1] : healing incision on right CVA region [Edema] : no peripheral edema [] : no respiratory distress [Respiration, Rhythm And Depth] : normal respiratory rhythm and effort [Exaggerated Use Of Accessory Muscles For Inspiration] : no accessory muscle use [Oriented To Time, Place, And Person] : oriented to person, place, and time [Affect] : the affect was normal [Mood] : the mood was normal [Not Anxious] : not anxious [Normal Station and Gait] : the gait and station were normal for the patient's age [No Focal Deficits] : no focal deficits

## 2019-03-28 ENCOUNTER — APPOINTMENT (OUTPATIENT)
Dept: INTERNAL MEDICINE | Facility: CLINIC | Age: 39
End: 2019-03-28
Payer: COMMERCIAL

## 2019-03-28 ENCOUNTER — LABORATORY RESULT (OUTPATIENT)
Age: 39
End: 2019-03-28

## 2019-03-28 ENCOUNTER — RESULT REVIEW (OUTPATIENT)
Age: 39
End: 2019-03-28

## 2019-03-28 VITALS
HEIGHT: 66 IN | OXYGEN SATURATION: 99 % | WEIGHT: 203.5 LBS | BODY MASS INDEX: 32.71 KG/M2 | HEART RATE: 89 BPM | TEMPERATURE: 98.3 F | DIASTOLIC BLOOD PRESSURE: 80 MMHG | SYSTOLIC BLOOD PRESSURE: 138 MMHG

## 2019-03-28 DIAGNOSIS — N20.0 CALCULUS OF KIDNEY: ICD-10-CM

## 2019-03-28 PROCEDURE — 99213 OFFICE O/P EST LOW 20 MIN: CPT | Mod: 25

## 2019-03-28 PROCEDURE — 99497 ADVNCD CARE PLAN 30 MIN: CPT

## 2019-03-28 PROCEDURE — G0447 BEHAVIOR COUNSEL OBESITY 15M: CPT

## 2019-03-28 PROCEDURE — G0444 DEPRESSION SCREEN ANNUAL: CPT

## 2019-03-28 PROCEDURE — 99395 PREV VISIT EST AGE 18-39: CPT | Mod: 25

## 2019-03-28 RX ORDER — ALBUTEROL SULFATE 108 UG/1
108 (90 BASE) AEROSOL, METERED RESPIRATORY (INHALATION)
Qty: 1 | Refills: 3 | Status: ACTIVE | COMMUNITY
Start: 2019-03-28 | End: 1900-01-01

## 2019-03-28 NOTE — COUNSELING
[Weight management counseling provided] : Weight management [Healthy eating counseling provided] : healthy eating [Activity counseling provided] : activity [Good understanding] : Patient has a good understanding of disease, goals and obesity follow-up plan [Low Fat Diet] : Low fat diet [Low Salt Diet] : Low salt diet [___ min/wk activity recommended] : [unfilled] min/wk activity recommended [Walking] : Walking [None] : None [ - Behavioral Counseling for Obesity (Face-to-Face for 15 Minutes)] : Behavioral Counseling for Obesity (Face-to-Face for 15 Minutes) [ - Annual Depression Screening] : Annual Depression Screening

## 2019-03-28 NOTE — PLAN
[FreeTextEntry1] : In regards to patients Physical exam, routine blood work drawn, will review results with patient. \par \par Counseling included abnormal lab results, differential diagnoses, treatment options, risks and benefits, lifestyle changes, prognosis, current condition, medications, and dose adjustments. \par The patient was interactive, attentive, asked questions, and verbalized understanding

## 2019-03-28 NOTE — HEALTH RISK ASSESSMENT
[Very Good] : ~his/her~  mood as very good [] : No [No falls in past year] : Patient reported no falls in the past year [0] : 2) Feeling down, depressed, or hopeless: Not at all (0) [de-identified] : Dr. Eastman [[id number]] : 0 [Patient declined Low Dose CT Scan] : Patient declined Low Dose CT Scan [Patient declined Retinal Exam] : Patient declined Retinal Exam. [Patient declined mammogram] : Patient declined mammogram [Patient reported PAP Smear was normal] : Patient reported PAP Smear was normal [Patient declined bone density test] : Patient declined bone density test [Patient reported colonoscopy was normal] : Patient reported colonoscopy was normal [HIV test declined] : HIV test declined [Hepatitis C test declined] : Hepatitis C test declined [PapSmearDate] : 03/19 [ColonoscopyDate] : 03/13 [Reviewed updated] : Reviewed updated [Designated Healthcare Proxy] : Designated healthcare proxy [Name: ___] : Health Care Proxy's Name: [unfilled]  [Relationship: ___] : Relationship: [unfilled] [Aggressive treatment] : aggressive treatment [I will adhere to the patient's wishes as expressed in the advance directive except as noted below.] : I will adhere to the patient's wishes as expressed in the advance directive except as noted below [AdvancecareDate] : 03/19

## 2019-03-29 LAB
ALBUMIN SERPL ELPH-MCNC: 4.4 G/DL
ALP BLD-CCNC: 92 U/L
ALT SERPL-CCNC: 9 U/L
ANION GAP SERPL CALC-SCNC: 12 MMOL/L
AST SERPL-CCNC: 14 U/L
BASOPHILS # BLD AUTO: 0.1 K/UL
BASOPHILS NFR BLD AUTO: 0.9 %
BILIRUB SERPL-MCNC: 0.2 MG/DL
BUN SERPL-MCNC: 13 MG/DL
CALCIUM SERPL-MCNC: 10.1 MG/DL
CHLORIDE SERPL-SCNC: 103 MMOL/L
CHOLEST SERPL-MCNC: 139 MG/DL
CHOLEST/HDLC SERPL: 2.2 RATIO
CO2 SERPL-SCNC: 25 MMOL/L
CREAT SERPL-MCNC: 0.8 MG/DL
EOSINOPHIL # BLD AUTO: 0.3 K/UL
EOSINOPHIL NFR BLD AUTO: 2.6 %
ESTIMATED AVERAGE GLUCOSE: 97 MG/DL
GLUCOSE SERPL-MCNC: 99 MG/DL
HBA1C MFR BLD HPLC: 5 %
HCT VFR BLD CALC: 35.2 %
HDLC SERPL-MCNC: 62 MG/DL
HGB BLD-MCNC: 10.1 G/DL
LDLC SERPL CALC-MCNC: 56 MG/DL
LYMPHOCYTES # BLD AUTO: 2.19 K/UL
LYMPHOCYTES NFR BLD AUTO: 19.1 %
MAN DIFF?: NORMAL
MCHC RBC-ENTMCNC: 24 PG
MCHC RBC-ENTMCNC: 28.7 GM/DL
MCV RBC AUTO: 83.6 FL
MONOCYTES # BLD AUTO: 0.4 K/UL
MONOCYTES NFR BLD AUTO: 3.5 %
NEUTROPHILS # BLD AUTO: 8.46 K/UL
NEUTROPHILS NFR BLD AUTO: 73.9 %
PLATELET # BLD AUTO: 576 K/UL
POTASSIUM SERPL-SCNC: 4.7 MMOL/L
PROT SERPL-MCNC: 7.6 G/DL
RBC # BLD: 4.21 M/UL
RBC # FLD: 20.3 %
SODIUM SERPL-SCNC: 140 MMOL/L
TRIGL SERPL-MCNC: 107 MG/DL
TSH SERPL-ACNC: 1.44 UIU/ML
WBC # FLD AUTO: 11.45 K/UL

## 2019-06-27 ENCOUNTER — APPOINTMENT (OUTPATIENT)
Dept: UROLOGY | Facility: CLINIC | Age: 39
End: 2019-06-27

## 2019-07-03 ENCOUNTER — RESULT REVIEW (OUTPATIENT)
Age: 39
End: 2019-07-03

## 2019-07-05 NOTE — ED ADULT NURSE NOTE - TEMPLATE LIST FOR HEAD TO TOE ASSESSMENT
Detail Level: Detailed Total Volume (Ccs): 1 Size Of Lesion In Cm: 1.3 Bill For Surgical Tray: no What Was Performed First?: Curettage Bill As A Line Item Or As Units: Line Item General Biopsy Photograph Reviewed: Yes Post-Care Instructions: I reviewed with the patient in detail post-care instructions. Patient is to keep the area dry for 48 hours, and not to engage in any swimming until the area is healed. Should the patient develop any fevers, chills, bleeding, severe pain patient will contact the office immediately. Size Of Lesion After Curettage: 1.8 Cautery Type: electrocautery (heat cautery) Anesthesia Type: 1% lidocaine with epinephrine Consent was obtained from the patient. The risks, benefits and alternatives to therapy were discussed in detail. Specifically, the risks of infection, scarring, bleeding, prolonged wound healing, nerve injury, incomplete removal, allergy to anesthesia and recurrence were addressed. Alternatives to [facility], such as: surgical removal and XRT were also discussed. Prior to the procedure, the treatment site was clearly identified and confirmed by the patient. All components of Universal Protocol/PAUSE Rule completed. Size Of Lesion After Curettage: 2 Consent was obtained from the patient. The risks, benefits and alternatives to therapy were discussed in detail. Specifically, the risks of infection, scarring, bleeding, prolonged wound healing, nerve injury, incomplete removal, allergy to anesthesia and recurrence were addressed. Alternatives to ED&C, such as: surgical removal and XRT were also discussed.  Prior to the procedure, the treatment site was clearly identified and confirmed by the patient. All components of Universal Protocol/PAUSE Rule completed. Size Of Lesion In Cm: 0.6 Size Of Lesion After Curettage: 1.2

## 2019-12-05 ENCOUNTER — APPOINTMENT (OUTPATIENT)
Dept: INTERNAL MEDICINE | Facility: CLINIC | Age: 39
End: 2019-12-05
Payer: COMMERCIAL

## 2019-12-05 VITALS
DIASTOLIC BLOOD PRESSURE: 84 MMHG | HEART RATE: 103 BPM | BODY MASS INDEX: 32.62 KG/M2 | TEMPERATURE: 99.4 F | WEIGHT: 203 LBS | OXYGEN SATURATION: 98 % | SYSTOLIC BLOOD PRESSURE: 132 MMHG | HEIGHT: 66 IN

## 2019-12-05 DIAGNOSIS — G96.8 OTHER SPECIFIED DISORDERS OF CENTRAL NERVOUS SYSTEM: ICD-10-CM

## 2019-12-05 PROCEDURE — 99214 OFFICE O/P EST MOD 30 MIN: CPT

## 2019-12-05 NOTE — HISTORY OF PRESENT ILLNESS
[FreeTextEntry1] : sinus pressure  [de-identified] : Ms. ARIADNE BENNETT is a 39 year female with a PMH of Mild intermittent asthma comes to the office c/o nasal congestion, nasal discharge x 10 days, no other complaints at this time.

## 2019-12-05 NOTE — PLAN
[FreeTextEntry1] : Patient with sinusitis, will start antibiotics and intranasal steroid today. 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

## 2019-12-05 NOTE — PHYSICAL EXAM
[Well Developed] : well developed [Well Nourished] : well nourished [No Acute Distress] : no acute distress [Well-Appearing] : well-appearing [Normal Sclera/Conjunctiva] : normal sclera/conjunctiva [EOMI] : extraocular movements intact [Normal Outer Ear/Nose] : the outer ears and nose were normal in appearance [PERRL] : pupils equal round and reactive to light [No Lymphadenopathy] : no lymphadenopathy [No JVD] : no jugular venous distention [Supple] : supple [Thyroid Normal, No Nodules] : the thyroid was normal and there were no nodules present [No Accessory Muscle Use] : no accessory muscle use [No Respiratory Distress] : no respiratory distress  [Regular Rhythm] : with a regular rhythm [Normal Rate] : normal rate  [Normal S1, S2] : normal S1 and S2 [No Carotid Bruits] : no carotid bruits [No Murmur] : no murmur heard [No Varicosities] : no varicosities [No Abdominal Bruit] : a ~M bruit was not heard ~T in the abdomen [Pedal Pulses Present] : the pedal pulses are present [No Edema] : there was no peripheral edema [No Extremity Clubbing/Cyanosis] : no extremity clubbing/cyanosis [No Palpable Aorta] : no palpable aorta [Non Tender] : non-tender [Non-distended] : non-distended [Soft] : abdomen soft [No HSM] : no HSM [No Masses] : no abdominal mass palpated [Normal Bowel Sounds] : normal bowel sounds [Normal Anterior Cervical Nodes] : no anterior cervical lymphadenopathy [Normal Posterior Cervical Nodes] : no posterior cervical lymphadenopathy [No CVA Tenderness] : no CVA  tenderness [No Joint Swelling] : no joint swelling [Coordination Grossly Intact] : coordination grossly intact [Grossly Normal Strength/Tone] : grossly normal strength/tone [No Rash] : no rash [Normal Gait] : normal gait [No Focal Deficits] : no focal deficits [Normal Insight/Judgement] : insight and judgment were intact [de-identified] : fullness of TM bilaterally, cobblestoning of pharynx, tenderness with palpation of right frontal sinus [Normal Affect] : the affect was normal [de-identified] : spinal tenderness thoracic spine.  [de-identified] : minimal wheezing right lower lobe

## 2019-12-30 NOTE — PATIENT PROFILE ADULT - LAST BOWEL MOVEMENT DATE
Pt Ermelinda called from [facility].     Pt wanted to confirm appt. Seeing Dr. Garcia, PCP is Dr. Mayfield. Pt wants to address Amoxicillin allergy.     Informed pt that he can review with Dr. Garcia tomorrow.     Pt would like wheelchair assistance at appointment.    14-Mar-2019

## 2020-01-07 ENCOUNTER — OTHER (OUTPATIENT)
Age: 40
End: 2020-01-07

## 2020-01-10 ENCOUNTER — APPOINTMENT (OUTPATIENT)
Dept: INTERNAL MEDICINE | Facility: CLINIC | Age: 40
End: 2020-01-10
Payer: COMMERCIAL

## 2020-01-10 VITALS
WEIGHT: 203 LBS | HEART RATE: 117 BPM | SYSTOLIC BLOOD PRESSURE: 130 MMHG | BODY MASS INDEX: 32.62 KG/M2 | TEMPERATURE: 98.8 F | DIASTOLIC BLOOD PRESSURE: 74 MMHG | OXYGEN SATURATION: 97 % | HEIGHT: 66 IN

## 2020-01-10 DIAGNOSIS — J45.20 MILD INTERMITTENT ASTHMA, UNCOMPLICATED: ICD-10-CM

## 2020-01-10 PROCEDURE — 99214 OFFICE O/P EST MOD 30 MIN: CPT

## 2020-01-10 NOTE — PHYSICAL EXAM
[No Acute Distress] : no acute distress [Well Nourished] : well nourished [Well Developed] : well developed [Well-Appearing] : well-appearing [PERRL] : pupils equal round and reactive to light [Normal Sclera/Conjunctiva] : normal sclera/conjunctiva [EOMI] : extraocular movements intact [Normal Outer Ear/Nose] : the outer ears and nose were normal in appearance [No JVD] : no jugular venous distention [Normal Oropharynx] : the oropharynx was normal [Supple] : supple [No Lymphadenopathy] : no lymphadenopathy [No Respiratory Distress] : no respiratory distress  [Thyroid Normal, No Nodules] : the thyroid was normal and there were no nodules present [No Accessory Muscle Use] : no accessory muscle use [Normal Rate] : normal rate  [Regular Rhythm] : with a regular rhythm [Normal S1, S2] : normal S1 and S2 [No Abdominal Bruit] : a ~M bruit was not heard ~T in the abdomen [No Carotid Bruits] : no carotid bruits [No Murmur] : no murmur heard [No Varicosities] : no varicosities [Pedal Pulses Present] : the pedal pulses are present [No Palpable Aorta] : no palpable aorta [No Edema] : there was no peripheral edema [No Extremity Clubbing/Cyanosis] : no extremity clubbing/cyanosis [Soft] : abdomen soft [Non Tender] : non-tender [No Masses] : no abdominal mass palpated [No HSM] : no HSM [Non-distended] : non-distended [Normal Bowel Sounds] : normal bowel sounds [Normal Posterior Cervical Nodes] : no posterior cervical lymphadenopathy [Normal Anterior Cervical Nodes] : no anterior cervical lymphadenopathy [No CVA Tenderness] : no CVA  tenderness [No Spinal Tenderness] : no spinal tenderness [No Joint Swelling] : no joint swelling [No Rash] : no rash [Grossly Normal Strength/Tone] : grossly normal strength/tone [No Focal Deficits] : no focal deficits [Coordination Grossly Intact] : coordination grossly intact [Normal Gait] : normal gait [Normal Insight/Judgement] : insight and judgment were intact [Normal Affect] : the affect was normal [de-identified] : diffuse wheezing.

## 2020-01-10 NOTE — HISTORY OF PRESENT ILLNESS
[FreeTextEntry1] : sob / cough [de-identified] : Ms. ARIADNE BENNETT is a 39 year female with a PMH of Mild intermittent asthma comes to the office c/o cough and wheezing getting progressively worse over the past 5 days, No other complaints at this time.

## 2020-01-10 NOTE — PLAN
[FreeTextEntry1] : Patient likely has Atypical PNA will give antibiotics steroids and inhaler. \par \par Counseling included abnormal lab results, differential diagnoses, treatment options, risks and benefits, lifestyle changes, prognosis, current condition, medications, and dose adjustments. \par The patient was interactive, attentive, asked questions, and verbalized understanding

## 2020-02-15 NOTE — ED ADULT TRIAGE NOTE - HEIGHT IN FEET
Prep Survey      Responses   Facility patient discharged from?  [facility]   Is patient eligible?  No   What are the reasons patient is not eligible?  Other   Does the patient have one of the following disease processes/diagnoses(primary or secondary)?  Other [Alcohol withdrawal syndrome]   Prep survey completed?  Yes          Megan Tee RN         5

## 2020-04-16 ENCOUNTER — APPOINTMENT (OUTPATIENT)
Dept: INTERNAL MEDICINE | Facility: CLINIC | Age: 40
End: 2020-04-16
Payer: COMMERCIAL

## 2020-04-16 PROCEDURE — 99213 OFFICE O/P EST LOW 20 MIN: CPT | Mod: 95

## 2020-04-16 NOTE — HISTORY OF PRESENT ILLNESS
[Home] : at home, [unfilled] , at the time of the visit. [Other Location: e.g. Home (Enter Location, City,State)___] : at [unfilled] [Patient] : the patient [Self] : self [FreeTextEntry8] : Ms. ARIADNE DESOUZA is a 39 year female calls the office c/o sinus pressure, green nasal discharge x 10 days. Patient denies fever, cough, SOB. No other complaints at this time.

## 2020-04-16 NOTE — PLAN
[FreeTextEntry1] : Patient with sinusitis, will start antibiotics and intranasal steroid today.\par \par Patient was educated on proper hand washing technique and encouraged social distancing in order to reduce the risk of the spread of COVID 19. Patient advised to seek medical attention if they develop FEVER or SOB.\par \par During conversation with patient extensive medical records were reviewed including but not limited to, Hospital records records, out patient records, laboratory data and microbiology data \par In addition extensive time was also spent in reviewing diagnostic studies.\par \par Total encounter total time 15 mins\par >50% of time spent counseling/coordinating care

## 2020-04-16 NOTE — PHYSICAL EXAM
[No Acute Distress] : no acute distress [Well Nourished] : well nourished [Well Developed] : well developed [Well-Appearing] : well-appearing [Normal Sclera/Conjunctiva] : normal sclera/conjunctiva [EOMI] : extraocular movements intact [Normal Outer Ear/Nose] : the outer ears and nose were normal in appearance [No Respiratory Distress] : no respiratory distress  [No Rash] : no rash [Coordination Grossly Intact] : coordination grossly intact [Normal Affect] : the affect was normal [Normal Insight/Judgement] : insight and judgment were intact

## 2020-04-23 ENCOUNTER — APPOINTMENT (OUTPATIENT)
Dept: INTERNAL MEDICINE | Facility: CLINIC | Age: 40
End: 2020-04-23
Payer: COMMERCIAL

## 2020-04-23 DIAGNOSIS — Z87.09 PERSONAL HISTORY OF OTHER DISEASES OF THE RESPIRATORY SYSTEM: ICD-10-CM

## 2020-04-23 PROCEDURE — 99213 OFFICE O/P EST LOW 20 MIN: CPT | Mod: 95

## 2020-04-23 NOTE — PLAN
[FreeTextEntry1] : In regards to patient's Bronchitis, will prescribe cough suppressant, start patient on oral steroids and patient will continue to use Rescue inhaler PRN for SOB or wheezing. \par \par Will obtain CXR and call patient with results\par \par In regards to patient;s flank pain, given patient's history of Kidney stones will obtain renal US and will call patient with results\par \par Patient was educated on proper hand washing technique and encouraged social distancing in order to reduce the risk of the spread of COVID 19. Patient advised to seek medical attention if they develop FEVER or SOB.\par \par During conversation with patient extensive medical records were reviewed including but not limited to, Hospital records records, out patient records, laboratory data and microbiology data \par In addition extensive time was also spent in reviewing diagnostic studies.\par \par Total encounter total time 15 mins\par >50% of time spent counseling/coordinating care\par

## 2020-04-23 NOTE — HISTORY OF PRESENT ILLNESS
[Home] : at home, [unfilled] , at the time of the visit. [Medical Office: ([facility])___] : at the medical office located in  [Patient] : the patient [Self] : self [FreeTextEntry8] : Ms. ARIADNE DESOUZA is a 39 year female with a PMH of asthma c/o several episodes of coughing up small amounts of bright red blood, started 4 days ago, and has occurred 3-4 times since then. Patient also c/o right flank pain for the past 3 days. Patient denies fever,  SOB. No other complaints at this time. \par \par

## 2020-04-24 ENCOUNTER — APPOINTMENT (OUTPATIENT)
Dept: ULTRASOUND IMAGING | Facility: CLINIC | Age: 40
End: 2020-04-24
Payer: COMMERCIAL

## 2020-04-24 ENCOUNTER — OUTPATIENT (OUTPATIENT)
Dept: OUTPATIENT SERVICES | Facility: HOSPITAL | Age: 40
LOS: 1 days | End: 2020-04-24
Payer: COMMERCIAL

## 2020-04-24 ENCOUNTER — APPOINTMENT (OUTPATIENT)
Dept: RADIOLOGY | Facility: CLINIC | Age: 40
End: 2020-04-24
Payer: COMMERCIAL

## 2020-04-24 DIAGNOSIS — J20.9 ACUTE BRONCHITIS, UNSPECIFIED: ICD-10-CM

## 2020-04-24 DIAGNOSIS — Z98.89 OTHER SPECIFIED POSTPROCEDURAL STATES: Chronic | ICD-10-CM

## 2020-04-24 DIAGNOSIS — R10.9 UNSPECIFIED ABDOMINAL PAIN: ICD-10-CM

## 2020-04-24 DIAGNOSIS — N20.0 CALCULUS OF KIDNEY: Chronic | ICD-10-CM

## 2020-04-24 PROCEDURE — 71046 X-RAY EXAM CHEST 2 VIEWS: CPT | Mod: 26

## 2020-04-24 PROCEDURE — 71046 X-RAY EXAM CHEST 2 VIEWS: CPT

## 2020-04-24 PROCEDURE — 76775 US EXAM ABDO BACK WALL LIM: CPT

## 2020-04-24 PROCEDURE — 76775 US EXAM ABDO BACK WALL LIM: CPT | Mod: 26

## 2020-05-08 ENCOUNTER — RX CHANGE (OUTPATIENT)
Age: 40
End: 2020-05-08

## 2020-07-14 NOTE — H&P PST ADULT - CENTRAL VENOUS CATHETER
requesting medication refill.  Please approve or deny this request.    Rx requested:  Requested Prescriptions     Pending Prescriptions Disp Refills    pravastatin (PRAVACHOL) 40 MG tablet [Pharmacy Med Name: PRAVASTATIN 40MG TABS TABLET] 90 tablet 3     Sig: TAKE 1 TABLET BY MOUTH NIGHTLY         Last Office Visit:   1/7/2020      Next Visit Date:  Future Appointments   Date Time Provider Ginna Barnett   7/16/2020  9:30 AM Carter Abebe, DO Yolanda Andino
no

## 2020-08-17 NOTE — ED ADULT NURSE REASSESSMENT NOTE - GASTROINTESTINAL WDL
6 minutes walk/oxygen titration study performed today  Resting room air saturation:  92%  Exercise saturation without supplemental oxygen:  88%  Exercise saturation with supplemental oxygen:  3 liters/minute, 91-93%  Impression:  Patient requires 3 L supplemental oxygen with exertion  Abdomen soft, nontender, nondistended, bowel sounds present in all 4 quadrants.

## 2020-08-31 ENCOUNTER — APPOINTMENT (OUTPATIENT)
Dept: INTERNAL MEDICINE | Facility: CLINIC | Age: 40
End: 2020-08-31
Payer: COMMERCIAL

## 2020-08-31 PROCEDURE — 99213 OFFICE O/P EST LOW 20 MIN: CPT | Mod: 95

## 2020-08-31 RX ORDER — PREDNISONE 20 MG/1
20 TABLET ORAL DAILY
Qty: 10 | Refills: 0 | Status: DISCONTINUED | COMMUNITY
Start: 2019-12-05 | End: 2020-08-31

## 2020-08-31 RX ORDER — CLINDAMYCIN HYDROCHLORIDE 300 MG/1
300 CAPSULE ORAL 3 TIMES DAILY
Qty: 42 | Refills: 0 | Status: DISCONTINUED | COMMUNITY
Start: 2020-04-16 | End: 2020-08-31

## 2020-08-31 RX ORDER — BROMPHENIRAMINE MALEATE, PSEUDOEPHEDRINE HYDROCHLORIDE AND DEXTROMETHORPHAN HYDROBROMIDE 2; 30; 10 MG/5ML; MG/5ML; MG/5ML
30-2-10 SYRUP ORAL
Qty: 1 | Refills: 0 | Status: DISCONTINUED | COMMUNITY
Start: 2020-01-10 | End: 2020-08-31

## 2020-08-31 RX ORDER — AZITHROMYCIN 250 MG/1
250 TABLET, FILM COATED ORAL
Qty: 1 | Refills: 0 | Status: DISCONTINUED | COMMUNITY
Start: 2020-01-10 | End: 2020-08-31

## 2020-08-31 RX ORDER — AZELASTINE HYDROCHLORIDE 137 UG/1
137 SPRAY, METERED NASAL TWICE DAILY
Qty: 1 | Refills: 0 | Status: DISCONTINUED | COMMUNITY
Start: 2019-12-05 | End: 2020-08-31

## 2020-08-31 RX ORDER — CLINDAMYCIN HYDROCHLORIDE 300 MG/1
300 CAPSULE ORAL 3 TIMES DAILY
Qty: 42 | Refills: 0 | Status: DISCONTINUED | COMMUNITY
Start: 2019-12-05 | End: 2020-08-31

## 2020-08-31 RX ORDER — PREDNISONE 20 MG/1
20 TABLET ORAL DAILY
Qty: 10 | Refills: 0 | Status: DISCONTINUED | COMMUNITY
Start: 2020-04-23 | End: 2020-08-31

## 2020-08-31 RX ORDER — BROMPHENIRAMINE MALEATE, PSEUDOEPHEDRINE HYDROCHLORIDE AND DEXTROMETHORPHAN HYDROBROMIDE 2; 30; 10 MG/5ML; MG/5ML; MG/5ML
30-2-10 SYRUP ORAL
Qty: 1 | Refills: 0 | Status: DISCONTINUED | COMMUNITY
Start: 2020-04-23 | End: 2020-08-31

## 2020-08-31 NOTE — REVIEW OF SYSTEMS
[Shortness Of Breath] : no shortness of breath [Wheezing] : no wheezing [Cough] : cough [Dyspnea on Exertion] : no dyspnea on exertion [Negative] : Psychiatric

## 2020-08-31 NOTE — PLAN
[FreeTextEntry1] : Patient with sinusitis, will start antibiotics and intranasal steroid today.\par \par In regards to patient's asthma, patient will continue medication.\par \par During conversation with patient extensive medical records were reviewed including but not limited to, Hospital records records, out patient records, laboratory data and microbiology data \par In addition extensive time was also spent in reviewing diagnostic studies.\par \par Total encounter total time 15 mins\par >50% of time spent counseling/coordinating care\par

## 2020-08-31 NOTE — HISTORY OF PRESENT ILLNESS
[Home] : at home, [unfilled] , at the time of the visit. [Medical Office: ([facility])___] : at the medical office located in  [Verbal consent obtained from patient] : the patient, [unfilled] [FreeTextEntry8] : Ms. ARIADNE DESOUZA is a 40 year female with a PMH of asthma c/o  sinus congestion and green nasal discharge x 10 days. Patient denies fever, cough SOB. No other complaints at this time. \par \par \par

## 2020-08-31 NOTE — PHYSICAL EXAM
[No Acute Distress] : no acute distress [Well Developed] : well developed [Well Nourished] : well nourished [Normal Sclera/Conjunctiva] : normal sclera/conjunctiva [Well-Appearing] : well-appearing [EOMI] : extraocular movements intact [Normal Outer Ear/Nose] : the outer ears and nose were normal in appearance [No Respiratory Distress] : no respiratory distress  [No Rash] : no rash [Normal Affect] : the affect was normal [Coordination Grossly Intact] : coordination grossly intact [Normal Insight/Judgement] : insight and judgment were intact

## 2020-09-05 ENCOUNTER — EMERGENCY (EMERGENCY)
Facility: HOSPITAL | Age: 40
LOS: 1 days | Discharge: HOME | End: 2020-09-05
Attending: EMERGENCY MEDICINE
Payer: COMMERCIAL

## 2020-09-05 VITALS
RESPIRATION RATE: 17 BRPM | DIASTOLIC BLOOD PRESSURE: 88 MMHG | OXYGEN SATURATION: 100 % | TEMPERATURE: 98 F | HEART RATE: 71 BPM | SYSTOLIC BLOOD PRESSURE: 144 MMHG

## 2020-09-05 VITALS — WEIGHT: 199.96 LBS

## 2020-09-05 DIAGNOSIS — R00.2 PALPITATIONS: ICD-10-CM

## 2020-09-05 DIAGNOSIS — N20.0 CALCULUS OF KIDNEY: Chronic | ICD-10-CM

## 2020-09-05 DIAGNOSIS — K20.8 OTHER ESOPHAGITIS: ICD-10-CM

## 2020-09-05 DIAGNOSIS — K58.9 IRRITABLE BOWEL SYNDROME WITHOUT DIARRHEA: ICD-10-CM

## 2020-09-05 DIAGNOSIS — Z88.0 ALLERGY STATUS TO PENICILLIN: ICD-10-CM

## 2020-09-05 DIAGNOSIS — E03.9 HYPOTHYROIDISM, UNSPECIFIED: ICD-10-CM

## 2020-09-05 DIAGNOSIS — N83.209 UNSPECIFIED OVARIAN CYST, UNSPECIFIED SIDE: ICD-10-CM

## 2020-09-05 DIAGNOSIS — R11.0 NAUSEA: ICD-10-CM

## 2020-09-05 DIAGNOSIS — Z88.1 ALLERGY STATUS TO OTHER ANTIBIOTIC AGENTS STATUS: ICD-10-CM

## 2020-09-05 DIAGNOSIS — Z87.442 PERSONAL HISTORY OF URINARY CALCULI: ICD-10-CM

## 2020-09-05 DIAGNOSIS — J45.909 UNSPECIFIED ASTHMA, UNCOMPLICATED: ICD-10-CM

## 2020-09-05 DIAGNOSIS — R07.9 CHEST PAIN, UNSPECIFIED: ICD-10-CM

## 2020-09-05 DIAGNOSIS — Z98.89 OTHER SPECIFIED POSTPROCEDURAL STATES: Chronic | ICD-10-CM

## 2020-09-05 DIAGNOSIS — K21.9 GASTRO-ESOPHAGEAL REFLUX DISEASE WITHOUT ESOPHAGITIS: ICD-10-CM

## 2020-09-05 DIAGNOSIS — Z90.49 ACQUIRED ABSENCE OF OTHER SPECIFIED PARTS OF DIGESTIVE TRACT: ICD-10-CM

## 2020-09-05 LAB
ALBUMIN SERPL ELPH-MCNC: 3.7 G/DL — SIGNIFICANT CHANGE UP (ref 3.3–5)
ALP SERPL-CCNC: 78 U/L — SIGNIFICANT CHANGE UP (ref 40–120)
ALT FLD-CCNC: 21 U/L — SIGNIFICANT CHANGE UP (ref 12–78)
ANION GAP SERPL CALC-SCNC: 6 MMOL/L — SIGNIFICANT CHANGE UP (ref 5–17)
APTT BLD: 29.5 SEC — SIGNIFICANT CHANGE UP (ref 27.5–35.5)
AST SERPL-CCNC: 25 U/L — SIGNIFICANT CHANGE UP (ref 15–37)
BASOPHILS # BLD AUTO: 0.07 K/UL — SIGNIFICANT CHANGE UP (ref 0–0.2)
BASOPHILS NFR BLD AUTO: 0.6 % — SIGNIFICANT CHANGE UP (ref 0–2)
BILIRUB SERPL-MCNC: 0.5 MG/DL — SIGNIFICANT CHANGE UP (ref 0.2–1.2)
BUN SERPL-MCNC: 17 MG/DL — SIGNIFICANT CHANGE UP (ref 7–23)
CALCIUM SERPL-MCNC: 9.5 MG/DL — SIGNIFICANT CHANGE UP (ref 8.5–10.1)
CHLORIDE SERPL-SCNC: 110 MMOL/L — HIGH (ref 96–108)
CO2 SERPL-SCNC: 25 MMOL/L — SIGNIFICANT CHANGE UP (ref 22–31)
CREAT SERPL-MCNC: 0.9 MG/DL — SIGNIFICANT CHANGE UP (ref 0.5–1.3)
EOSINOPHIL # BLD AUTO: 0.14 K/UL — SIGNIFICANT CHANGE UP (ref 0–0.5)
EOSINOPHIL NFR BLD AUTO: 1.2 % — SIGNIFICANT CHANGE UP (ref 0–6)
GLUCOSE SERPL-MCNC: 95 MG/DL — SIGNIFICANT CHANGE UP (ref 70–99)
HCT VFR BLD CALC: 33.9 % — LOW (ref 34.5–45)
HGB BLD-MCNC: 10.6 G/DL — LOW (ref 11.5–15.5)
IMM GRANULOCYTES NFR BLD AUTO: 0.5 % — SIGNIFICANT CHANGE UP (ref 0–1.5)
INR BLD: 1.03 RATIO — SIGNIFICANT CHANGE UP (ref 0.88–1.16)
LYMPHOCYTES # BLD AUTO: 26.7 % — SIGNIFICANT CHANGE UP (ref 13–44)
LYMPHOCYTES # BLD AUTO: 3.05 K/UL — SIGNIFICANT CHANGE UP (ref 1–3.3)
MCHC RBC-ENTMCNC: 24.4 PG — LOW (ref 27–34)
MCHC RBC-ENTMCNC: 31.3 GM/DL — LOW (ref 32–36)
MCV RBC AUTO: 78.1 FL — LOW (ref 80–100)
MONOCYTES # BLD AUTO: 0.63 K/UL — SIGNIFICANT CHANGE UP (ref 0–0.9)
MONOCYTES NFR BLD AUTO: 5.5 % — SIGNIFICANT CHANGE UP (ref 2–14)
NEUTROPHILS # BLD AUTO: 7.47 K/UL — HIGH (ref 1.8–7.4)
NEUTROPHILS NFR BLD AUTO: 65.5 % — SIGNIFICANT CHANGE UP (ref 43–77)
PLATELET # BLD AUTO: 488 K/UL — HIGH (ref 150–400)
POTASSIUM SERPL-MCNC: 4.1 MMOL/L — SIGNIFICANT CHANGE UP (ref 3.5–5.3)
POTASSIUM SERPL-SCNC: 4.1 MMOL/L — SIGNIFICANT CHANGE UP (ref 3.5–5.3)
PROT SERPL-MCNC: 8.3 GM/DL — SIGNIFICANT CHANGE UP (ref 6–8.3)
PROTHROM AB SERPL-ACNC: 12 SEC — SIGNIFICANT CHANGE UP (ref 10.6–13.6)
RBC # BLD: 4.34 M/UL — SIGNIFICANT CHANGE UP (ref 3.8–5.2)
RBC # FLD: 17.2 % — HIGH (ref 10.3–14.5)
SODIUM SERPL-SCNC: 141 MMOL/L — SIGNIFICANT CHANGE UP (ref 135–145)
TROPONIN I SERPL-MCNC: <0.015 NG/ML — SIGNIFICANT CHANGE UP (ref 0.01–0.04)
WBC # BLD: 11.42 K/UL — HIGH (ref 3.8–10.5)
WBC # FLD AUTO: 11.42 K/UL — HIGH (ref 3.8–10.5)

## 2020-09-05 PROCEDURE — 99284 EMERGENCY DEPT VISIT MOD MDM: CPT | Mod: 25

## 2020-09-05 PROCEDURE — 71046 X-RAY EXAM CHEST 2 VIEWS: CPT | Mod: 26

## 2020-09-05 PROCEDURE — 80053 COMPREHEN METABOLIC PANEL: CPT

## 2020-09-05 PROCEDURE — 36415 COLL VENOUS BLD VENIPUNCTURE: CPT

## 2020-09-05 PROCEDURE — 85025 COMPLETE CBC W/AUTO DIFF WBC: CPT

## 2020-09-05 PROCEDURE — 71046 X-RAY EXAM CHEST 2 VIEWS: CPT

## 2020-09-05 PROCEDURE — 99284 EMERGENCY DEPT VISIT MOD MDM: CPT

## 2020-09-05 PROCEDURE — 85610 PROTHROMBIN TIME: CPT

## 2020-09-05 PROCEDURE — 85730 THROMBOPLASTIN TIME PARTIAL: CPT

## 2020-09-05 PROCEDURE — 84484 ASSAY OF TROPONIN QUANT: CPT

## 2020-09-05 PROCEDURE — 93005 ELECTROCARDIOGRAM TRACING: CPT

## 2020-09-05 PROCEDURE — 93010 ELECTROCARDIOGRAM REPORT: CPT

## 2020-09-05 RX ORDER — LIDOCAINE 4 G/100G
5 CREAM TOPICAL ONCE
Refills: 0 | Status: COMPLETED | OUTPATIENT
Start: 2020-09-05 | End: 2020-09-05

## 2020-09-05 RX ORDER — LIDOCAINE 4 G/100G
10 CREAM TOPICAL
Qty: 150 | Refills: 0
Start: 2020-09-05

## 2020-09-05 RX ORDER — FAMOTIDINE 10 MG/ML
20 INJECTION INTRAVENOUS ONCE
Refills: 0 | Status: DISCONTINUED | OUTPATIENT
Start: 2020-09-05 | End: 2020-09-05

## 2020-09-05 RX ADMIN — Medication 30 MILLILITER(S): at 19:56

## 2020-09-05 RX ADMIN — LIDOCAINE 5 MILLILITER(S): 4 CREAM TOPICAL at 19:55

## 2020-09-05 NOTE — ED STATDOCS - OBJECTIVE STATEMENT
41 y/o F with PMHx of kidney stone s/p lithotripsy, hypothyroid, asthma, GERD, IBS, s/p cholecystectomy, and s/p  presents to the ED c/o +chest pain since yesterday. Endorses associated +lightheadedness, +palpitations, and +nausea. No fever, SOB, or weakness. Currently on clindamycin for sinus infection. Denies cardiac hx. Never smoker. Allergic to doxycycline, Levaquin, and penicillin. PCP: Dr. Vladimir Campos. 39 y/o F with PMHx of kidney stone s/p lithotripsy, hypothyroid, asthma, GERD, IBS, ovarian cyst, s/p cholecystectomy, and s/p  presents to the ED c/o +chest pain since yesterday. Endorses associated +lightheadedness, +palpitations, and +nausea. No fever, SOB, or weakness. Currently on clindamycin for sinus infection. Denies cardiac hx. Never smoker. Allergic to doxycycline, Levaquin, and penicillin. PCP: Dr. Vladimir Campos.

## 2020-09-05 NOTE — ED STATDOCS - PATIENT PORTAL LINK FT
You can access the FollowMyHealth Patient Portal offered by [facility] by registering at the following website: [email or web address]. By joining [employer]’s FollowMyHealth portal, you will also be able to view your health information using other applications (apps) compatible with our system.

## 2020-09-05 NOTE — ED STATDOCS - PROGRESS NOTE DETAILS
41 y/o F with PMH of kidney stones, GERD, IBS presents with chest pain x 2 days. States symptoms started after taking clindamycin tablet which she is taking to treat sinus infection. 41 y/o F with PMH of kidney stones, GERD, IBS presents with chest pain x 2 days. States symptoms started after taking clindamycin tablet which she is taking to treat sinus infection. Reports associated difficulty swallowing, palpitations, lightheadedness. Denies FMH of cardiac disease. Non smoker. PE: Well appearing. Cardiac: s1s2, RRR. lungs: CTAB. Abdomen: NBS x4, soft, nontender. A/P: r/o ACS, symptoms likely GI in etiology. Plan for EKG, labs, CXR, GI cocktail, reassess. - Carlitos Quiñones PA-C Labs unremarkable. Advised to continue with viscous lidocaine and maalox at home. If symptoms do not improve advised GI follow up. - Carlitos Quiñones PA-C

## 2020-09-05 NOTE — ED STATDOCS - NORMAL, MLM
no lesions,  no deformities,  no traumatic injuries,  no significant scars are present,  chest wall non-tender,  no masses present,  tactile fremitus is normal, breathing is unlabored without accessory muscle use,normal breath sounds
dell all pertinent systems normal

## 2020-09-05 NOTE — ED ADULT NURSE NOTE - OBJECTIVE STATEMENT
Pt states since this morning she has been having chest pain/throat pain when she swallows. Pt states she has been feeling off and though she was having a heart attack. Pt states she feels intermittently dizzy, SOB, and +palpitations. Pt states +nausea but denies v/d/f/c.

## 2020-09-05 NOTE — ED STATDOCS - CLINICAL SUMMARY MEDICAL DECISION MAKING FREE TEXT BOX
EKG unremarkable.  CXR clear.  Troponin WNL. Likely pill esophagitis given hx.  D/c home in good condition f/u with GI.

## 2020-09-05 NOTE — ED STATDOCS - CARE PROVIDER_API CALL
Abran Hogue (MD)  Gastroenterology; Internal Medicine  [address] [facility], [place] [address]  Phone: [phone]  Fax: [phone]  Follow Up Time:

## 2020-09-05 NOTE — ED STATDOCS - NSFOLLOWUPINSTRUCTIONS_ED_ALL_ED_FT
CONTINUE WITH LIDOCAINE AND MAALOX. IF SYMPTOMS DO NOT IMPROVE FOLLOW UP WITH GI FOR FURTHER EVALUATION.       Esophagitis     Esophagitis is inflammation of the esophagus. The esophagus is the tube that carries food from your mouth to your stomach. Esophagitis can cause soreness or pain in the esophagus. This condition can make it difficult and painful to swallow.  What are the causes?  Most causes of esophagitis are not serious. Common causes of this condition include:  Gastroesophageal reflux disease (GERD). This is when stomach contents move back up into the esophagus (reflux).Repeated vomiting.An allergic reaction, especially caused by food allergies (eosinophilic esophagitis).Injury to the esophagus by swallowing large pills with or without water, or swallowing certain types of medicines.Swallowing (ingesting) harmful chemicals, such as household cleaning products.Heavy alcohol use.An infection of the esophagus. This most often occurs in people who have a weakened immune system.Radiation or chemotherapy treatment for cancer.Certain diseases such as sarcoidosis, Crohn's disease, and scleroderma.What are the signs or symptoms?  Symptoms of this condition include:  Difficult or painful swallowing.Pain with swallowing acidic liquids, such as citrus juices.Pain with burping.Chest pain.Difficulty breathing.Nausea.Vomiting.Pain in the abdomen.Weight loss.Ulcers in the mouth.Patches of white material in the mouth (candidiasis).Fever.Coughing up blood or vomiting blood.Stool that is black, tarry, or bright red.How is this diagnosed?  Your health care provider will take a medical history and perform a physical exam. You may also have other tests, including:  An endoscopy to examine your esophagus and stomach with a small flexible tube with a camera.A test that measures the acidity level in your esophagus.A test that measures how much pressure is on your esophagus.A barium swallow or modified barium swallow to show the shape, size, and functioning of your esophagus.Allergy tests.How is this treated?  Treatment for this condition depends on the cause of your esophagitis. In some cases, steroids or other medicines may be given to help relieve your symptoms or to treat the underlying cause of your condition. You may have to make some lifestyle changes, such as:  Avoiding alcohol.Quitting smoking.Changing your diet.Exercising.Changing your sleep habits and your sleep environment.Follow these instructions at home:  Medicines     Take over-the-counter and prescription medicines only as told by your health care provider. Do not take aspirin, ibuprofen, or other NSAIDs unless your health care provider told you to do so.If you have trouble taking pills:  Use a pill splitter to decrease the size of the pill. This will decrease the chance of the pill getting stuck or injuring your esophagus. Drink water after you take a pill.Eating and drinking        Avoid foods and drinks that seem to make your symptoms worse.Follow a diet as recommended by your health care provider. This may involve avoiding foods and drinks such as:  Coffee and tea (with or without caffeine).Drinks that contain alcohol.Energy drinks and sports drinks.Carbonated drinks or sodas.Chocolate and cocoa.Peppermint and mint flavorings.Garlic and onions.Horseradish.Spicy and acidic foods, including peppers, chili powder, gutierrez powder, vinegar, hot sauces, and barbecue sauce.Citrus fruit juices and citrus fruits, such as oranges, alicia, and limes.Tomato-based foods, such as red sauce, chili, salsa, and pizza with red sauce.Fried and fatty foods, such as donuts, french fries, potato chips, and high-fat dressings.High-fat meats, such as hot dogs and fatty cuts of red and white meats, such as rib eye steak, sausage, ham, and felix.High-fat dairy items, such as whole milk, butter, and cream cheese.Lifestyle     Eat small, frequent meals instead of large meals.Avoid drinking large amounts of liquid with your meals.Avoid eating meals during the 2–3 hours before bedtime.Avoid lying down right after you eat.Do not exercise right after you eat.Do not use any products that contain nicotine or tobacco, such as cigarettes and e-cigarettes. If you need help quitting, ask your health care provider.General instructions        Pay attention to any changes in your symptoms. Let your health care provider know about them.Wear loose-fitting clothing. Do not wear anything tight around your waist that causes pressure on your abdomen.Raise (elevate) the head of your bed about 6 inches (15 cm).Try relaxation strategies such as yoga, deep breathing, or meditation to manage stress. If you need help reducing stress, ask your health care provider.If you are overweight, reduce your weight to an amount that is healthy for you. Ask your health care provider for guidance about a safe weight loss goal.Keep all follow-up visits as told by your health care provider. This is important.Contact a health care provider if:  You have new symptoms.You have unexplained weight loss.You have difficulty swallowing, or it hurts to swallow.You have wheezing or a cough that does not go away.Your symptoms do not improve with treatment.You have frequent heartburn for more than two weeks.Get help right away if:  You have severe pain in your arms, neck, jaw, teeth, or back.You feel sweaty, dizzy, or light-headed.You have chest pain or shortness of breath.You vomit and your vomit looks like blood or coffee grounds.Your stool is bloody or black.You have a fever.You cannot swallow, drink, or eat.Summary  Esophagitis is inflammation of the esophagus.Most causes of esophagitis are not serious.Follow your health care provider's instructions about eating and drinking. Follow instructions on medicines.Contact a health care provider if you have new symptoms, have weight loss, or coughing that does not stop.Get help right away if you have severe pain in the arms, neck, jaw, teeth or back, or if you have chest pain, shortness of breath, or fever.This information is not intended to replace advice given to you by your health care provider. Make sure you discuss any questions you have with your health care provider.

## 2020-09-05 NOTE — ED STATDOCS - PMH
Asthma  Last asthma attack was years ago  Cervical incompetence    Cholelithiasis    GERD (gastroesophageal reflux disease)    Hydronephrosis    Hypothyroidism    IBS (irritable bowel syndrome)    Renal calculus Asthma  Last asthma attack was years ago  Cervical incompetence    Cholelithiasis    GERD (gastroesophageal reflux disease)    Hydronephrosis    Hypothyroidism    IBS (irritable bowel syndrome)    Ovarian cyst    Renal calculus

## 2020-09-29 ENCOUNTER — APPOINTMENT (OUTPATIENT)
Dept: INTERNAL MEDICINE | Facility: CLINIC | Age: 40
End: 2020-09-29
Payer: COMMERCIAL

## 2020-09-29 PROBLEM — K21.9 GASTRO-ESOPHAGEAL REFLUX DISEASE WITHOUT ESOPHAGITIS: Chronic | Status: ACTIVE | Noted: 2020-09-06

## 2020-09-29 PROBLEM — N13.30 UNSPECIFIED HYDRONEPHROSIS: Chronic | Status: ACTIVE | Noted: 2020-09-06

## 2020-09-29 PROBLEM — K58.9 IRRITABLE BOWEL SYNDROME WITHOUT DIARRHEA: Chronic | Status: ACTIVE | Noted: 2020-09-06

## 2020-09-29 PROBLEM — N83.209 UNSPECIFIED OVARIAN CYST, UNSPECIFIED SIDE: Chronic | Status: ACTIVE | Noted: 2020-09-06

## 2020-09-29 PROBLEM — K58.9 IRRITABLE BOWEL SYNDROME, UNSPECIFIED: Chronic | Status: ACTIVE | Noted: 2020-09-06

## 2020-09-29 PROCEDURE — 99442: CPT

## 2020-10-20 ENCOUNTER — APPOINTMENT (OUTPATIENT)
Dept: INTERNAL MEDICINE | Facility: CLINIC | Age: 40
End: 2020-10-20
Payer: COMMERCIAL

## 2020-10-20 VITALS
HEIGHT: 66 IN | DIASTOLIC BLOOD PRESSURE: 106 MMHG | TEMPERATURE: 97.9 F | HEART RATE: 91 BPM | SYSTOLIC BLOOD PRESSURE: 158 MMHG | WEIGHT: 203 LBS | BODY MASS INDEX: 32.62 KG/M2

## 2020-10-20 DIAGNOSIS — G56.01 CARPAL TUNNEL SYNDROME, RIGHT UPPER LIMB: ICD-10-CM

## 2020-10-20 PROCEDURE — 99072 ADDL SUPL MATRL&STAF TM PHE: CPT

## 2020-10-20 PROCEDURE — 99214 OFFICE O/P EST MOD 30 MIN: CPT | Mod: 25

## 2020-10-20 RX ORDER — CLINDAMYCIN HYDROCHLORIDE 300 MG/1
300 CAPSULE ORAL 3 TIMES DAILY
Qty: 42 | Refills: 0 | Status: DISCONTINUED | COMMUNITY
Start: 2020-08-31 | End: 2020-10-20

## 2020-10-20 RX ORDER — FLUTICASONE PROPIONATE 50 UG/1
50 SPRAY, METERED NASAL TWICE DAILY
Qty: 1 | Refills: 0 | Status: DISCONTINUED | COMMUNITY
Start: 2020-08-31 | End: 2020-10-20

## 2020-10-20 RX ORDER — PREDNISONE 20 MG/1
20 TABLET ORAL DAILY
Qty: 10 | Refills: 0 | Status: DISCONTINUED | COMMUNITY
Start: 2020-08-31 | End: 2020-10-20

## 2020-10-20 RX ORDER — FLUTICASONE PROPIONATE 50 UG/1
50 SPRAY, METERED NASAL TWICE DAILY
Qty: 1 | Refills: 3 | Status: DISCONTINUED | COMMUNITY
Start: 2020-04-16 | End: 2020-10-20

## 2020-10-20 NOTE — HISTORY OF PRESENT ILLNESS
[FreeTextEntry1] : rt arm pain / high bp [de-identified] : Ms. ARIADNE BENNETT is a 40 year female with a PMH of Mild intermittent asthma comes to the office c/o history of elevated blood pressure readings and c/o intermittent right hand numbness that started several weeks ago. Patient has also been under external stressors in her home life recently that is causing anxiety. Patient denies fever, cough SOB. No other complaints at this time.

## 2020-10-20 NOTE — PLAN
[FreeTextEntry1] : Patient BP elevated at time of visit- will start HCTZ 25 mg PO QD and will follow up with patient in 1 week.\par \par Right arm pain- likely carpal tunnel- recommend wrist straps, Voltaren gel. WIll refer to Orthopedic surgeon. \par \par Anxiety- ISTOP checked- Xanax 0.25 mg PO BID PRN prescribed \par \par Insomnia- Patient will continue Trazodone QHS PRN\par \par Counseling included abnormal lab results, differential diagnoses, treatment options, risks and benefits, lifestyle changes, prognosis, current condition, medications, and dose adjustments. \par The patient was interactive, attentive, asked questions, and verbalized understanding

## 2020-10-20 NOTE — PHYSICAL EXAM
[No Acute Distress] : no acute distress [Well Nourished] : well nourished [Well Developed] : well developed [Well-Appearing] : well-appearing [PERRL] : pupils equal round and reactive to light [Normal Sclera/Conjunctiva] : normal sclera/conjunctiva [Normal Outer Ear/Nose] : the outer ears and nose were normal in appearance [EOMI] : extraocular movements intact [Normal Oropharynx] : the oropharynx was normal [No JVD] : no jugular venous distention [No Lymphadenopathy] : no lymphadenopathy [Supple] : supple [Thyroid Normal, No Nodules] : the thyroid was normal and there were no nodules present [No Respiratory Distress] : no respiratory distress  [No Accessory Muscle Use] : no accessory muscle use [Regular Rhythm] : with a regular rhythm [Clear to Auscultation] : lungs were clear to auscultation bilaterally [Normal Rate] : normal rate  [No Murmur] : no murmur heard [Normal S1, S2] : normal S1 and S2 [No Abdominal Bruit] : a ~M bruit was not heard ~T in the abdomen [No Carotid Bruits] : no carotid bruits [Pedal Pulses Present] : the pedal pulses are present [No Varicosities] : no varicosities [No Extremity Clubbing/Cyanosis] : no extremity clubbing/cyanosis [No Edema] : there was no peripheral edema [No Palpable Aorta] : no palpable aorta [Soft] : abdomen soft [No Masses] : no abdominal mass palpated [Non Tender] : non-tender [Non-distended] : non-distended [No HSM] : no HSM [Normal Posterior Cervical Nodes] : no posterior cervical lymphadenopathy [Normal Bowel Sounds] : normal bowel sounds [No CVA Tenderness] : no CVA  tenderness [Normal Anterior Cervical Nodes] : no anterior cervical lymphadenopathy [No Spinal Tenderness] : no spinal tenderness [No Joint Swelling] : no joint swelling [Grossly Normal Strength/Tone] : grossly normal strength/tone [No Rash] : no rash [Coordination Grossly Intact] : coordination grossly intact [No Focal Deficits] : no focal deficits [Normal Gait] : normal gait [Normal Affect] : the affect was normal [Normal Insight/Judgement] : insight and judgment were intact [de-identified] : positive Tinel kg right wrist.

## 2020-10-29 ENCOUNTER — APPOINTMENT (OUTPATIENT)
Dept: INTERNAL MEDICINE | Facility: CLINIC | Age: 40
End: 2020-10-29
Payer: COMMERCIAL

## 2020-10-29 ENCOUNTER — NON-APPOINTMENT (OUTPATIENT)
Age: 40
End: 2020-10-29

## 2020-10-29 VITALS
DIASTOLIC BLOOD PRESSURE: 85 MMHG | BODY MASS INDEX: 32.62 KG/M2 | SYSTOLIC BLOOD PRESSURE: 128 MMHG | HEIGHT: 66 IN | OXYGEN SATURATION: 97 % | WEIGHT: 203 LBS | HEART RATE: 105 BPM | TEMPERATURE: 97.9 F

## 2020-10-29 DIAGNOSIS — M25.561 PAIN IN RIGHT KNEE: ICD-10-CM

## 2020-10-29 PROCEDURE — 99396 PREV VISIT EST AGE 40-64: CPT | Mod: 25

## 2020-10-29 PROCEDURE — 99072 ADDL SUPL MATRL&STAF TM PHE: CPT

## 2020-10-29 PROCEDURE — 90686 IIV4 VACC NO PRSV 0.5 ML IM: CPT

## 2020-10-29 PROCEDURE — G0008: CPT

## 2020-10-29 PROCEDURE — 36415 COLL VENOUS BLD VENIPUNCTURE: CPT

## 2020-10-29 PROCEDURE — 93000 ELECTROCARDIOGRAM COMPLETE: CPT

## 2020-10-29 NOTE — PHYSICAL EXAM

## 2020-10-29 NOTE — PLAN
[FreeTextEntry1] : In regards to patients Physical exam, routine blood work drawn, will review results with patient. \par \par Patient received flu vaccine today. Patient advised of adverse effects of medication including but not limited to muscle soreness at site of injection and general malaise \par \par Abnormal EKG- patient was referred to Cardiologist\par \par RIght knee pain- will obtain MRI and x-ray of right knee. Patient has tried home exercises x 3 months with no improvement. \par \par Counseling included abnormal lab results, differential diagnoses, treatment options, risks and benefits, lifestyle changes, prognosis, current condition, medications, and dose adjustments. \par The patient was interactive, attentive, asked questions, and verbalized understanding

## 2020-10-29 NOTE — COUNSELING
[Weight management counseling provided] : Weight management [Healthy eating counseling provided] : healthy eating [Activity counseling provided] : activity [Needs reinforcement, provided] : Patient needs reinforcement on understanding of disease, goals and obesity follow-up plan; reinforcement was provided [Low Fat Diet] : Low fat diet [Low Salt Diet] : Low salt diet [None] : None

## 2020-10-29 NOTE — HEALTH RISK ASSESSMENT
[Patient reported PAP Smear was normal] : Patient reported PAP Smear was normal [Patient declined bone density test] : Patient declined bone density test [Patient reported colonoscopy was normal] : Patient reported colonoscopy was normal [Very Good] : ~his/her~  mood as very good [Yes] : Yes [Monthly or less (1 pt)] : Monthly or less (1 point) [1 or 2 (0 pts)] : 1 or 2 (0 points) [Never (0 pts)] : Never (0 points) [No falls in past year] : Patient reported no falls in the past year [0] : 2) Feeling down, depressed, or hopeless: Not at all (0) [Patient declined Low Dose CT Scan] : Patient declined Low Dose CT Scan [Patient declined Retinal Exam] : Patient declined Retinal Exam. [Patient declined mammogram] : Patient declined mammogram [HIV test declined] : HIV test declined [Hepatitis C test declined] : Hepatitis C test declined [Reviewed updated] : Reviewed updated [Designated Healthcare Proxy] : Designated healthcare proxy [Name: ___] : Health Care Proxy's Name: [unfilled]  [Relationship: ___] : Relationship: [unfilled] [Aggressive treatment] : aggressive treatment [I will adhere to the patient's wishes as expressed in the advance directive except as noted below.] : I will adhere to the patient's wishes as expressed in the advance directive except as noted below [] : No [de-identified] : Dr. Eastman [Audit-CScore] : 1 [[id number]] : 0 [MammogramDate] : 10/2020 [PapSmearDate] : 03/19 [ColonoscopyDate] : 03/13 [AdvancecareDate] : 03/19

## 2020-10-29 NOTE — HISTORY OF PRESENT ILLNESS
[FreeTextEntry1] : physical [de-identified] : Ms. ARIADNE DESOUZA is a 40 year female with a PMH of HTN,  Asthma, Anxiety comes to the office for physical exam. Patient feels well and has no complaints at this time.

## 2020-10-30 LAB
25(OH)D3 SERPL-MCNC: 36.2 NG/ML
ALBUMIN SERPL ELPH-MCNC: 4.7 G/DL
ALP BLD-CCNC: 83 U/L
ALT SERPL-CCNC: 9 U/L
ANION GAP SERPL CALC-SCNC: 14 MMOL/L
AST SERPL-CCNC: 13 U/L
BASOPHILS # BLD AUTO: 0.08 K/UL
BASOPHILS NFR BLD AUTO: 1.1 %
BILIRUB SERPL-MCNC: 0.3 MG/DL
BUN SERPL-MCNC: 14 MG/DL
CALCIUM SERPL-MCNC: 10.2 MG/DL
CHLORIDE SERPL-SCNC: 101 MMOL/L
CHOLEST SERPL-MCNC: 141 MG/DL
CO2 SERPL-SCNC: 24 MMOL/L
CREAT SERPL-MCNC: 0.84 MG/DL
EOSINOPHIL # BLD AUTO: 0.17 K/UL
EOSINOPHIL NFR BLD AUTO: 2.2 %
ESTIMATED AVERAGE GLUCOSE: 100 MG/DL
GLUCOSE SERPL-MCNC: 117 MG/DL
HBA1C MFR BLD HPLC: 5.1 %
HCT VFR BLD CALC: 36.8 %
HDLC SERPL-MCNC: 52 MG/DL
HGB BLD-MCNC: 10.9 G/DL
IMM GRANULOCYTES NFR BLD AUTO: 0.4 %
LDLC SERPL CALC-MCNC: 62 MG/DL
LYMPHOCYTES # BLD AUTO: 1.43 K/UL
LYMPHOCYTES NFR BLD AUTO: 18.9 %
MAN DIFF?: NORMAL
MCHC RBC-ENTMCNC: 23.7 PG
MCHC RBC-ENTMCNC: 29.6 GM/DL
MCV RBC AUTO: 80 FL
MONOCYTES # BLD AUTO: 0.43 K/UL
MONOCYTES NFR BLD AUTO: 5.7 %
NEUTROPHILS # BLD AUTO: 5.42 K/UL
NEUTROPHILS NFR BLD AUTO: 71.7 %
NONHDLC SERPL-MCNC: 89 MG/DL
PLATELET # BLD AUTO: 509 K/UL
POTASSIUM SERPL-SCNC: 3.7 MMOL/L
PROT SERPL-MCNC: 7.5 G/DL
RBC # BLD: 4.6 M/UL
RBC # FLD: 17.7 %
SODIUM SERPL-SCNC: 139 MMOL/L
TRIGL SERPL-MCNC: 139 MG/DL
TSH SERPL-ACNC: 1.06 UIU/ML
WBC # FLD AUTO: 7.56 K/UL

## 2020-11-16 ENCOUNTER — OUTPATIENT (OUTPATIENT)
Dept: OUTPATIENT SERVICES | Facility: HOSPITAL | Age: 40
LOS: 1 days | Discharge: HOME | End: 2020-11-16

## 2020-11-16 DIAGNOSIS — N20.0 CALCULUS OF KIDNEY: Chronic | ICD-10-CM

## 2020-11-16 DIAGNOSIS — D47.3 ESSENTIAL (HEMORRHAGIC) THROMBOCYTHEMIA: ICD-10-CM

## 2020-11-16 DIAGNOSIS — Z98.89 OTHER SPECIFIED POSTPROCEDURAL STATES: Chronic | ICD-10-CM

## 2020-11-19 ENCOUNTER — RESULT REVIEW (OUTPATIENT)
Age: 40
End: 2020-11-19

## 2020-11-19 ENCOUNTER — APPOINTMENT (OUTPATIENT)
Dept: HEMATOLOGY ONCOLOGY | Facility: CLINIC | Age: 40
End: 2020-11-19
Payer: COMMERCIAL

## 2020-11-19 VITALS
OXYGEN SATURATION: 100 % | BODY MASS INDEX: 33.11 KG/M2 | WEIGHT: 206 LBS | HEIGHT: 66 IN | DIASTOLIC BLOOD PRESSURE: 97 MMHG | HEART RATE: 99 BPM | SYSTOLIC BLOOD PRESSURE: 133 MMHG

## 2020-11-19 DIAGNOSIS — Z83.3 FAMILY HISTORY OF DIABETES MELLITUS: ICD-10-CM

## 2020-11-19 DIAGNOSIS — Z86.39 PERSONAL HISTORY OF OTHER ENDOCRINE, NUTRITIONAL AND METABOLIC DISEASE: ICD-10-CM

## 2020-11-19 DIAGNOSIS — Z83.511 FAMILY HISTORY OF GLAUCOMA: ICD-10-CM

## 2020-11-19 DIAGNOSIS — Z83.438 FAMILY HISTORY OF OTHER DISORDER OF LIPOPROTEIN METABOLISM AND OTHER LIPIDEMIA: ICD-10-CM

## 2020-11-19 PROCEDURE — 99499A: CUSTOM | Mod: NC

## 2020-11-19 PROCEDURE — 99202 OFFICE O/P NEW SF 15 MIN: CPT

## 2020-11-20 LAB
FERRITIN SERPL-MCNC: 12 NG/ML
IRON SATN MFR SERPL: 4 %
IRON SERPL-MCNC: 17 UG/DL
TIBC SERPL-MCNC: 433 UG/DL
UIBC SERPL-MCNC: 416 UG/DL

## 2020-11-20 NOTE — REVIEW OF SYSTEMS
[Fatigue] : fatigue [Negative] : Allergic/Immunologic [FreeTextEntry8] : heavy periods [FreeTextEntry9] : Bilateral knee pain and right elbow pain.

## 2020-11-20 NOTE — ASSESSMENT
[FreeTextEntry1] : Ms. Lyons is a 39 yo WF with a history of thrombocytosis and mild anemia since 2019. She does have heavy periods. Has not received any treatment for the heavy bleeding.. Had tried  oral gummies for iron in the past., Her CBC today shows that her HGB is 11.6, and her platelets are 464,000, both improved. Dr. Alexandria Celisot in to see patient, went over lab work and history and explained that the thrombocytosis is most likely due to iron deficiency anemia secondary to GYN bleeding. We are sending off iron studies today, if she is iron deficient , will start iron supplements and recheck labs in a few weeks/ moths.If iron studies are normal will consider sending further Thrombosis work up, if platelet count does not improve.

## 2020-11-20 NOTE — HISTORY OF PRESENT ILLNESS
[de-identified] : Ms. Lyons is a 39yo WF being referred for thrombocytosis. her platelet count , on record was as high as 576,000 in 2019. She has a history of anemia, with HGB at 10 gms, also from 2019. He has heavy periods for the past few years.Has not received any treatment  for heavy GYN bleeding .Had Covid in March of 2020, recovered. [de-identified] : Periods are still heavy with clots. Has fatigue and occasional palpitations. Recently started on HCTZ for hypertension

## 2020-11-23 ENCOUNTER — RX RENEWAL (OUTPATIENT)
Age: 40
End: 2020-11-23

## 2020-11-24 ENCOUNTER — NON-APPOINTMENT (OUTPATIENT)
Age: 40
End: 2020-11-24

## 2020-11-24 LAB
BASOPHILS # BLD AUTO: 0.1 K/UL — SIGNIFICANT CHANGE UP (ref 0–0.2)
BASOPHILS NFR BLD AUTO: 1.1 % — SIGNIFICANT CHANGE UP (ref 0–2)
EOSINOPHIL # BLD AUTO: 0.2 K/UL — SIGNIFICANT CHANGE UP (ref 0–0.5)
EOSINOPHIL NFR BLD AUTO: 2.3 % — SIGNIFICANT CHANGE UP (ref 0–6)
HCT VFR BLD CALC: 38.3 % — SIGNIFICANT CHANGE UP (ref 34.5–45)
HGB BLD-MCNC: 11.6 G/DL — SIGNIFICANT CHANGE UP (ref 11.5–15.5)
LYMPHOCYTES # BLD AUTO: 1.7 K/UL — SIGNIFICANT CHANGE UP (ref 1–3.3)
LYMPHOCYTES # BLD AUTO: 18.5 % — SIGNIFICANT CHANGE UP (ref 13–44)
MCHC RBC-ENTMCNC: 24.7 PG — LOW (ref 27–34)
MCHC RBC-ENTMCNC: 30.3 G/DL — LOW (ref 32–36)
MCV RBC AUTO: 81.5 FL — SIGNIFICANT CHANGE UP (ref 80–100)
MONOCYTES # BLD AUTO: 0.4 K/UL — SIGNIFICANT CHANGE UP (ref 0–0.9)
MONOCYTES NFR BLD AUTO: 4.8 % — SIGNIFICANT CHANGE UP (ref 2–14)
NEUTROPHILS # BLD AUTO: 6.6 K/UL — SIGNIFICANT CHANGE UP (ref 1.8–7.4)
NEUTROPHILS NFR BLD AUTO: 73.3 % — SIGNIFICANT CHANGE UP (ref 43–77)
PLATELET # BLD AUTO: 464 K/UL — HIGH (ref 150–400)
RBC # BLD: 4.7 M/UL — SIGNIFICANT CHANGE UP (ref 3.8–5.2)
RBC # FLD: 16.7 % — HIGH (ref 10.3–14.5)
WBC # BLD: 9.1 K/UL — SIGNIFICANT CHANGE UP (ref 3.8–10.5)
WBC # FLD AUTO: 9.1 K/UL — SIGNIFICANT CHANGE UP (ref 3.8–10.5)

## 2020-12-22 ENCOUNTER — RX RENEWAL (OUTPATIENT)
Age: 40
End: 2020-12-22

## 2020-12-23 PROBLEM — Z87.09 HISTORY OF ACUTE BRONCHITIS: Status: RESOLVED | Noted: 2020-04-23 | Resolved: 2020-12-23

## 2020-12-29 NOTE — DISCHARGE NOTE ADULT - THE PATIENT HAS
Digital [facility]: Health  Follow-Up    The history is provided by the patient.             Reason for review: Blood pressure not at goal        Topics Covered on Call: physical activity and Diet    Additional Follow-up details: Avg BP as of Dec 29, 2020 is 111/84. Trending down.     His cuff is packed right now due to moving but will resume soon.     Dr Looney encouraged his results and told him to continue his diet or exercise routine.             Diet-no change to diet    No change to diet.        Physical Activity-no change to routine  No change to exercise routine.     Medication Adherence-Medication Adherence not addressed.      Substance, Sleep, Stress-Not assessed      Continue current diet/physical activity routine.       Addressed patient questions and patient has my contact information if needed prior to next outreach. Patient verbalizes understanding.      Explained the importance of self-monitoring and medication adherence. Encouraged the patient to communicate with their health  for lifestyle modifications to help improve or maintain a healthy lifestyle.               There are no preventive care reminders to display for this patient.      Last 5 Patient Entered Readings                                      Current 30 Day Average: 111/84     Recent Readings 12/14/2020 12/14/2020 12/14/2020 12/10/2020 12/10/2020    SBP (mmHg) 106 121 123 104 106    DBP (mmHg) 82 82 85 80 83    Pulse 63 64 65 60 73                no difficulties

## 2021-01-04 ENCOUNTER — OUTPATIENT (OUTPATIENT)
Dept: OUTPATIENT SERVICES | Facility: HOSPITAL | Age: 41
LOS: 1 days | Discharge: HOME | End: 2021-01-04

## 2021-01-04 DIAGNOSIS — Z98.89 OTHER SPECIFIED POSTPROCEDURAL STATES: Chronic | ICD-10-CM

## 2021-01-04 DIAGNOSIS — N20.0 CALCULUS OF KIDNEY: Chronic | ICD-10-CM

## 2021-01-04 DIAGNOSIS — D47.3 ESSENTIAL (HEMORRHAGIC) THROMBOCYTHEMIA: ICD-10-CM

## 2021-01-08 ENCOUNTER — RESULT REVIEW (OUTPATIENT)
Age: 41
End: 2021-01-08

## 2021-01-08 ENCOUNTER — APPOINTMENT (OUTPATIENT)
Dept: HEMATOLOGY ONCOLOGY | Facility: CLINIC | Age: 41
End: 2021-01-08
Payer: COMMERCIAL

## 2021-01-08 VITALS
HEART RATE: 94 BPM | WEIGHT: 211.06 LBS | OXYGEN SATURATION: 96 % | BODY MASS INDEX: 33.92 KG/M2 | SYSTOLIC BLOOD PRESSURE: 137 MMHG | DIASTOLIC BLOOD PRESSURE: 87 MMHG | HEIGHT: 66 IN

## 2021-01-08 LAB
BASOPHILS # BLD AUTO: 0.1 K/UL — SIGNIFICANT CHANGE UP (ref 0–0.2)
BASOPHILS NFR BLD AUTO: 1 % — SIGNIFICANT CHANGE UP (ref 0–2)
EOSINOPHIL # BLD AUTO: 0.2 K/UL — SIGNIFICANT CHANGE UP (ref 0–0.5)
EOSINOPHIL NFR BLD AUTO: 1.6 % — SIGNIFICANT CHANGE UP (ref 0–6)
HCT VFR BLD CALC: 42.4 % — SIGNIFICANT CHANGE UP (ref 34.5–45)
HGB BLD-MCNC: 12.9 G/DL — SIGNIFICANT CHANGE UP (ref 11.5–15.5)
LYMPHOCYTES # BLD AUTO: 1.8 K/UL — SIGNIFICANT CHANGE UP (ref 1–3.3)
LYMPHOCYTES # BLD AUTO: 17.4 % — SIGNIFICANT CHANGE UP (ref 13–44)
MCHC RBC-ENTMCNC: 27.4 PG — SIGNIFICANT CHANGE UP (ref 27–34)
MCHC RBC-ENTMCNC: 30.4 G/DL — LOW (ref 32–36)
MCV RBC AUTO: 90.1 FL — SIGNIFICANT CHANGE UP (ref 80–100)
MONOCYTES # BLD AUTO: 0.7 K/UL — SIGNIFICANT CHANGE UP (ref 0–0.9)
MONOCYTES NFR BLD AUTO: 7.2 % — SIGNIFICANT CHANGE UP (ref 2–14)
NEUTROPHILS # BLD AUTO: 7.4 K/UL — SIGNIFICANT CHANGE UP (ref 1.8–7.4)
NEUTROPHILS NFR BLD AUTO: 72.9 % — SIGNIFICANT CHANGE UP (ref 43–77)
PLATELET # BLD AUTO: 422 K/UL — HIGH (ref 150–400)
RBC # BLD: 4.7 M/UL — SIGNIFICANT CHANGE UP (ref 3.8–5.2)
RBC # FLD: 19.4 % — HIGH (ref 10.3–14.5)
WBC # BLD: 10.2 K/UL — SIGNIFICANT CHANGE UP (ref 3.8–10.5)
WBC # FLD AUTO: 10.2 K/UL — SIGNIFICANT CHANGE UP (ref 3.8–10.5)

## 2021-01-08 PROCEDURE — 99072 ADDL SUPL MATRL&STAF TM PHE: CPT

## 2021-01-08 PROCEDURE — 99213 OFFICE O/P EST LOW 20 MIN: CPT

## 2021-01-13 NOTE — HISTORY OF PRESENT ILLNESS
[de-identified] : Ms. Lyons is a 39yo WF being referred for thrombocytosis. her platelet count , on record was as high as 576,000 in 2019. She has a history of anemia, with HGB at 10 gms, also from 2019. He has heavy periods for the past few years.Has not received any treatment for heavy GYN bleeding.Had Covid in March of 2020, recovered.  [de-identified] : Tolerating PO ferrous sulfate fine, taking 3 per day. periods are the same. She has noticed a litle more fatigue in past week, She has stopped taking the iron with orange juice.

## 2021-01-13 NOTE — ASSESSMENT
[FreeTextEntry1] : Ms. Lyons is a 39 yo WF with a history of thrombocytosis and mild anemia since 2019. She does have heavy periods. Has not received any treatment for the heavy bleeding.She has been on Ferrous sulfate , 3 per day, advised her to restart the orange juice with the iron. Her HGb has increased to 12.9 gms, Platelet count dropped slightly to 422,000. I am checking her iron studies today and will decide based on these results if she can continue with oral iron or needs to get IV iron.I will call her after reviewing lab work.

## 2021-01-19 ENCOUNTER — RX RENEWAL (OUTPATIENT)
Age: 41
End: 2021-01-19

## 2021-01-22 LAB
FERRITIN SERPL-MCNC: 85 NG/ML
IRON SATN MFR SERPL: 45 %
IRON SERPL-MCNC: 168 UG/DL
TIBC SERPL-MCNC: 374 UG/DL
UIBC SERPL-MCNC: 206 UG/DL

## 2021-01-28 ENCOUNTER — NON-APPOINTMENT (OUTPATIENT)
Age: 41
End: 2021-01-28

## 2021-01-28 ENCOUNTER — RX RENEWAL (OUTPATIENT)
Age: 41
End: 2021-01-28

## 2021-02-04 ENCOUNTER — NON-APPOINTMENT (OUTPATIENT)
Age: 41
End: 2021-02-04

## 2021-02-12 ENCOUNTER — RX RENEWAL (OUTPATIENT)
Age: 41
End: 2021-02-12

## 2021-02-22 ENCOUNTER — RX RENEWAL (OUTPATIENT)
Age: 41
End: 2021-02-22

## 2021-02-25 ENCOUNTER — APPOINTMENT (OUTPATIENT)
Dept: INTERNAL MEDICINE | Facility: CLINIC | Age: 41
End: 2021-02-25
Payer: COMMERCIAL

## 2021-02-25 DIAGNOSIS — J45.901 UNSPECIFIED ASTHMA WITH (ACUTE) EXACERBATION: ICD-10-CM

## 2021-02-25 PROCEDURE — 99213 OFFICE O/P EST LOW 20 MIN: CPT | Mod: 95

## 2021-02-25 NOTE — HISTORY OF PRESENT ILLNESS
[Home] : at home, [unfilled] , at the time of the visit. [Medical Office: ([facility])___] : at the medical office located in  [Verbal consent obtained from patient] : the patient, [unfilled] [FreeTextEntry8] : Ms. ARIADNE DESOUZA is a 40 year female calls the office c/o  sinus congestion and green nasal discharge x 10 days Patient denies fever, cough SOB. No other complaints at this time.

## 2021-02-25 NOTE — PLAN
[FreeTextEntry1] : Patient with sinusitis, will start antibiotics and intranasal steroid today.\par \par In regards to patient's asthma, patient will continue medication.\par \par During conversation with patient extensive medical records were reviewed including but not limited to, Hospital records records, out patient records, laboratory data and microbiology data \par In addition extensive time was also spent in reviewing diagnostic studies.\par \par Total encounter total time 20 mins\par >50% of time spent counseling/coordinating care\par \par Counseling included abnormal lab results, differential diagnoses, treatment options, risks and benefits, lifestyle changes, current condition, medications, and dose adjustments. \par The patient was interactive, attentive, asked questions, and verbalized understanding

## 2021-02-25 NOTE — PHYSICAL EXAM
[No Acute Distress] : no acute distress [Well Nourished] : well nourished [Well Developed] : well developed [Well-Appearing] : well-appearing [Normal Sclera/Conjunctiva] : normal sclera/conjunctiva [Normal Outer Ear/Nose] : the outer ears and nose were normal in appearance [No Respiratory Distress] : no respiratory distress  [Coordination Grossly Intact] : coordination grossly intact [Normal Affect] : the affect was normal [Normal Insight/Judgement] : insight and judgment were intact

## 2021-03-23 ENCOUNTER — OUTPATIENT (OUTPATIENT)
Dept: OUTPATIENT SERVICES | Facility: HOSPITAL | Age: 41
LOS: 1 days | Discharge: HOME | End: 2021-03-23

## 2021-03-23 ENCOUNTER — RX RENEWAL (OUTPATIENT)
Age: 41
End: 2021-03-23

## 2021-03-23 DIAGNOSIS — N20.0 CALCULUS OF KIDNEY: Chronic | ICD-10-CM

## 2021-03-23 DIAGNOSIS — Z98.89 OTHER SPECIFIED POSTPROCEDURAL STATES: Chronic | ICD-10-CM

## 2021-03-23 DIAGNOSIS — D47.3 ESSENTIAL (HEMORRHAGIC) THROMBOCYTHEMIA: ICD-10-CM

## 2021-03-26 ENCOUNTER — APPOINTMENT (OUTPATIENT)
Dept: HEMATOLOGY ONCOLOGY | Facility: CLINIC | Age: 41
End: 2021-03-26

## 2021-03-29 ENCOUNTER — APPOINTMENT (OUTPATIENT)
Dept: INTERNAL MEDICINE | Facility: CLINIC | Age: 41
End: 2021-03-29
Payer: COMMERCIAL

## 2021-03-29 ENCOUNTER — NON-APPOINTMENT (OUTPATIENT)
Age: 41
End: 2021-03-29

## 2021-03-29 VITALS
SYSTOLIC BLOOD PRESSURE: 127 MMHG | HEART RATE: 96 BPM | WEIGHT: 211 LBS | TEMPERATURE: 97.6 F | HEIGHT: 66 IN | BODY MASS INDEX: 33.91 KG/M2 | DIASTOLIC BLOOD PRESSURE: 96 MMHG

## 2021-03-29 PROCEDURE — 99214 OFFICE O/P EST MOD 30 MIN: CPT

## 2021-03-29 PROCEDURE — 99072 ADDL SUPL MATRL&STAF TM PHE: CPT

## 2021-03-29 NOTE — PLAN
[FreeTextEntry1] : In regards to patients Physical exam, routine blood work drawn, will review results with patient. \par \par Intermitted palpitations ECG in office was Normal sinus rhythm - patient started on Cardizem, patient was referred to Cardiologist\par \par patient's BP well controlled with current medication. will continue current  regimen \par \par \par Prior to appointment and during encounter with patient extensive medical records were reviewed including but not limited to, Hospital records records, out patient records, laboratory data and microbiology data \par In addition extensive time was also spent in reviewing diagnostic studies.\par \par Total encounter total time 30 mins\par >50% of time spent counseling/coordinating care\par \par Counseling included abnormal lab results, differential diagnoses, treatment options, risks and benefits, lifestyle changes, prognosis, current condition, medications, and dose adjustments. \par The patient was interactive, attentive, asked questions, and verbalized understanding

## 2021-03-29 NOTE — HISTORY OF PRESENT ILLNESS
[FreeTextEntry1] : Intermittent palpitations [de-identified] : Ms. ARIADNE DESOUZA is a 40 year female with a PMH of HTN,  Asthma, Anxiety comes to the office c/o Intermittent palpitations for the past 2 weeks, no known triggers, ther last for <1 minute each time. denies chest pain, SOB, nausea, abdominal pain, denies having anxiety at time of palpitations. Patient denies fever, cough No other complaints at this time. Patient is asymptomatic at time of visit.

## 2021-04-08 ENCOUNTER — NON-APPOINTMENT (OUTPATIENT)
Age: 41
End: 2021-04-08

## 2021-04-08 ENCOUNTER — APPOINTMENT (OUTPATIENT)
Dept: CARDIOLOGY | Facility: CLINIC | Age: 41
End: 2021-04-08
Payer: COMMERCIAL

## 2021-04-08 VITALS
TEMPERATURE: 98.1 F | DIASTOLIC BLOOD PRESSURE: 98 MMHG | BODY MASS INDEX: 35.2 KG/M2 | HEIGHT: 66 IN | SYSTOLIC BLOOD PRESSURE: 132 MMHG | WEIGHT: 219 LBS | HEART RATE: 93 BPM | RESPIRATION RATE: 16 BRPM

## 2021-04-08 DIAGNOSIS — Z72.3 LACK OF PHYSICAL EXERCISE: ICD-10-CM

## 2021-04-08 DIAGNOSIS — Z00.00 ENCOUNTER FOR GENERAL ADULT MEDICAL EXAMINATION W/OUT ABNORMAL FINDINGS: ICD-10-CM

## 2021-04-08 DIAGNOSIS — M79.606 PAIN IN LEG, UNSPECIFIED: ICD-10-CM

## 2021-04-08 DIAGNOSIS — J01.10 ACUTE FRONTAL SINUSITIS, UNSPECIFIED: ICD-10-CM

## 2021-04-08 DIAGNOSIS — R94.31 ABNORMAL ELECTROCARDIOGRAM [ECG] [EKG]: ICD-10-CM

## 2021-04-08 DIAGNOSIS — Z13.29 ENCOUNTER FOR SCREENING FOR OTHER SUSPECTED ENDOCRINE DISORDER: ICD-10-CM

## 2021-04-08 DIAGNOSIS — Z82.49 FAMILY HISTORY OF ISCHEMIC HEART DISEASE AND OTHER DISEASES OF THE CIRCULATORY SYSTEM: ICD-10-CM

## 2021-04-08 DIAGNOSIS — Z92.89 PERSONAL HISTORY OF OTHER MEDICAL TREATMENT: ICD-10-CM

## 2021-04-08 DIAGNOSIS — J18.9 PNEUMONIA, UNSPECIFIED ORGANISM: ICD-10-CM

## 2021-04-08 DIAGNOSIS — Z87.898 PERSONAL HISTORY OF OTHER SPECIFIED CONDITIONS: ICD-10-CM

## 2021-04-08 DIAGNOSIS — R06.02 SHORTNESS OF BREATH: ICD-10-CM

## 2021-04-08 PROCEDURE — 99072 ADDL SUPL MATRL&STAF TM PHE: CPT

## 2021-04-08 PROCEDURE — 93000 ELECTROCARDIOGRAM COMPLETE: CPT

## 2021-04-08 PROCEDURE — 99204 OFFICE O/P NEW MOD 45 MIN: CPT

## 2021-04-08 RX ORDER — DILTIAZEM HYDROCHLORIDE 30 MG/1
30 TABLET ORAL TWICE DAILY
Qty: 60 | Refills: 0 | Status: DISCONTINUED | COMMUNITY
Start: 2021-03-29 | End: 2021-04-08

## 2021-04-08 RX ORDER — CHLORHEXIDINE GLUCONATE 4 %
325 (65 FE) LIQUID (ML) TOPICAL
Qty: 60 | Refills: 2 | Status: DISCONTINUED | COMMUNITY
Start: 2020-11-24 | End: 2021-04-08

## 2021-04-08 RX ORDER — DICLOFENAC SODIUM 10 MG/G
1 GEL TOPICAL DAILY
Qty: 1 | Refills: 1 | Status: DISCONTINUED | COMMUNITY
Start: 2020-10-20 | End: 2021-04-08

## 2021-04-08 RX ORDER — CLINDAMYCIN HYDROCHLORIDE 300 MG/1
300 CAPSULE ORAL 3 TIMES DAILY
Qty: 42 | Refills: 0 | Status: DISCONTINUED | COMMUNITY
Start: 2021-02-25 | End: 2021-04-08

## 2021-04-08 RX ORDER — DICLOFENAC SODIUM 10 MG/G
1 GEL TOPICAL DAILY
Qty: 1 | Refills: 1 | Status: DISCONTINUED | COMMUNITY
Start: 2020-10-29 | End: 2021-04-08

## 2021-04-08 RX ORDER — FLUTICASONE PROPIONATE 50 UG/1
50 SPRAY, METERED NASAL TWICE DAILY
Qty: 1 | Refills: 3 | Status: DISCONTINUED | COMMUNITY
Start: 2021-02-25 | End: 2021-04-08

## 2021-04-08 NOTE — DISCUSSION/SUMMARY
[FreeTextEntry1] : 1.  Check echocardiogram to further evaluate her palpitations.\par 2.  Check Holter monitor for 24 hours to evaluate her palpitations further.  She may need additional ambulatory monitoring depending on if she has more symptoms.\par 3.  Change Cardizem to Cardizem  mg daily for palpitations and hypertension.\par 4.  Continue hydrochlorothiazide as well for hypertension.\par 5.  Monitor BP at home, keep a log and bring to f/u.\par 6.  Patient encouraged to work on diet to lose weight.\par 7.  Patient is encouraged to exercise at least 30 minutes a day everyday of the week.\par 8.  Follow up here after testing, and will make further recommendations at that time.

## 2021-04-08 NOTE — ASSESSMENT
[FreeTextEntry1] : EKG: Sinus rhythm with borderline T wave changes.  Late R wave transition.\par \par 40-year-old female with past medical history of hypertension and obesity who presents to me for evaluation of recent palpitations.  Patient's palpitations may simply represent extrasystolic beating or very  well may be related primarily to anxiety.  Her palpitations have since resolved and it may be a bit difficult to make a final diagnosis but I will start with a 24-hour Holter monitor.  I will transition her to long-acting Cardizem so she can take it once a day and get full 24-hour coverage.  He does seem to be working to control her palpitations.  Blood pressure little elevated today but I have asked her to start monitoring them more closely at home and keep a list and bring in a follow-up before making any additional changes to her antihypertensive regimen.  Lipids are well controlled.

## 2021-04-08 NOTE — HISTORY OF PRESENT ILLNESS
[FreeTextEntry1] : Patient comes to the office for evaluation for recent palpitations.  Patient was diagnosed with hypertension 6 or 9 months ago.  There is a strong family history of early hypertension in both her parents.  Over the last couple of weeks she started having palpitations that were worsening.  She has had a palpitations for a year or 2 intermittently but they seem to become more frequent and more severe.  Weekend before last she had 3 days where they were very severe and she felt that her heart was pounding out of her chest.  She went to see her primary doctor who started her on diltiazem.  This seems to have improved her symptoms and her palpitations have essentially resolved over the last several days.  Patient does not monitor her blood pressure at home but has been on hydrochlorothiazide.  She only checks her blood pressure when she notices symptoms which primarily include headaches and right arm tightness.  She reports no other specific physical symptoms.  Patient denies chest pain, shortness of breath, orthopnea, presyncope, syncope.

## 2021-04-08 NOTE — PHYSICAL EXAM
[General Appearance - In No Acute Distress] : no acute distress [Auscultation Breath Sounds / Voice Sounds] : lungs were clear to auscultation bilaterally [Abdomen Soft] : soft [Abdomen Tenderness] : non-tender [Oriented To Time, Place, And Person] : oriented to person, place, and time [Affect] : the affect was normal [FreeTextEntry1] : No edema

## 2021-04-09 ENCOUNTER — APPOINTMENT (OUTPATIENT)
Dept: CARDIOLOGY | Facility: CLINIC | Age: 41
End: 2021-04-09
Payer: COMMERCIAL

## 2021-04-09 PROCEDURE — ZZZZZ: CPT

## 2021-04-21 ENCOUNTER — RX CHANGE (OUTPATIENT)
Age: 41
End: 2021-04-21

## 2021-04-26 ENCOUNTER — RX RENEWAL (OUTPATIENT)
Age: 41
End: 2021-04-26

## 2021-05-05 ENCOUNTER — APPOINTMENT (OUTPATIENT)
Dept: CARDIOLOGY | Facility: CLINIC | Age: 41
End: 2021-05-05
Payer: COMMERCIAL

## 2021-05-05 PROCEDURE — 99072 ADDL SUPL MATRL&STAF TM PHE: CPT

## 2021-05-05 PROCEDURE — 93306 TTE W/DOPPLER COMPLETE: CPT

## 2021-05-11 ENCOUNTER — APPOINTMENT (OUTPATIENT)
Dept: CARDIOLOGY | Facility: CLINIC | Age: 41
End: 2021-05-11
Payer: COMMERCIAL

## 2021-05-11 DIAGNOSIS — R00.2 PALPITATIONS: ICD-10-CM

## 2021-05-11 PROCEDURE — 99213 OFFICE O/P EST LOW 20 MIN: CPT | Mod: 95

## 2021-05-11 NOTE — REASON FOR VISIT
[Home] : at home, [unfilled] , at the time of the visit. [Medical Office: ([facility])___] : at the medical office located in  [Verbal consent obtained from patient] : the patient, [unfilled]

## 2021-05-11 NOTE — HISTORY OF PRESENT ILLNESS
[FreeTextEntry1] : Patient presents today having changed her visit to telehealth because she woke up very dizzy this morning. She states that she does have dizziness off and on but it was particularly severe this morning. She is feeling a little better now. Her palpitations have been well controlled but on Saturday night going into Sunday morning she started having significant palpitations. She became concerned in the middle of the night and took an extra hydrochlorothiazide and an extra diltiazem. The palpitations then did subside and she was able to go back to sleep. When she awoke in the morning she did not have any further palpitations at all. No lightheadedness or dizziness at that time. She has not been checking her blood pressure. Patient denies chest pain, shortness of breath, palpitations, orthopnea, presyncope, syncope.

## 2021-05-11 NOTE — DISCUSSION/SUMMARY
[FreeTextEntry1] : 1. No additional cardiac testing at this time.\par 2. Continue current cardiac meds in doses as noted above for hypertension and palpitations.\par 3. Monitor BP at home, keep a log and bring to f/u.\par 4. Patient encouraged to work on diet to lose weight.\par 5. Patient is encouraged to exercise at least 30 minutes a day everyday of the week.\par 6. Follow up here in two months.

## 2021-05-26 ENCOUNTER — RX CHANGE (OUTPATIENT)
Age: 41
End: 2021-05-26

## 2021-05-26 RX ORDER — FLUTICASONE PROPIONATE AND SALMETEROL 50; 250 UG/1; UG/1
250-50 POWDER RESPIRATORY (INHALATION) TWICE DAILY
Qty: 60 | Refills: 3 | Status: DISCONTINUED | COMMUNITY
Start: 2020-01-10 | End: 2021-05-26

## 2021-06-02 ENCOUNTER — RX RENEWAL (OUTPATIENT)
Age: 41
End: 2021-06-02

## 2021-07-08 ENCOUNTER — APPOINTMENT (OUTPATIENT)
Dept: CARDIOLOGY | Facility: CLINIC | Age: 41
End: 2021-07-08

## 2021-07-30 ENCOUNTER — RX RENEWAL (OUTPATIENT)
Age: 41
End: 2021-07-30

## 2021-08-05 NOTE — ASU PATIENT PROFILE, ADULT - NS SC CAGE ALCOHOL EYE OPENER
Emergency Department Resident Note    Patient: Celeste Francosi Age: 11 year old Sex: female   MRN: [id number] : 2010 Encounter Date: 2021       HISTORY OF PRESENT ILLNESS  This is a 11 year old female past medical history of ADHD on Adderall presenting for syncopal episode at 10 AM today.  Patient was sitting on the couch when she was asked to go to something from the bathroom so she stood and walked over when she felt like she might blackout.  This is the last thing she remembers.  Her mom heard a some commotion from the other room and called her name but she did not respond.  At the time the mom went to check on her and found her slumped over with about 30 seconds of spastic movements of her upper extremities and not responding to her.  Subsequently the patient came to us responding and stated she felt numb and tingling throughout her body.  Patient was back to her baseline mental status and acting appropriately was able to stand on her own and presented to the emergency room.  Prior to this episode patient had been feeling overall well , has had good p.o. intake, no recent illnesses, no fevers, nausea, vomiting, diarrhea, dysuria, denies chest pain, shortness of breath, palpitations, change in vision or hearing.  No family history of seizure, sudden death or drowning.  Patient older sister has had 1 episode of syncopal in the past without recurrence.  No tongue biting or urinary incontinence.    REVIEW OF SYSTEMS  Review of Systems   All other systems reviewed and are negative.  Is seen in the HPI.     PAST HISTORY         Past Medical History:   Diagnosis Date   • Attention deficit disorder     No past surgical history on file.          Social History     Tobacco Use   • Smoking status: Not on file   Substance Use Topics   • Alcohol use: Not on file   • Drug use: Not on file    No family history on file.          Prior to Admission medications    Medication Sig Start Date End Date Taking?  Authorizing Provider   amphetamine-dextroamphetamine (Adderall) 10 MG tablet Take 10 mg by mouth daily.   Yes Outside Provider    No Known Allergies        Additional Information:     PHYSICAL EXAMINATION  ED Triage Vitals [08/05/21 1019]   ED Triage Vitals Group      Temp 98.4 °F (36.9 °C)      Heart Rate 92      Resp 20      /75      SpO2 99 %      EtCO2 mmHg       Height       Weight 79 lb 12.9 oz (36.2 kg)      Weight Scale Used Standing scale      BMI (Calculated)       IBW/kg (Calculated)      Physical Exam  Constitutional:       General: She is not in acute distress.     Appearance: Normal appearance. She is not toxic-appearing.   HENT:      Head: Normocephalic and atraumatic.      Right Ear: External ear normal.      Left Ear: External ear normal.      Nose: Nose normal.      Mouth/Throat:      Mouth: Mucous membranes are moist.      Pharynx: Oropharynx is clear.   Eyes:      Conjunctiva/sclera: Conjunctivae normal.   Cardiovascular:      Rate and Rhythm: Normal rate and regular rhythm.   Pulmonary:      Effort: Pulmonary effort is normal. No respiratory distress.      Breath sounds: No stridor. No wheezing.   Abdominal:      General: Abdomen is flat.      Palpations: Abdomen is soft.      Tenderness: There is no abdominal tenderness.   Musculoskeletal:         General: No deformity. Normal range of motion.      Cervical back: Normal range of motion. No rigidity.   Skin:     General: Skin is warm.      Capillary Refill: Capillary refill takes less than 2 seconds.      Findings: No rash.   Neurological:      General: No focal deficit present.      Mental Status: She is alert and oriented for age.      Cranial Nerves: No cranial nerve deficit.      Sensory: No sensory deficit.      Motor: No weakness.      Coordination: Coordination normal.      Gait: Gait normal.   Psychiatric:         Behavior: Behavior normal.         Labs:   Results for orders placed or performed during the hospital encounter of  08/05/21   Basic Metabolic Panel   Result Value Ref Range    Fasting Status      Sodium 139 135 - 145 mmol/L    Potassium 3.8 3.4 - 5.1 mmol/L    Chloride 108 (H) 98 - 107 mmol/L    Carbon Dioxide 26 21 - 32 mmol/L    Anion Gap 9 (L) 10 - 20 mmol/L    Glucose 89 65 - 99 mg/dL    BUN 7 5 - 18 mg/dL    Creatinine 0.35 (L) 0.39 - 0.90 mg/dL    Glomerular Filtration Rate      BUN/ Creatinine Ratio 20 7 - 25    Calcium 9.3 8.0 - 11.0 mg/dL   CBC with Automated Differential (performable only)   Result Value Ref Range    WBC 6.1 4.2 - 11.0 K/mcL    RBC 4.50 3.90 - 5.30 mil/mcL    HGB 13.2 12.0 - 15.5 g/dL    HCT 37.9 36.0 - 46.5 %    MCV 84.2 78.0 - 100.0 fl    MCH 29.3 26.0 - 34.0 pg    MCHC 34.8 32.0 - 36.5 g/dL    RDW-CV 12.5 11.0 - 15.0 %    RDW-SD 37.5 35.0 - 47.0 fL     140 - 450 K/mcL    NRBC 0 <=0 /100 WBC    Neutrophil, Percent 44 %    Lymphocytes, Percent 47 %    Mono, Percent 7 %    Eosinophils, Percent 1 %    Basophils, Percent 1 %    Immature Granulocytes 0 %    Absolute Neutrophils 2.7 1.8 - 8.0 K/mcL    Absolute Lymphocytes 2.9 1.5 - 6.5 K/mcL    Absolute Monocytes 0.4 0.0 - 0.8 K/mcL    Absolute Eosinophils  0.1 0.0 - 0.5 K/mcL    Absolute Basophils 0.0 0.0 - 0.2 K/mcL    Absolute Immmature Granulocytes 0.0 0.0 - 0.2 K/mcL   GLUCOSE, BEDSIDE - POINT OF CARE   Result Value Ref Range    GLUCOSE, BEDSIDE - POINT OF CARE 75 70 - 99 mg/dL     Imaging:   No orders to display     EKG: EKG reviewed and showed NSR, LA interval 136 ms, QTc 449, normal EKG      MEDICAL DECISION MAKING  This is a 11 year old female presenting for single episode of loss of consciousness with immediate return to baseline.   Normal neuro examination.  No signs or symptoms consistent with seizure presentation at this time.  ECG reassuring.  Electrolytes within normal limits.  Neurology made aware of patient case and feels patient is appropriate for outpatient follow-up with neurology.  This was discussed with the patient, mother and  father at the bedside who acknowledged understanding, and were without further questions.     ED Course as of Aug 05 1754   Thu Aug 05, 2021   1627 Labs are reassuring, will contact neurology via [employer] to confirm plan for outpatient follow-up.    [RV]      ED Course User Index  [RV] Marty Soliz MD     ED Medication Orders (From admission, onward)    None        Procedures    IMPRESSION AND PLAN  ED Diagnosis   1. Vasovagal syncope         DISPOSITION  Discharge 8/5/2021  4:33 PM  Celeste Francois discharge to home/self care.        I participated in the care of this patient and this note provides additional information regarding their visit. Please refer to attending note for further details regarding history, physical exam, workup, medical decision making, and disposition. This note may have been created using voice dictation technology and may include inadvertent errors.      Lizzeth Mallory  Emergency Medicine, PGY1  [employer]:        Lizzeth Mallory  Resident  08/05/21 2779     no

## 2022-01-03 ENCOUNTER — RX RENEWAL (OUTPATIENT)
Age: 42
End: 2022-01-03

## 2022-01-11 NOTE — PATIENT PROFILE ADULT. - NSALCOHOLFREQ_GEN_A_CORE_SD
Airway  Performed by: Arjun Quintero MD  Authorized by: Arjun Quintero MD     Final Airway Type:  Endotracheal airway  Final Endotracheal Airway*:  ETT  ETT Size (mm)*:  7.0  Cuff*:  Regular  Technique Used for Successful ETT Placement:  Video laryngoscopy  Devices/Methods Used in Placement*:  Mask  Intubation Procedure*:  ETCO2, Preoxygenation, Atraumatic, Dentition Unchanged and Pharynx Clear  Insertion Site:  Oral  Blade Type*:  Video Laryngoscope  Blade Size*:  3 (D blade)  Secured at (cm)*:  21  Placement Verified by: auscultation, capnometry and palpation of cuff    Glottic View*:  1 - full view of glottis  Attempts*:  1  Location:  OR  Urgency:  Elective  Difficult Airway: No    Indications for Airway Management:  Anesthesia  Sedation Level:  Deep  Mask Difficulty Assessment:  1 - vent by mask  Performed By:  Anesthesiologist  Start Time: 1/11/2022 9:49 AM        
monthly or less

## 2022-01-20 ENCOUNTER — RESULT REVIEW (OUTPATIENT)
Age: 42
End: 2022-01-20

## 2022-02-03 ENCOUNTER — APPOINTMENT (OUTPATIENT)
Dept: INTERNAL MEDICINE | Facility: CLINIC | Age: 42
End: 2022-02-03
Payer: COMMERCIAL

## 2022-02-03 VITALS
WEIGHT: 212 LBS | BODY MASS INDEX: 34.07 KG/M2 | HEIGHT: 66 IN | SYSTOLIC BLOOD PRESSURE: 115 MMHG | TEMPERATURE: 97.8 F | DIASTOLIC BLOOD PRESSURE: 79 MMHG | HEART RATE: 102 BPM

## 2022-02-03 DIAGNOSIS — E03.8 OTHER SPECIFIED HYPOTHYROIDISM: ICD-10-CM

## 2022-02-03 DIAGNOSIS — R73.9 HYPERGLYCEMIA, UNSPECIFIED: ICD-10-CM

## 2022-02-03 DIAGNOSIS — E55.9 VITAMIN D DEFICIENCY, UNSPECIFIED: ICD-10-CM

## 2022-02-03 DIAGNOSIS — E78.9 DISORDER OF LIPOPROTEIN METABOLISM, UNSPECIFIED: ICD-10-CM

## 2022-02-03 DIAGNOSIS — D75.839 THROMBOCYTOSIS, UNSPECIFIED: ICD-10-CM

## 2022-02-03 PROCEDURE — 36415 COLL VENOUS BLD VENIPUNCTURE: CPT

## 2022-02-03 PROCEDURE — 99396 PREV VISIT EST AGE 40-64: CPT | Mod: 25

## 2022-02-03 RX ORDER — CHLORHEXIDINE GLUCONATE 4 %
325 (65 FE) LIQUID (ML) TOPICAL DAILY
Qty: 30 | Refills: 5 | Status: DISCONTINUED | COMMUNITY
Start: 2021-02-22 | End: 2022-02-03

## 2022-02-03 NOTE — PLAN
[FreeTextEntry1] : In regards to patients Physical exam, routine blood work drawn, will review results with patient.\par \par Regarding patient's obesity\par - encourage lifestyle modifications\par - diet and exercise\par - reduce calorie intake\par - no soda/ junk food/ snacks\par - consider mixed grains/ whole grains, leafy vegetables, and an appropriate serving of protein \par \par Patient prescribed Plenity (cellulose and citric acid)\par \par Counseling included abnormal lab results, differential diagnoses, treatment options, risks and benefits, lifestyle changes, current condition, medications, and dose adjustments. \par The patient was interactive, attentive, asked questions, and verbalized understanding

## 2022-02-03 NOTE — HEALTH RISK ASSESSMENT
08/17/21        Jessica DIAZ BECC  [address] [place] [address]      Dear Jessica,    Please review the enclosed prep instructions for your procedure with Walt Chapman MD     Date of Procedure: {MONTH:100580} {DAYS 1-31:329461}, {YEARS 2014 ON:076172}  Time of Procedure:  ***  Location:  {Pre Admit facilities:[facility]}      Important:   · You will need someone to drive you home and remain with you or check on you up to 24 hours after you go home.  · If you take blood thinners (i.e. Warfarin, Plavix, Xarelto, Eliquis, Aggrenox, Effient, Pradaxa) please call your Primary Care Provider/Cardiologist at least 10 days before your procedure for instructions on how many days before to hold your blood thinner.   · If you are diabetic and take insulin, please call your Primary Care Provider for instructions. Usually, it is recommended that you take half of your insulin dose the evening before and half your insulin dose the morning of your procedure.   · If you are taking the diet drug Phentermine, stop taking it 14 days before your procedure.   · The Pre-admission nurse will tell you which medications to take the morning of your procedure with a small sip of water. Do not take any other medications until after your procedure.    If you have any questions regarding these instructions or need to reschedule, please contact me at the telephone number and extension listed below.     Thank you,    {schedulers:[id number]}  Surgery Scheduler for Walt Chapman MD  [facility] Pre-Admit Department                Colonoscopy with Nulytely Split Dose and Dulcolax  Pre-Procedure Instructions     Please pick-up a Nulytely Prep Kit and a small box of 5 mg Dulcolax (Bisacodyl) tablets within 1 week at the pharmacy your physician sent the prescription for the Nulytely Kit to. You do not need a prescription for Dulcolax. Purchase a small box as you will only need 2 tablets. Bring these instructions with you when you  [Never] : Never [Yes] : Yes go to the pharmacy. Do not mix the Nulytely solution until the day before the procedure.    Transportation:  · Make arrangements for someone (over 18 years of age) to drive you home after the procedure because you will be very drowsy. Please make these arrangements in advance. A taxi is not acceptable transportation. If you do not have transportation arranged, we will not be able to do the colonoscopy.  · Make arrangements for someone to stay with you or check in on you frequently the rest of the day/evening until the drowsiness has completely worn off.     Cancellation or Rescheduling:  · Due to everyone's busy schedules, it is important that you keep your appointment. If you must reschedule or cancel your appointment, please notify your physician's office at least 48 hours in advance.  After your Colonoscopy:  You will receive after-procedure information and instructions. In addition:  · Do not plan on going to work or doing anything for the rest of the day.  · You may resume eating and drinking with no limitations following the procedure, unless otherwise stated.  10 days Before Your Procedure:  · If you take blood thinners (i.e. Warfarin, Plavix, Xarelto, Eliquis, Aggrenox, Effient, Pradaxa) please call your Primary Care Provider/Cardiologist at least 10 days before your procedure for instructions on how many days before to hold your blood thinner.    1 Week/ 7 days Before Your Procedure:  · Do not eat popcorn, seeds, nuts or whole kernel corn.   · Do not take Carafate, iron supplements, Fish Oil, or Pepto-Bismol.   · Have clear liquids available to drink at home. See “Clear Liquid Diet Suggestion” sheet.  ·  the prep from pharmacy. Do not mix it until the day you need to start the colon prep.     5 Days Before Your Procedure:   · Do not eat products with [place] (a fat substitute found in [employer] and [employer] Fat-Free chips) for 5 days before the procedure.    Colon Preparation:  · To  [Monthly or less (1 pt)] : Monthly or less (1 point) complete a successful colonoscopy, the bowel must be clean so that the physician can clearly view the colon. It is very important that you read all the instructions well in advance of the procedure. Without proper preparation, the colonoscopy will not be successful, and the test may have to be repeated.              1 Day Before Your Procedure:  · You may have only clear liquids to eat or drink all day long. You may not have any solid foods or dairy products, and you may not eat or drink anything that is red or purple. The more clear liquids you drink, the better off you will be. See attached “Clear Liquid Diet Suggestion” sheet.  · Do not drink any alcoholic beverages for at least 24 hours before the procedure.  · Do not take any other laxatives such as: Lomotil, Imodium, Effer-syllium, Metamucil, Citrucel, Konsyl, Hydrocil, or FiberCon.  · In the morning, mix the Nulytely prep with the flavor packet (if applicable) and one gallon of water, shake well to mix, and refrigerate to chill. If preferred, you may add some Gatorade, Powerade, or crystal light (no red, orange, or purple) with the water to add flavor, without adding more than one gallon of fluid to the prep. Do not further dilute the prep in any way.  · At 4:00 pm, start drinking half of the Nulytely prep. Drink one large (8-10 oz.) glass every 10-15 minutes until ½ of the gallon is gone. Using a straw to finish the drink may be helpful.  · In most cases, loose, liquidy bowel movements will begin within 30 minutes to one hour. You should remain within easy access of a bathroom. Continue to drink the prep regardless of stool frequency. It is normal for this to cause chills.   · You will drink half of the Nulytely prep now.  The remaining 1/2 gallon will be used tomorrow.  Put the remaining prep solution in the refrigerator.   · At 7:00 pm, take 2 Dulcolax laxative tablets. It is normal for this to cause some abdominal cramping.    · Continue to drink clear  [1 or 2 (0 pts)] : 1 or 2 (0 points) liquids throughout the evening (see “Clear Liquid Diet Suggestion” sheet).  · Should you experience rectal irritation due to frequent stooling, you may apply Desitin, Balmex, Vitamin A&D ointment or other similar product to the irritated area.    Day of Your Procedure:  · Starting 4 hours before your procedure drink the remainder of Nulytely prep over 1 hour, drinking one large (8-10 oz.) glass every 10-15 minutes until it is gone.  · Do not eat anything after you finish the prep.  · You must not have anything to drink 2 hours before your arrival.   · You will be informed by the Pre-admission Testing nurse which medications to take the morning of your procedure with a small sip of water only. Do not take any other medications until after your procedure.   · In the morning, your stools should have a clear to see-through cloudy yellow appearance with no formed substance. If your stools are darker or have formed substance, please call the location where your procedure is scheduled to be done and ask for the pre-op nurse, who will get further instructions from your physician.                  Clear Liquid Diet Suggestion Sheet    Preparation:  You have received instructions that explain what you need to do to prepare for your colonoscopy. Your colon must be empty for the colonoscopy to be thorough and safe. To prepare for the procedure, you will have to follow a liquid diet for 1 to 3 days beforehand. The longer you do the clear liquid diet, the better off you will be. The liquid diet should be clear and not contain food colorings (Red, Orange, or Purple) and may include:    • Bouillon or broth  • Strained/ Pulp free fruit juices (lemonade, apple, or white grape)  • Water  • Plain Coffee (no dairy products, but you can add sugar)  • Plain Tea (no milk or creamers or dairy products)   • White Soda  • Gelatin/ Jell-O (no red, orange, purple)  • Popsicles  • Gatorade, Powerade, etc.  • Clear Hard Candy    No alcoholic  [Never (0 pts)] : Never (0 points) beverages for at least 24 hours before your procedure.   An example of a typical menu on the Clear Liquid Diet may look like this:  Breakfast:  Snack:    · 1 glass of pulp-free fruit juice and/or water • 1 popsicle   · 1 bowl gelatin/ Jell-O • 1 cup of coffee or tea, no dairy products and/or water/ white soda   · 1 cup of coffee or tea, no dairy products  • Sugar or honey is acceptable   · Sugar or honey is acceptable    Snack: Dinner:    · 1 glass of pulp-free fruit juice and/or water · 1 glass of pulp-free fruit juice and/or water   · 1 bowl gelatin/ Jell-O · 1 cup broth    · 1 bowl gelatin/ Jell-O   Lunch:     · 1 glass of pulp-free fruit juice and/or water    · 1 cup broth    · 1 bowl gelatin/ Jell-O      Thank you for choosing [facility]   [No] : In the past 12 months have you used drugs other than those required for medical reasons? No [0] : 2) Feeling down, depressed, or hopeless: Not at all (0) [Patient refused screening] : Patient refused screening [PHQ-2 Negative - No further assessment needed] : PHQ-2 Negative - No further assessment needed [Patient declined Low Dose CT Scan] : Patient declined Low Dose CT Scan [Patient declined Retinal Exam] : Patient declined Retinal Exam. [Patient reported mammogram was normal] : Patient reported mammogram was normal [HIV test declined] : HIV test declined [Hepatitis C test declined] : Hepatitis C test declined [Audit-CScore] : 1 [KCA4Kastb] : 0 [MammogramDate] : 02/22

## 2022-02-03 NOTE — HISTORY OF PRESENT ILLNESS
[FreeTextEntry1] : physical exam.  [de-identified] : Ms. ARIADNE DESOUZA is a 41 year female with a PMH of HTN,  Asthma, Anxiety comes to the office for physical exam. Patient denies fever, cough SOB. No other complaints at this time.

## 2022-02-04 LAB
25(OH)D3 SERPL-MCNC: 25.5 NG/ML
ALBUMIN SERPL ELPH-MCNC: 4.8 G/DL
ALP BLD-CCNC: 75 U/L
ALT SERPL-CCNC: 17 U/L
ANION GAP SERPL CALC-SCNC: 14 MMOL/L
AST SERPL-CCNC: 23 U/L
BASOPHILS # BLD AUTO: 0.05 K/UL
BASOPHILS NFR BLD AUTO: 0.6 %
BILIRUB SERPL-MCNC: 0.4 MG/DL
BUN SERPL-MCNC: 10 MG/DL
CALCIUM SERPL-MCNC: 10.2 MG/DL
CHLORIDE SERPL-SCNC: 99 MMOL/L
CHOLEST SERPL-MCNC: 129 MG/DL
CO2 SERPL-SCNC: 24 MMOL/L
CREAT SERPL-MCNC: 0.93 MG/DL
EOSINOPHIL # BLD AUTO: 0.14 K/UL
EOSINOPHIL NFR BLD AUTO: 1.6 %
ESTIMATED AVERAGE GLUCOSE: 103 MG/DL
GLUCOSE SERPL-MCNC: 127 MG/DL
HBA1C MFR BLD HPLC: 5.2 %
HCT VFR BLD CALC: 35.6 %
HDLC SERPL-MCNC: 51 MG/DL
HGB BLD-MCNC: 10.8 G/DL
IMM GRANULOCYTES NFR BLD AUTO: 0.2 %
LDLC SERPL CALC-MCNC: 50 MG/DL
LYMPHOCYTES # BLD AUTO: 1.19 K/UL
LYMPHOCYTES NFR BLD AUTO: 13.7 %
MAN DIFF?: NORMAL
MCHC RBC-ENTMCNC: 24.5 PG
MCHC RBC-ENTMCNC: 30.3 GM/DL
MCV RBC AUTO: 80.9 FL
MONOCYTES # BLD AUTO: 0.53 K/UL
MONOCYTES NFR BLD AUTO: 6.1 %
NEUTROPHILS # BLD AUTO: 6.78 K/UL
NEUTROPHILS NFR BLD AUTO: 77.8 %
NONHDLC SERPL-MCNC: 78 MG/DL
PLATELET # BLD AUTO: 467 K/UL
POTASSIUM SERPL-SCNC: 3.4 MMOL/L
PROT SERPL-MCNC: 7.5 G/DL
RBC # BLD: 4.4 M/UL
RBC # FLD: 17.6 %
SODIUM SERPL-SCNC: 137 MMOL/L
TRIGL SERPL-MCNC: 140 MG/DL
TSH SERPL-ACNC: 1.39 UIU/ML
WBC # FLD AUTO: 8.71 K/UL

## 2022-03-24 ENCOUNTER — RESULT REVIEW (OUTPATIENT)
Age: 42
End: 2022-03-24

## 2022-04-15 ENCOUNTER — RX CHANGE (OUTPATIENT)
Age: 42
End: 2022-04-15

## 2022-04-15 RX ORDER — TRAZODONE HYDROCHLORIDE 100 MG/1
100 TABLET ORAL
Qty: 30 | Refills: 0 | Status: DISCONTINUED | COMMUNITY
Start: 2020-09-29 | End: 2022-04-15

## 2022-04-19 ENCOUNTER — APPOINTMENT (OUTPATIENT)
Dept: ORTHOPEDIC SURGERY | Facility: CLINIC | Age: 42
End: 2022-04-19
Payer: COMMERCIAL

## 2022-04-19 VITALS — BODY MASS INDEX: 32.24 KG/M2 | WEIGHT: 203 LBS | HEIGHT: 66.5 IN

## 2022-04-19 PROCEDURE — 99214 OFFICE O/P EST MOD 30 MIN: CPT

## 2022-04-19 NOTE — IMAGING

## 2022-04-19 NOTE — ASSESSMENT
[FreeTextEntry1] : \par Bilateral  X-Ray Examination of the KNEE (4 views): medial and patellofemoral degenerate changes.\par \par - We discussed their diagnosis and treatment options at length including surgical and non-surgical options.\par - We will continue conservative treatment with activity modification, PT, icing, weight loss, and anti-inflammatory medications.\par - The patient was provided with a PT prescription to work on ROM, hip ER/abductors strengthening, quad/hamstring stretches and strengthening, and other exercises \par - The patient was advised to let pain guide the gradual advancement of activities. \par - we spoke about possibility of viscosupplementation injections and will submit for auth\par - mdp\par - Discussed the possible side effects of medication along with the timing and frequency for taking.\par \par

## 2022-04-19 NOTE — HISTORY OF PRESENT ILLNESS
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 pain located at the\par lateral aspect of bilateral knees with associated clicking and catching. worse stairs and running, better at rest. tried\par HEP, Advil and icing with some relief.\par \par 4/19/2022-  had b/l CSI (2/8) and sent for PT but worse L>R

## 2022-04-22 ENCOUNTER — APPOINTMENT (OUTPATIENT)
Dept: INTERNAL MEDICINE | Facility: CLINIC | Age: 42
End: 2022-04-22

## 2022-07-19 ENCOUNTER — APPOINTMENT (OUTPATIENT)
Dept: ORTHOPEDIC SURGERY | Facility: CLINIC | Age: 42
End: 2022-07-19

## 2022-07-19 VITALS — HEIGHT: 66.5 IN | WEIGHT: 203 LBS | BODY MASS INDEX: 32.24 KG/M2

## 2022-07-19 PROCEDURE — 20610 DRAIN/INJ JOINT/BURSA W/O US: CPT | Mod: 50

## 2022-07-19 PROCEDURE — 99214 OFFICE O/P EST MOD 30 MIN: CPT | Mod: 25

## 2022-07-19 NOTE — ASSESSMENT
[FreeTextEntry1] : \par \par - We discussed their diagnosis and treatment options at length including surgical and non-surgical options.\par - We will continue conservative treatment with activity modification, PT, icing, weight loss, and anti-inflammatory medications.\par - The patient was provided with a PT prescription to work on ROM, hip ER/abductors strengthening, quad/hamstring stretches and strengthening, and other exercises \par - The patient was advised to let pain guide the gradual advancement of activities. \par - we spoke about possibility of viscosupplementation injections and they want to proceed\par - b/l euflexxa #1 given, rosette well\par \par \par

## 2022-07-19 NOTE — IMAGING

## 2022-07-19 NOTE — HISTORY OF PRESENT ILLNESS
[de-identified] : 42 year old female  ((, runner) bilateral knee pain L>R since 2020, no SOLO, worse recently. pain located at the\par lateral aspect of bilateral knees with associated clicking and catching. worse stairs and running, better at rest. tried\par HEP, Advil and icing with some relief.\par \par 4/19/2022-  had b/l CSI (2/8) and sent for PT but worse L>R\par 7/19/22 - wants to discuss poss b/l visco

## 2022-07-26 ENCOUNTER — APPOINTMENT (OUTPATIENT)
Dept: ORTHOPEDIC SURGERY | Facility: CLINIC | Age: 42
End: 2022-07-26

## 2022-07-26 PROCEDURE — 99212 OFFICE O/P EST SF 10 MIN: CPT | Mod: 25

## 2022-07-26 PROCEDURE — 20610 DRAIN/INJ JOINT/BURSA W/O US: CPT | Mod: 50

## 2022-07-26 NOTE — IMAGING
[de-identified] : \par RIGHT KNEE\par Inspection:  mild effusion\par Palpation: medial joint line tenderness, anterior tenderness\par Knee Range of Motion:  3-125 \par Strength: 5/5 Quadriceps strength, 5/5 Hamstring strength\par Neurological: light touch is intact throughout\par Ligament Stability and Special Tests: \par McMurrays: neg\par Lachman: neg\par Pivot Shift: neg\par Posterior Drawer: neg\par Valgus: neg\par Varus: neg\par Patella Apprehension: neg\par Patella Maltracking: neg\par \par \par  LEFT KNEE\par Inspection:  mild effusion\par Palpation: medial joint line tenderness, anterior tenderness\par Knee Range of Motion:  3-125 \par Strength: 5/5 Quadriceps strength, 5/5 Hamstring strength\par Neurological: light touch is intact throughout\par Ligament Stability and Special Tests: \par McMurrays: neg\par Lachman: neg\par Pivot Shift: neg\par Posterior Drawer: neg\par Valgus: neg\par Varus: neg\par Patella Apprehension: neg\par Patella Maltracking: neg\par \par

## 2022-07-26 NOTE — ASSESSMENT
[FreeTextEntry1] : \par \par - We discussed their diagnosis and treatment options at length including surgical and non-surgical options.\par - We will continue conservative treatment with activity modification, PT, icing, weight loss, and anti-inflammatory medications.\par - The patient was provided with a PT prescription to work on ROM, hip ER/abductors strengthening, quad/hamstring stretches and strengthening, and other exercises \par - The patient was advised to let pain guide the gradual advancement of activities. \par - we spoke about possibility of viscosupplementation injections and they want to proceed\par - b/l euflexxa #2 given, rosette well\par \par \par

## 2022-07-26 NOTE — PROCEDURE
[FreeTextEntry3] : \par Injection Procedure Note:\par \par The risks, benefits, and alternatives to viscosupplementation injection were reviewed with the patient. Risks outlined include but are not limited to infection, sepsis, bleeding, scarring, temporary increase in pain, syncopal episode, failure to resolve symptoms, symptoms recurrence, allergic reaction, flare reaction, pseudoseptic reaction.  Patient understood the risks and asked to proceed with this treatment course.\par \par Patient Identification\par Name/: Verbal with patient and/or family\par \par Procedure Verification:\par Procedure confirmed with patient or family/designee\par Consent for procedure: Verbal Consent Given\par Relevant documentation completed, reviewed, and signed\par Clinical indications for procedure confirmed\par \par Time-out with all members of procedure team immediately prior to procedure:\par Correct patient identified. Agreement on procedure. Correct side and site.\par \par \par KNEE INJECTION (Visco) - BILATERAL\par After verbal consent and identification of the correct patient and correct site, bilateral knees were prepped using alcohol swabs and betadine. This was allowed time to air dry. \par An injection of Euflexxa 2ml  #2 \par was injected into the knees using a sterile 22G needle after ethyl chloride spray for skin anesthesia. The patient tolerated the procedure well. After-care instructions were provided and included instructions to ice the area and to call if redness, pain, or fever develop.\par

## 2022-07-26 NOTE — HISTORY OF PRESENT ILLNESS
[de-identified] : 42 year old female  ((, runner) bilateral knee pain L>R since 2020, no SOLO, worse recently. pain located at the\par lateral aspect of bilateral knees with associated clicking and catching. worse stairs and running, better at rest. tried\par HEP, Advil and icing with some relief.\par \par 4/19/2022-  had b/l CSI (2/8) and sent for PT but worse L>R\par 7/19/22 - wants to discuss poss b/l visco\par 7/25/22 - b/l euflexxa #2

## 2022-08-02 ENCOUNTER — APPOINTMENT (OUTPATIENT)
Dept: ORTHOPEDIC SURGERY | Facility: CLINIC | Age: 42
End: 2022-08-02

## 2022-08-02 PROCEDURE — 99212 OFFICE O/P EST SF 10 MIN: CPT | Mod: 25

## 2022-08-02 PROCEDURE — 20610 DRAIN/INJ JOINT/BURSA W/O US: CPT | Mod: 50

## 2022-08-02 NOTE — ASSESSMENT
[FreeTextEntry1] : \par \par - We discussed their diagnosis and treatment options at length including surgical and non-surgical options.\par - We will continue conservative treatment with activity modification, PT, icing, weight loss, and anti-inflammatory medications.\par - The patient was provided with a PT prescription to work on ROM, hip ER/abductors strengthening, quad/hamstring stretches and strengthening, and other exercises \par - The patient was advised to let pain guide the gradual advancement of activities. \par - we spoke about possibility of viscosupplementation injections and they want to proceed\par - b/l euflexxa #3 given, rosette well\par \par \par

## 2022-08-02 NOTE — HISTORY OF PRESENT ILLNESS
[de-identified] : 42 year old female  ((, runner) bilateral knee pain L>R since 2020, no SOLO, worse recently. pain located at the\par lateral aspect of bilateral knees with associated clicking and catching. worse stairs and running, better at rest. tried\par HEP, Advil and icing with some relief.\par \par 4/19/2022-  had b/l CSI (2/8) and sent for PT but worse L>R\par 7/19/22 - wants to discuss poss b/l visco\par 7/25/22 - b/l euflexxa #2\par 8/2/22 - b/l euflexxa #3

## 2022-08-02 NOTE — PROCEDURE
[FreeTextEntry3] : \par Injection Procedure Note:\par \par The risks, benefits, and alternatives to viscosupplementation injection were reviewed with the patient. Risks outlined include but are not limited to infection, sepsis, bleeding, scarring, temporary increase in pain, syncopal episode, failure to resolve symptoms, symptoms recurrence, allergic reaction, flare reaction, pseudoseptic reaction.  Patient understood the risks and asked to proceed with this treatment course.\par \par Patient Identification\par Name/: Verbal with patient and/or family\par \par Procedure Verification:\par Procedure confirmed with patient or family/designee\par Consent for procedure: Verbal Consent Given\par Relevant documentation completed, reviewed, and signed\par Clinical indications for procedure confirmed\par \par Time-out with all members of procedure team immediately prior to procedure:\par Correct patient identified. Agreement on procedure. Correct side and site.\par \par \par KNEE INJECTION (Visco) - BILATERAL\par After verbal consent and identification of the correct patient and correct site, bilateral knees were prepped using alcohol swabs and betadine. This was allowed time to air dry. \par An injection of Euflexxa 2ml  #3 \par was injected into the knees using a sterile 22G needle after ethyl chloride spray for skin anesthesia. The patient tolerated the procedure well. After-care instructions were provided and included instructions to ice the area and to call if redness, pain, or fever develop.\par

## 2022-08-31 ENCOUNTER — RX RENEWAL (OUTPATIENT)
Age: 42
End: 2022-08-31

## 2022-10-06 ENCOUNTER — LABORATORY RESULT (OUTPATIENT)
Age: 42
End: 2022-10-06

## 2022-10-06 ENCOUNTER — APPOINTMENT (OUTPATIENT)
Dept: INTERNAL MEDICINE | Facility: CLINIC | Age: 42
End: 2022-10-06

## 2022-10-06 VITALS
HEIGHT: 66.5 IN | DIASTOLIC BLOOD PRESSURE: 75 MMHG | SYSTOLIC BLOOD PRESSURE: 109 MMHG | BODY MASS INDEX: 32.24 KG/M2 | WEIGHT: 203 LBS | HEART RATE: 95 BPM

## 2022-10-06 DIAGNOSIS — G89.29 LOW BACK PAIN, UNSPECIFIED: ICD-10-CM

## 2022-10-06 DIAGNOSIS — M54.50 LOW BACK PAIN, UNSPECIFIED: ICD-10-CM

## 2022-10-06 DIAGNOSIS — Z23 ENCOUNTER FOR IMMUNIZATION: ICD-10-CM

## 2022-10-06 DIAGNOSIS — M25.50 PAIN IN UNSPECIFIED JOINT: ICD-10-CM

## 2022-10-06 DIAGNOSIS — L80 VITILIGO: ICD-10-CM

## 2022-10-06 DIAGNOSIS — L65.9 NONSCARRING HAIR LOSS, UNSPECIFIED: ICD-10-CM

## 2022-10-06 PROCEDURE — 99214 OFFICE O/P EST MOD 30 MIN: CPT | Mod: 25

## 2022-10-06 PROCEDURE — 90686 IIV4 VACC NO PRSV 0.5 ML IM: CPT

## 2022-10-06 PROCEDURE — G0008: CPT

## 2022-10-06 PROCEDURE — 36415 COLL VENOUS BLD VENIPUNCTURE: CPT

## 2022-10-06 RX ORDER — DILTIAZEM HYDROCHLORIDE 180 MG/1
180 CAPSULE, EXTENDED RELEASE ORAL
Qty: 30 | Refills: 2 | Status: COMPLETED | COMMUNITY
Start: 2021-04-08 | End: 2022-10-06

## 2022-10-06 RX ORDER — LEVOTHYROXINE SODIUM 0.1 MG/1
100 TABLET ORAL DAILY
Qty: 90 | Refills: 3 | Status: COMPLETED | COMMUNITY
End: 2022-10-06

## 2022-10-06 RX ORDER — METHYLPREDNISOLONE 4 MG/1
4 TABLET ORAL
Qty: 1 | Refills: 0 | Status: COMPLETED | COMMUNITY
Start: 2022-04-19 | End: 2022-10-06

## 2022-10-06 RX ORDER — CARBOXYMETHYLCELLULOSE/CITRIC 0.75 G
CAPSULE ORAL DAILY
Qty: 90 | Refills: 0 | Status: COMPLETED | COMMUNITY
Start: 2022-02-03 | End: 2022-10-06

## 2022-10-06 NOTE — HISTORY OF PRESENT ILLNESS
[FreeTextEntry1] : hair loss skin rash [de-identified] : Ms. ARIADNE DESOUZA is a 42 year female with a PMH of HTN,  Asthma, Anxiety comes to the office for  c/o discoloration of skin on face and hair loss over the last 3-4  months. Patient also c/o 3 month history of joint pain in multiple joints and fatigue.\par \par Of note- patient has discontinued her thyroid medication. \par \par Patient denies fever, cough SOB. No other complaints at this time.

## 2022-10-06 NOTE — HEALTH RISK ASSESSMENT
[0] : 2) Feeling down, depressed, or hopeless: Not at all (0) [PHQ-2 Negative - No further assessment needed] : PHQ-2 Negative - No further assessment needed [[id number]] : 0

## 2022-10-06 NOTE — PLAN
[FreeTextEntry1] : Patient received flu vaccine today. Patient advised of adverse effects of medication including but not limited to muscle soreness at site of injection and general malaise \par \par Fatigue/arthralgia of multiple joints -will test blood work and call patient with results \par \par Hair loss/vitiligo- patient was referred to dermatologist\par \par chronic lower back pain- Patient denies urinary incontinence, bowel incontinence, leg weakness or saddle anesthesia \par patient was referred to pain management \par \par Regarding patient's obesity\par - encourage lifestyle modifications\par - diet and exercise\par - reduce calorie intake\par - no soda/ junk food/ snacks\par - consider mixed grains/ whole grains, leafy vegetables, and an appropriate serving of protein \par \par Prior to appointment and during encounter with patient extensive medical records were reviewed including but not limited to, Hospital records records, out patient records, laboratory data and microbiology data \par In addition extensive time was also spent in reviewing diagnostic studies.\par \par Total encounter total time 30 mins\par >50% of time spent counseling/coordinating care\par \par \par Counseling included abnormal lab results, differential diagnoses, treatment options, risks and benefits, lifestyle changes, current condition, medications, and dose adjustments. \par The patient was interactive, attentive, asked questions, and verbalized understanding

## 2022-10-07 LAB
25(OH)D3 SERPL-MCNC: 50.6 NG/ML
ALBUMIN SERPL ELPH-MCNC: 4.2 G/DL
ALP BLD-CCNC: 71 U/L
ALT SERPL-CCNC: 7 U/L
ANION GAP SERPL CALC-SCNC: 15 MMOL/L
AST SERPL-CCNC: 10 U/L
BASOPHILS # BLD AUTO: 0 K/UL
BASOPHILS NFR BLD AUTO: 0 %
BILIRUB SERPL-MCNC: <0.2 MG/DL
BUN SERPL-MCNC: 12 MG/DL
CALCIUM SERPL-MCNC: 9.6 MG/DL
CCP AB SER IA-ACNC: 11 UNITS
CENTROMERE IGG SER-ACNC: <0.2 CD:130001892
CHLORIDE SERPL-SCNC: 101 MMOL/L
CHOLEST SERPL-MCNC: 126 MG/DL
CO2 SERPL-SCNC: 23 MMOL/L
CREAT SERPL-MCNC: 0.82 MG/DL
CRP SERPL-MCNC: 8 MG/L
EGFR: 91 ML/MIN/1.73M2
EOSINOPHIL # BLD AUTO: 0.14 K/UL
EOSINOPHIL NFR BLD AUTO: 1.7 %
ERYTHROCYTE [SEDIMENTATION RATE] IN BLOOD BY WESTERGREN METHOD: 69 MM/HR
ESTIMATED AVERAGE GLUCOSE: 100 MG/DL
GLUCOSE SERPL-MCNC: 83 MG/DL
HAV IGM SER QL: NONREACTIVE
HBA1C MFR BLD HPLC: 5.1 %
HBV CORE IGM SER QL: NONREACTIVE
HBV SURFACE AG SER QL: NONREACTIVE
HCT VFR BLD CALC: 30.6 %
HCV AB SER QL: NONREACTIVE
HCV S/CO RATIO: 0.06 S/CO
HDLC SERPL-MCNC: 50 MG/DL
HGB BLD-MCNC: 8.7 G/DL
LDLC SERPL CALC-MCNC: 50 MG/DL
LYMPHOCYTES # BLD AUTO: 1.96 K/UL
LYMPHOCYTES NFR BLD AUTO: 23.1 %
MAN DIFF?: NORMAL
MCHC RBC-ENTMCNC: 20.2 PG
MCHC RBC-ENTMCNC: 28.4 GM/DL
MCV RBC AUTO: 71.2 FL
MONOCYTES # BLD AUTO: 0.36 K/UL
MONOCYTES NFR BLD AUTO: 4.3 %
NEUTROPHILS # BLD AUTO: 6.01 K/UL
NEUTROPHILS NFR BLD AUTO: 70.9 %
NONHDLC SERPL-MCNC: 77 MG/DL
PLATELET # BLD AUTO: 492 K/UL
POTASSIUM SERPL-SCNC: 3.9 MMOL/L
PROT SERPL-MCNC: 7.2 G/DL
RBC # BLD: 4.3 M/UL
RBC # FLD: 21.4 %
RF+CCP IGG SER-IMP: NEGATIVE
RHEUMATOID FACT SER QL: <10 IU/ML
SODIUM SERPL-SCNC: 139 MMOL/L
TRIGL SERPL-MCNC: 136 MG/DL
TSH SERPL-ACNC: 1.54 UIU/ML
URATE SERPL-MCNC: 5.7 MG/DL
WBC # FLD AUTO: 8.47 K/UL

## 2022-10-10 LAB — ANA SER IF-ACNC: NEGATIVE

## 2022-10-19 ENCOUNTER — RX RENEWAL (OUTPATIENT)
Age: 42
End: 2022-10-19

## 2022-10-21 NOTE — ED STATDOCS - RESPIRATORY, MLM
Left message for mother okay to bring Rob at 2pm on 10/28/22 with other siblings.   
Okay to put on nurse schedule at same time another sibling is see by Dr. Castro.    
Patients sibling is coming in for a flu shot and vaccines on the 28th of October and the patients mother was wondering if the patient can get their flu shot at the same time.   
breath sounds clear and equal bilaterally.

## 2022-10-26 ENCOUNTER — APPOINTMENT (OUTPATIENT)
Dept: UROLOGY | Facility: CLINIC | Age: 42
End: 2022-10-26

## 2022-11-01 ENCOUNTER — APPOINTMENT (OUTPATIENT)
Dept: UROLOGY | Facility: CLINIC | Age: 42
End: 2022-11-01

## 2022-11-01 VITALS — DIASTOLIC BLOOD PRESSURE: 78 MMHG | SYSTOLIC BLOOD PRESSURE: 121 MMHG | HEART RATE: 94 BPM

## 2022-11-01 DIAGNOSIS — N20.1 CALCULUS OF URETER: ICD-10-CM

## 2022-11-01 DIAGNOSIS — R39.15 URGENCY OF URINATION: ICD-10-CM

## 2022-11-01 LAB
BILIRUB UR QL STRIP: NORMAL
CLARITY UR: CLEAR
COLLECTION METHOD: NORMAL
GLUCOSE UR-MCNC: NORMAL
HCG UR QL: 0.2 EU/DL
HGB UR QL STRIP.AUTO: ABNORMAL
KETONES UR-MCNC: ABNORMAL
LEUKOCYTE ESTERASE UR QL STRIP: NORMAL
NITRITE UR QL STRIP: NORMAL
PH UR STRIP: 5
PROT UR STRIP-MCNC: NORMAL
SP GR UR STRIP: >=1.03

## 2022-11-01 PROCEDURE — 99204 OFFICE O/P NEW MOD 45 MIN: CPT | Mod: 25

## 2022-11-01 PROCEDURE — 81003 URINALYSIS AUTO W/O SCOPE: CPT | Mod: QW

## 2022-11-01 RX ORDER — HYALURONATE SODIUM 20 MG/2 ML
20 SYRINGE (ML) INTRAARTICULAR
Qty: 6 | Refills: 0 | Status: DISCONTINUED | COMMUNITY
Start: 2022-05-13 | End: 2022-11-01

## 2022-11-01 RX ORDER — FLUTICASONE PROPIONATE AND SALMETEROL 250; 50 UG/1; UG/1
250-50 POWDER RESPIRATORY (INHALATION)
Qty: 180 | Refills: 2 | Status: DISCONTINUED | COMMUNITY
Start: 2021-05-26 | End: 2022-11-01

## 2022-11-01 NOTE — LETTER BODY
[Dear  ___] : Dear  [unfilled], [Courtesy Letter:] : I had the pleasure of seeing your patient, [unfilled], in my office today. [Please see my note below.] : Please see my note below. [Sincerely,] : Sincerely, [FreeTextEntry3] : Ed\par \par Milind Loo MD\par [facility]\par  of Urology\par [employer] at [facility]\par

## 2022-11-01 NOTE — HISTORY OF PRESENT ILLNESS
[FreeTextEntry1] : has noticed issues with holding urine for the last year.  She gets severe pain and some urgency with occasional incontinence.  Has noted underwear smelling like urine.  SP pain occasionally with voiding. \par ultrasound by primary in 2020 obtained by primary showed an upper ureteral stone. \par She has been pain free. no gross hematuria.

## 2022-11-01 NOTE — ASSESSMENT
[FreeTextEntry1] : Impression:\par \par history of ureteral stone\par urgency\par \par Plan:\par \par CT st one\par oxybutynin\par follow up 1-2 weeks.

## 2022-11-07 ENCOUNTER — OUTPATIENT (OUTPATIENT)
Dept: OUTPATIENT SERVICES | Facility: HOSPITAL | Age: 42
LOS: 1 days | End: 2022-11-07
Payer: COMMERCIAL

## 2022-11-07 ENCOUNTER — APPOINTMENT (OUTPATIENT)
Dept: CT IMAGING | Facility: CLINIC | Age: 42
End: 2022-11-07

## 2022-11-07 DIAGNOSIS — N20.1 CALCULUS OF URETER: ICD-10-CM

## 2022-11-07 DIAGNOSIS — N20.0 CALCULUS OF KIDNEY: Chronic | ICD-10-CM

## 2022-11-07 DIAGNOSIS — Z98.89 OTHER SPECIFIED POSTPROCEDURAL STATES: Chronic | ICD-10-CM

## 2022-11-07 PROCEDURE — 74176 CT ABD & PELVIS W/O CONTRAST: CPT

## 2022-11-07 PROCEDURE — 74176 CT ABD & PELVIS W/O CONTRAST: CPT | Mod: 26

## 2022-11-15 ENCOUNTER — APPOINTMENT (OUTPATIENT)
Dept: UROLOGY | Facility: CLINIC | Age: 42
End: 2022-11-15

## 2022-11-15 VITALS — SYSTOLIC BLOOD PRESSURE: 106 MMHG | HEART RATE: 92 BPM | DIASTOLIC BLOOD PRESSURE: 73 MMHG

## 2022-11-15 PROCEDURE — 99214 OFFICE O/P EST MOD 30 MIN: CPT

## 2022-11-15 RX ORDER — SOLIFENACIN SUCCINATE 10 MG/1
10 TABLET ORAL
Qty: 30 | Refills: 1 | Status: ACTIVE | COMMUNITY
Start: 2022-11-15 | End: 1900-01-01

## 2022-11-15 NOTE — ASSESSMENT
[FreeTextEntry1] : Impression:\par  \par urinary frequency\par right renal stone\par \par Plan:\par \par stop oxybutynin\par start solifenacin\par follow up in 1 month\par discussed options of stone treatment. Will discuss that further on next visit.

## 2022-11-15 NOTE — HISTORY OF PRESENT ILLNESS
[FreeTextEntry1] : on Oxybutynin 5 mg TID.  Has not noticed any improvement.  Voids about every two hours, maybe more. Has some discomfort if she holds her urine.  no constipation.  One episode of gross hematuria.  Sometimes does not have enough warning to get tot he bathroom\par  to void.  Feels better when she voids.  Has some LLQ pain.

## 2022-11-15 NOTE — LETTER BODY
[Dear  ___] : Dear  [unfilled], [Courtesy Letter:] : I had the pleasure of seeing your patient, [unfilled], in my office today. [Please see my note below.] : Please see my note below. [Sincerely,] : Sincerely, [FreeTextEntry3] : Ed\par \par Milind Loo MD\par [facility]\par  of Urology\par [employer] at [facility]\par

## 2022-11-22 ENCOUNTER — APPOINTMENT (OUTPATIENT)
Dept: DERMATOLOGY | Facility: CLINIC | Age: 42
End: 2022-11-22

## 2022-12-06 ENCOUNTER — RX CHANGE (OUTPATIENT)
Age: 42
End: 2022-12-06

## 2022-12-06 RX ORDER — TRAZODONE HYDROCHLORIDE 100 MG/1
100 TABLET ORAL
Qty: 30 | Refills: 3 | Status: DISCONTINUED | COMMUNITY
Start: 2022-04-15 | End: 2022-12-06

## 2022-12-08 NOTE — ED ADULT NURSE NOTE - CAS TRG GENERAL NORM CIRC DET
Pt was trying to cut open a box with knife and cut L index finger. Denies blood thinners. Not sure if tetanus is up to date.
Capillary refill less/equal to 2 seconds/Strong peripheral pulses

## 2022-12-14 NOTE — ED STATDOCS - CHIEF COMPLAINT
Hpi Title: Evaluation of Skin Lesions The patient is a 40y year old Female complaining of chest pain. Additional History: Patient presents today for full body skin check.

## 2022-12-15 ENCOUNTER — APPOINTMENT (OUTPATIENT)
Dept: UROLOGY | Facility: CLINIC | Age: 42
End: 2022-12-15

## 2023-01-02 ENCOUNTER — RX RENEWAL (OUTPATIENT)
Age: 43
End: 2023-01-02

## 2023-01-27 NOTE — H&P PST ADULT - ANESTHESIA, PREVIOUS REACTION, PROFILE
Pt came in for BP check, BP was elevated at OB/GYN OV today. BP taken manually on LUE was 170/92. Pt was given 5 minutes to rest, BP was taken again manually on LUE was 148/82. Pt was informed that she would need to see provider. Pt verbalized understanding.    
nausea/vomiting

## 2023-03-05 ENCOUNTER — EMERGENCY (EMERGENCY)
Facility: HOSPITAL | Age: 43
LOS: 0 days | Discharge: HOME | End: 2023-03-06
Attending: EMERGENCY MEDICINE
Payer: COMMERCIAL

## 2023-03-05 VITALS
HEART RATE: 90 BPM | RESPIRATION RATE: 20 BRPM | DIASTOLIC BLOOD PRESSURE: 93 MMHG | TEMPERATURE: 98 F | OXYGEN SATURATION: 100 % | SYSTOLIC BLOOD PRESSURE: 126 MMHG

## 2023-03-05 DIAGNOSIS — R07.89 OTHER CHEST PAIN: ICD-10-CM

## 2023-03-05 DIAGNOSIS — K80.20 CALCULUS OF GALLBLADDER WITHOUT CHOLECYSTITIS WITHOUT OBSTRUCTION: ICD-10-CM

## 2023-03-05 DIAGNOSIS — K58.9 IRRITABLE BOWEL SYNDROME WITHOUT DIARRHEA: ICD-10-CM

## 2023-03-05 DIAGNOSIS — N20.0 CALCULUS OF KIDNEY: Chronic | ICD-10-CM

## 2023-03-05 DIAGNOSIS — Z90.49 ACQUIRED ABSENCE OF OTHER SPECIFIED PARTS OF DIGESTIVE TRACT: ICD-10-CM

## 2023-03-05 DIAGNOSIS — Z88.0 ALLERGY STATUS TO PENICILLIN: ICD-10-CM

## 2023-03-05 DIAGNOSIS — E03.9 HYPOTHYROIDISM, UNSPECIFIED: ICD-10-CM

## 2023-03-05 DIAGNOSIS — Z98.89 OTHER SPECIFIED POSTPROCEDURAL STATES: Chronic | ICD-10-CM

## 2023-03-05 DIAGNOSIS — Z98.890 OTHER SPECIFIED POSTPROCEDURAL STATES: ICD-10-CM

## 2023-03-05 DIAGNOSIS — Z20.822 CONTACT WITH AND (SUSPECTED) EXPOSURE TO COVID-19: ICD-10-CM

## 2023-03-05 DIAGNOSIS — Z88.1 ALLERGY STATUS TO OTHER ANTIBIOTIC AGENTS STATUS: ICD-10-CM

## 2023-03-05 DIAGNOSIS — Z87.442 PERSONAL HISTORY OF URINARY CALCULI: ICD-10-CM

## 2023-03-05 DIAGNOSIS — Z87.19 PERSONAL HISTORY OF OTHER DISEASES OF THE DIGESTIVE SYSTEM: ICD-10-CM

## 2023-03-05 DIAGNOSIS — J45.909 UNSPECIFIED ASTHMA, UNCOMPLICATED: ICD-10-CM

## 2023-03-05 LAB
ALBUMIN SERPL ELPH-MCNC: 3.6 G/DL — SIGNIFICANT CHANGE UP (ref 3.3–5)
ALP SERPL-CCNC: 59 U/L — SIGNIFICANT CHANGE UP (ref 40–120)
ALT FLD-CCNC: 14 U/L — SIGNIFICANT CHANGE UP (ref 12–78)
ANION GAP SERPL CALC-SCNC: 4 MMOL/L — LOW (ref 5–17)
AST SERPL-CCNC: 12 U/L — LOW (ref 15–37)
BASOPHILS # BLD AUTO: 0.06 K/UL — SIGNIFICANT CHANGE UP (ref 0–0.2)
BASOPHILS NFR BLD AUTO: 0.6 % — SIGNIFICANT CHANGE UP (ref 0–2)
BILIRUB SERPL-MCNC: 0.2 MG/DL — SIGNIFICANT CHANGE UP (ref 0.2–1.2)
BUN SERPL-MCNC: 17 MG/DL — SIGNIFICANT CHANGE UP (ref 7–23)
CALCIUM SERPL-MCNC: 9.2 MG/DL — SIGNIFICANT CHANGE UP (ref 8.5–10.1)
CHLORIDE SERPL-SCNC: 104 MMOL/L — SIGNIFICANT CHANGE UP (ref 96–108)
CO2 SERPL-SCNC: 28 MMOL/L — SIGNIFICANT CHANGE UP (ref 22–31)
CREAT SERPL-MCNC: 0.96 MG/DL — SIGNIFICANT CHANGE UP (ref 0.5–1.3)
D DIMER BLD IA.RAPID-MCNC: <150 NG/ML DDU — SIGNIFICANT CHANGE UP
EGFR: 75 ML/MIN/1.73M2 — SIGNIFICANT CHANGE UP
EOSINOPHIL # BLD AUTO: 0.22 K/UL — SIGNIFICANT CHANGE UP (ref 0–0.5)
EOSINOPHIL NFR BLD AUTO: 2.3 % — SIGNIFICANT CHANGE UP (ref 0–6)
FLUAV AG NPH QL: SIGNIFICANT CHANGE UP
FLUBV AG NPH QL: SIGNIFICANT CHANGE UP
GLUCOSE SERPL-MCNC: 95 MG/DL — SIGNIFICANT CHANGE UP (ref 70–99)
HCT VFR BLD CALC: 29.7 % — LOW (ref 34.5–45)
HGB BLD-MCNC: 9.4 G/DL — LOW (ref 11.5–15.5)
IMM GRANULOCYTES NFR BLD AUTO: 0.2 % — SIGNIFICANT CHANGE UP (ref 0–0.9)
LYMPHOCYTES # BLD AUTO: 2.05 K/UL — SIGNIFICANT CHANGE UP (ref 1–3.3)
LYMPHOCYTES # BLD AUTO: 21.8 % — SIGNIFICANT CHANGE UP (ref 13–44)
MCHC RBC-ENTMCNC: 24.5 PG — LOW (ref 27–34)
MCHC RBC-ENTMCNC: 31.6 GM/DL — LOW (ref 32–36)
MCV RBC AUTO: 77.5 FL — LOW (ref 80–100)
MONOCYTES # BLD AUTO: 0.72 K/UL — SIGNIFICANT CHANGE UP (ref 0–0.9)
MONOCYTES NFR BLD AUTO: 7.7 % — SIGNIFICANT CHANGE UP (ref 2–14)
NEUTROPHILS # BLD AUTO: 6.34 K/UL — SIGNIFICANT CHANGE UP (ref 1.8–7.4)
NEUTROPHILS NFR BLD AUTO: 67.4 % — SIGNIFICANT CHANGE UP (ref 43–77)
NT-PROBNP SERPL-SCNC: 9 PG/ML — SIGNIFICANT CHANGE UP (ref 0–125)
PLATELET # BLD AUTO: 415 K/UL — HIGH (ref 150–400)
POTASSIUM SERPL-MCNC: 2.9 MMOL/L — CRITICAL LOW (ref 3.5–5.3)
POTASSIUM SERPL-SCNC: 2.9 MMOL/L — CRITICAL LOW (ref 3.5–5.3)
PROT SERPL-MCNC: 7.5 GM/DL — SIGNIFICANT CHANGE UP (ref 6–8.3)
RBC # BLD: 3.83 M/UL — SIGNIFICANT CHANGE UP (ref 3.8–5.2)
RBC # FLD: 16.3 % — HIGH (ref 10.3–14.5)
RSV RNA NPH QL NAA+NON-PROBE: SIGNIFICANT CHANGE UP
SARS-COV-2 RNA SPEC QL NAA+PROBE: SIGNIFICANT CHANGE UP
SODIUM SERPL-SCNC: 136 MMOL/L — SIGNIFICANT CHANGE UP (ref 135–145)
TROPONIN I, HIGH SENSITIVITY RESULT: 4.21 NG/L — SIGNIFICANT CHANGE UP
WBC # BLD: 9.41 K/UL — SIGNIFICANT CHANGE UP (ref 3.8–10.5)
WBC # FLD AUTO: 9.41 K/UL — SIGNIFICANT CHANGE UP (ref 3.8–10.5)

## 2023-03-05 PROCEDURE — 85379 FIBRIN DEGRADATION QUANT: CPT

## 2023-03-05 PROCEDURE — 93010 ELECTROCARDIOGRAM REPORT: CPT

## 2023-03-05 PROCEDURE — 99285 EMERGENCY DEPT VISIT HI MDM: CPT | Mod: 25

## 2023-03-05 PROCEDURE — 85025 COMPLETE CBC W/AUTO DIFF WBC: CPT

## 2023-03-05 PROCEDURE — 71045 X-RAY EXAM CHEST 1 VIEW: CPT

## 2023-03-05 PROCEDURE — 93005 ELECTROCARDIOGRAM TRACING: CPT

## 2023-03-05 PROCEDURE — 36415 COLL VENOUS BLD VENIPUNCTURE: CPT

## 2023-03-05 PROCEDURE — 84484 ASSAY OF TROPONIN QUANT: CPT

## 2023-03-05 PROCEDURE — 0241U: CPT

## 2023-03-05 PROCEDURE — 80053 COMPREHEN METABOLIC PANEL: CPT

## 2023-03-05 PROCEDURE — 71045 X-RAY EXAM CHEST 1 VIEW: CPT | Mod: 26

## 2023-03-05 PROCEDURE — 99285 EMERGENCY DEPT VISIT HI MDM: CPT

## 2023-03-05 PROCEDURE — 83880 ASSAY OF NATRIURETIC PEPTIDE: CPT

## 2023-03-05 PROCEDURE — 96374 THER/PROPH/DIAG INJ IV PUSH: CPT

## 2023-03-05 PROCEDURE — 96375 TX/PRO/DX INJ NEW DRUG ADDON: CPT

## 2023-03-05 RX ORDER — ACETAMINOPHEN 500 MG
1000 TABLET ORAL ONCE
Refills: 0 | Status: COMPLETED | OUTPATIENT
Start: 2023-03-05 | End: 2023-03-05

## 2023-03-05 RX ORDER — POTASSIUM CHLORIDE 20 MEQ
40 PACKET (EA) ORAL ONCE
Refills: 0 | Status: COMPLETED | OUTPATIENT
Start: 2023-03-05 | End: 2023-03-05

## 2023-03-05 RX ORDER — FAMOTIDINE 10 MG/ML
20 INJECTION INTRAVENOUS ONCE
Refills: 0 | Status: COMPLETED | OUTPATIENT
Start: 2023-03-05 | End: 2023-03-05

## 2023-03-05 RX ORDER — POTASSIUM CHLORIDE 20 MEQ
10 PACKET (EA) ORAL
Refills: 0 | Status: DISCONTINUED | OUTPATIENT
Start: 2023-03-05 | End: 2023-03-05

## 2023-03-05 RX ORDER — POTASSIUM CHLORIDE 20 MEQ
10 PACKET (EA) ORAL ONCE
Refills: 0 | Status: COMPLETED | OUTPATIENT
Start: 2023-03-05 | End: 2023-03-05

## 2023-03-05 RX ADMIN — FAMOTIDINE 20 MILLIGRAM(S): 10 INJECTION INTRAVENOUS at 23:24

## 2023-03-05 RX ADMIN — Medication 400 MILLIGRAM(S): at 23:24

## 2023-03-05 NOTE — ED PROVIDER NOTE - PROGRESS NOTE DETAILS
so dr davey dispo pending on  troponin x 1, if normal may be d/c ekg normal B Brandi FERRARA pt with p[pennyium repleted, pt is on hctz, needs to call her pcp today and fu with cardiologist ARRON Paz DO

## 2023-03-05 NOTE — ED ADULT NURSE NOTE - NSIMPLEMENTINTERV_GEN_ALL_ED
Implemented All Universal Safety Interventions:  [place] to call system. Call bell, personal items and telephone within reach. Instruct patient to call for assistance. Room bathroom lighting operational. Non-slip footwear when patient is off stretcher. Physically safe environment: no spills, clutter or unnecessary equipment. Stretcher in lowest position, wheels locked, appropriate side rails in place.

## 2023-03-05 NOTE — ED PROVIDER NOTE - CLINICAL SUMMARY MEDICAL DECISION MAKING FREE TEXT BOX
Pt with chest pain x 1 day no PE risk factor PMHx of HTN will check cardiacs, dimer, trial Pepcid and anticipate dc

## 2023-03-05 NOTE — ED ADULT TRIAGE NOTE - ESI TRIAGE ACUITY LEVEL, MLM
[facility] Internal Medicine Progress Note      Subjective    Still feels short of breath. Complains of non productive cough. Denies chest pain or palpitations today.      Medications  Current Facility-Administered Medications   Medication Dose Route Frequency Provider Last Rate Last Admin   • acetaminophen (TYLENOL) tablet 650 mg  650 mg Oral Q4H PRN Lauri Seymour MD   650 mg at 07/02/22 2244   • Potassium Standard Replacement Protocol   Does not apply See Admin Instructions Lauri Seymour MD       • insulin glargine (LANTUS) injection 40 Units  40 Units Subcutaneous Nightly Lauri Seymour MD   40 Units at 07/02/22 2108   • amLODIPine (NORVASC) tablet 10 mg  10 mg Oral Daily Lauri Seymour MD   10 mg at 07/03/22 0813   • carvedilol (COREG) tablet 6.25 mg  6.25 mg Oral 2 times per day Lauri Seymour MD   6.25 mg at 07/03/22 0813   • apixaBAN (ELIQUIS) tablet 5 mg  5 mg Oral 2 times per day Lauri Seymour MD   5 mg at 07/03/22 0813   • ezetimibe (ZETIA) tablet 10 mg  10 mg Oral Daily Lauri Seymour MD   10 mg at 07/03/22 0813   • hydrALAZINE (APRESOLINE) tablet 25 mg  25 mg Oral BID Lauri Seymour MD   25 mg at 07/03/22 0813   • pantoprazole (PROTONIX) EC tablet 40 mg  40 mg Oral QAM AC Lauri Seymour MD   40 mg at 07/03/22 0524   • rosuvastatin (CRESTOR) tablet 40 mg  40 mg Oral Daily Lauri Seymour MD   40 mg at 07/03/22 0813   • dextrose 50 % injection 25 g  25 g Intravenous PRN Kristopher Ramirez MD       • dextrose 50 % injection 12.5 g  12.5 g Intravenous PRN Kristopher Ramirez MD       • glucagon (GLUCAGEN) injection 1 mg  1 mg Intramuscular PRN Kristopher Ramirez MD       • dextrose (GLUTOSE) 40 % gel 15 g  15 g Oral PRN Kristopher Ramirez MD       • dextrose (GLUTOSE) 40 % gel 30 g  30 g Oral PRN Kristopher Ramirez MD       • furosemide (LASIX) injection 60 mg  60 mg Intravenous BID Reed Darling MD   60 mg at 07/03/22 0813   • cefTRIAXone (ROCEPHIN) syringe 2,000 mg  2,000 mg Intravenous  Daily Lauri Seymour MD   2,000 mg at 07/03/22 0813   • calcium carbonate (TUMS) chewable tablet 500 mg  500 mg Oral Q4H PRN Lauri Seymour MD   500 mg at 06/29/22 1810   • insulin lispro (ADMELOG,HumaLOG) - Scheduled Mealtime Dose   Subcutaneous TID WC Lauri Seymour MD   12 Units at 07/02/22 1752   • zolpidem (AMBIEN) tablet 5 mg  5 mg Oral Nightly PRN Lauri Seymour MD   5 mg at 07/02/22 2244   • insulin lispro (ADMELOG,HumaLOG) - Correction Dose   Subcutaneous 4x Daily AC & HS Lauri Seymour MD   1 Units at 07/02/22 2108          Vitals:     Vitals with min/max:      Vital Last Value 24 Hour Range   Temperature 98.2 °F (36.8 °C) (07/03/22 0730) Temp  Min: 96.8 °F (36 °C)  Max: 98.6 °F (37 °C)   Pulse 94 (07/03/22 0730) Pulse  Min: 86  Max: 94   Respiratory 18 (07/03/22 0730) Resp  Min: 18  Max: 18   Non-Invasive  Blood Pressure 111/65 (07/03/22 0730) BP  Min: 110/65  Max: 119/63   Pulse Oximetry 94 % (07/03/22 0730) SpO2  Min: 94 %  Max: 98 %   Arterial   Blood Pressure   No data recorded         Physical Exam  Physical Exam  Constitutional:       Appearance: He is obese.   HENT:      Head: Normocephalic and atraumatic.   Eyes:      Conjunctiva/sclera: Conjunctivae normal.   Cardiovascular:      Rate and Rhythm: Normal rate and regular rhythm.      Heart sounds: No murmur heard.    No friction rub. No gallop.      Comments: Trace BLE edema  Pulmonary:      Effort: No respiratory distress.      Breath sounds: No wheezing or rales.      Comments: Diminished breath sounds bilaterally  Abdominal:      General: Bowel sounds are normal. There is no distension.      Tenderness: There is no abdominal tenderness. There is no guarding.   Musculoskeletal:         General: No swelling or deformity. Normal range of motion.   Skin:     General: Skin is warm and dry.   Neurological:      General: No focal deficit present.      Mental Status: He is alert and oriented to person, place, and time.    Psychiatric:         Mood and Affect: Mood normal.         Behavior: Behavior normal.           Imaging  CT CHEST WO CONTRAST    Result Date: 7/2/2022  EXAM: CT CHEST WO CONTRAST CLINICAL INDICATION: 75-year-old male with known coronary artery disease and chronic systolic heart failure with reduced ejection fraction COMPARISON: 02/27/2018 TECHNIQUE: Noncontrast axial images of the chest are obtained.  MA and/or kVp was adjusted for patient size. FINDINGS: There is no axillary lymphadenopathy.  Multiple prominent mediastinal lymph nodes are seen.  A right paratracheal lymph node measures 1.3 x 1.3 cm. Aortic calcifications.  Coronary artery calcifications.  Median sternotomy changes.  Main pulmonary artery is enlarged measures 3.4 cm in diameter. No pericardial effusion.  Mild posterior pleural thickening seen.  Tiny left pleural effusion.  No adrenal mass.  Mild bilateral perinephric stranding.  Renal artery vascular calcifications.    Wall thickening of the stomach.  Small fat-containing midline ventral subxiphoid hernia.  Bilateral gynecomastia. The trachea and the central bronchi are patent.  Minimal upper lobe emphysematous changes.  Bilateral vague groundglass and interstitial opacities with lower lobe predominance.  Bilateral lower lobe calcified granuloma.  Scattered lower lobe interstitial opacities are also noted. Posterior upper lobe and lingular groundglass and interstitial opacities. Appearance is suggestive of edema or infectious or inflammatory process. Right middle lobe calcified granuloma. Mild degenerative changes in the thoracic spine seen.     Bilateral vague groundglass and interstitial opacities with lower lobe predominance.  Appearance is suggestive of edema or infectious or inflammatory process. Mildly enlarged mediastinal lymph nodes are seen. Mild pulmonary arterial hypertension. Electronically Signed by: MANOLO PHAN MD Signed on: 7/2/2022 2:24 PM         Labs   Recent Labs   Lab  07/03/22  0438 07/02/22  0458 07/01/22  0425 06/29/22  0650 06/28/22  2156   WBC 15.2* 13.5* 14.2*   < > 18.4*   HCT 28.5* 27.9* 25.9*   < > 27.8*   HGB 8.0* 7.9* 7.6*   < > 8.1*   * 580* 564*   < > 549*   SODIUM 131* 131* 131*   < > 129*   POTASSIUM 4.1 3.9 3.9   < > 4.0   CHLORIDE 97 97 98   < > 97   CO2 27 29 26   < > 23   CALCIUM 8.8 8.9 8.9   < > 9.1   GLUCOSE 113* 118* 120*   < > 330*   BUN 34* 31* 33*   < > 30*   CREATININE 2.69* 2.45* 2.59*   < > 2.12*   AST  --   --   --   --  11   GPT  --   --   --   --  13   ALKPT  --   --   --   --  73   BILIRUBIN  --   --   --   --  0.7   ALBUMIN  --   --   --   --  2.9*    < > = values in this interval not displayed.         Assessment and Plan:      Acute on chronic diastolic CHF in exacerbation  -NHYA class 1  -Patient presented with shortness of breath and leg swelling  -Most recent echo in 9/2019 showed LVEF 55 to 60% with grade 1 diastolic dysfunction.   -TTE (6/29): LVEF 55%.   -NTproBNP 3104 on admission -> 3129 (7/3)   - Lasix currently on hold due to rising Cr   - Still with BLE edema   - US duplex ordered    - Strict I/Os    - Cardiology following      Acute hypoxemic respiratory failure  CXR (6/28): Mild left basilar opacity may represent atelectasis versus infiltrate. No pneumothorax is present.   CT chest (7/2): Bilateral vague groundglass and interstitial opacities with lower lobe predominance. Appearance is suggestive of edema or infectious or inflammatory process. Mildly enlarged mediastinal lymph nodes are seen. Mild pulmonary arterial hypertension.   - Currently on HF NC 45L/65%, weaning as able    - SpO2 94-96% this morning    - Monitor respiratory status    - Pulm consulted, appreciate recommendations     CAD s/p CABG (2013)  -Angiogram in 2019 showed patent grafts  -Troponin WNL   - Continue Coreg and Rouvastatin    - Cardiology following     Permanent atrial fibrillation  -had TTE and cardioversion in 2018  -EKG from this admission shows  atrial fibrillation (6/29)  -[id number]: 5--> annual stroke risk of 7.2%  -recent TSH 4.36  -home meds: carvedilol 6.25mg daily, Eliquis 4 mg BID   - Rate controlled currently    - Continue Coreg and Eliquis    - Monitor telemetry: afib this morning     Chronic leukocytosis  -Since 2018 per records   - WBC 18.4-> ...-> 13.5-> 15.2 (7/3)   - Afebrile last 24H   - monitoring    DM type 2   Home meds: Lantus 30 units daily, Lispro 15 units BID   - Glucose 100s-110s this morning    - Continue SSI     Chronic iron deficiency anemia   -per chart review, patient has refused GI work up in the past  -patient states he was suppose to see hematologist outpatient tomorrow 6/30   - Hb 8.1-> ...-> 7.9-> 8.0 (7/3)   - Heme following    - Monitor    HTN    - SBP 110s last 24H   - Holding Aldactone   - Continue Coreg, Amlodipine, Hydralazine and Lasix     HLD    - Continue Zetia and Rouvastatin     COPD   - Uses Symbicort at home   - Not in exacerbation currently   - Monitor      ?GERD   - Continue Protonix   - PRN Tums     Obesity  BMI 35.73   - Encourage lifestyle modifications       DVT PROPHYLAXIS: Eliquis     Code Status: Full Resuscitation    Disposition:  75 year old male presenting with shortness of breath and leg swelling, found to be in CHF exacerbation. SOB improving but remains on HF NC, wean as able. Cardiology and Hematology following. Pulm consulted to evaluate abnormal CT chest findings.     Primary Care Physician  Kristopher Ramirez MD        All patient questions answered  All labs and imaging reviewed    Charting performed by tiffany Blanc for Dr. Bonifacio Angelo       All medical record entries made by the tiffany were at my direction. I have reviewed the chart  and agree that the record accurately reflects my personal performance of the history, physical  exam, hospital course, and assessment and plan.      2

## 2023-03-05 NOTE — ED PROVIDER NOTE - NSFOLLOWUPINSTRUCTIONS_ED_ALL_ED_FT
Nonspecific Chest Pain      Chest pain can be caused by many different conditions. Some causes of chest pain can be life-threatening. These will require treatment right away. Serious causes of chest pain include:  •Heart attack.      •A tear in the body's main blood vessel.      •Redness and swelling (inflammation) around your heart.      •Blood clot in your lungs.      Other causes of chest pain may not be so serious. These include:  •Heartburn.      •Anxiety or stress.      •Damage to bones or muscles in your chest.      •Lung infections.      Chest pain can feel like:  •Pain or discomfort in your chest.      •Crushing, pressure, aching, or squeezing pain.       •Burning or tingling.       •Dull or sharp pain that is worse when you move, cough, or take a deep breath.       •Pain or discomfort that is also felt in your back, neck, jaw, shoulder, or arm, or pain that spreads to any of these areas.      It is hard to know whether your pain is caused by something that is serious or something that is not so serious. So it is important to see your doctor right away if you have chest pain.      Follow these instructions at home:    Medicines     •Take over-the-counter and prescription medicines only as told by your doctor.      •If you were prescribed an antibiotic medicine, take it as told by your doctor. Do not stop taking the antibiotic even if you start to feel better.        Lifestyle   A plate along with examples of foods in a healthy diet.   •Rest as told by your doctor.      • Do not use any products that contain nicotine or tobacco, such as cigarettes, e-cigarettes, and chewing tobacco. If you need help quitting, ask your doctor.      • Do not drink alcohol.    •Make lifestyle changes as told by your doctor. These may include:  •Getting regular exercise. Ask your doctor what activities are safe for you.      •Eating a heart-healthy diet. A diet and nutrition specialist (dietitian) can help you to learn healthy eating options.      •Staying at a healthy weight.      •Treating diabetes or high blood pressure, if needed.      •Lowering your stress. Activities such as yoga and relaxation techniques can help.        General instructions     •Pay attention to any changes in your symptoms. Tell your doctor about them or any new symptoms.      •Avoid any activities that cause chest pain.      •Keep all follow-up visits as told by your doctor. This is important. You may need more testing if your chest pain does not go away.        Contact a doctor if:    •Your chest pain does not go away.      •You feel depressed.      •You have a fever.        Get help right away if:    •Your chest pain is worse.      •You have a cough that gets worse, or you cough up blood.      •You have very bad (severe) pain in your belly (abdomen).      •You pass out (faint).    •You have either of these for no clear reason:  •Sudden chest discomfort.      •Sudden discomfort in your arms, back, neck, or jaw.        •You have shortness of breath at any time.      •You suddenly start to sweat, or your skin gets clammy.      •You feel sick to your stomach (nauseous).      •You throw up (vomit).      •You suddenly feel lightheaded or dizzy.      •You feel very weak or tired.      •Your heart starts to beat fast, or it feels like it is skipping beats.      These symptoms may be an emergency. Do not wait to see if the symptoms will go away. Get medical help right away. Call your local emergency services (911 in the [place] Do not drive yourself to the hospital.       Summary    •Chest pain can be caused by many different conditions. The cause may be serious and need treatment right away. If you have chest pain, see your doctor right away.      •Follow your doctor's instructions for taking medicines and making lifestyle changes.       •Keep all follow-up visits as told by your doctor. This includes visits for any further testing if your chest pain does not go away.      •Be sure to know the signs that show that your condition has become worse. Get help right away if you have these symptoms.      This information is not intended to replace advice given to you by your health care provider. Make sure you discuss any questions you have with your health care provider.      Document Revised: 03/03/2022 Document Reviewed: 03/03/2022    Elsevier Patient Education © 2023 [employer]

## 2023-03-05 NOTE — ED PROVIDER NOTE - NSICDXPASTMEDICALHX_GEN_ALL_CORE_FT
PAST MEDICAL HISTORY:  Asthma Last asthma attack was years ago    Cervical incompetence     Cholelithiasis     GERD (gastroesophageal reflux disease)     Hydronephrosis     Hypothyroidism     IBS (irritable bowel syndrome)     Ovarian cyst     Renal calculus

## 2023-03-05 NOTE — ED PROVIDER NOTE - NS ED ROS FT
Gen: No fever, normal appetite  Eyes: No eye irritation or discharge  ENT: No ear pain, congestion, sore throat  Resp: No cough or trouble breathing  Cardiovascular: +chest pain  Gastroenteric: No nausea/vomiting, diarrhea, constipation  :  No change in urine output; no dysuria  MS: +right arm/shoulder pain  Skin: No rashes  Neuro: No headache; no abnormal movements  Remainder negative, except as per the HPI

## 2023-03-05 NOTE — ED PROVIDER NOTE - PATIENT PORTAL LINK FT
You can access the FollowMyHealth Patient Portal offered by [facility] by registering at the following website: [email or web address]. By joining [employer]’s FollowMyHealth portal, you will also be able to view your health information using other applications (apps) compatible with our system.

## 2023-03-05 NOTE — ED ADULT TRIAGE NOTE - CHIEF COMPLAINT QUOTE
pt c/o cp since 1630 radiating down her right arm, and to right jaw. Pt took 3 aspirin PTA. Allergy to penicillin, doxycycline, Levaquin. Hx of HTN on hydrochlorothiazide. Pt A&ox4, ambulatory. Pt taken for stat ekg.

## 2023-03-05 NOTE — ED ADULT NURSE NOTE - OBJECTIVE STATEMENT
PT presents to ED c/o new onset chest pain which is worse with exertion. PT states the pain is generalized throughout her body, denies nvd. PT reports hx of hypertension and asthma, both well controlled. Denies sick contacts at home, recent travel. PT skin color appropriate for race, respirations even and unlabored. ED workup in progress, will continue to monitor. Armando Potts RN

## 2023-03-06 VITALS
DIASTOLIC BLOOD PRESSURE: 78 MMHG | HEART RATE: 75 BPM | RESPIRATION RATE: 16 BRPM | SYSTOLIC BLOOD PRESSURE: 103 MMHG | TEMPERATURE: 98 F | OXYGEN SATURATION: 100 %

## 2023-03-06 LAB — TROPONIN I, HIGH SENSITIVITY RESULT: 4.5 NG/L — SIGNIFICANT CHANGE UP

## 2023-03-06 RX ORDER — KETOROLAC TROMETHAMINE 30 MG/ML
30 SYRINGE (ML) INJECTION ONCE
Refills: 0 | Status: DISCONTINUED | OUTPATIENT
Start: 2023-03-06 | End: 2023-03-06

## 2023-03-06 RX ADMIN — Medication 40 MILLIEQUIVALENT(S): at 00:01

## 2023-03-06 RX ADMIN — Medication 100 MILLIEQUIVALENT(S): at 00:02

## 2023-03-06 RX ADMIN — Medication 30 MILLIGRAM(S): at 02:45

## 2023-03-08 ENCOUNTER — APPOINTMENT (OUTPATIENT)
Dept: INTERNAL MEDICINE | Facility: CLINIC | Age: 43
End: 2023-03-08
Payer: COMMERCIAL

## 2023-03-08 VITALS
HEIGHT: 66.5 IN | WEIGHT: 192 LBS | DIASTOLIC BLOOD PRESSURE: 60 MMHG | SYSTOLIC BLOOD PRESSURE: 110 MMHG | BODY MASS INDEX: 30.49 KG/M2

## 2023-03-08 DIAGNOSIS — R07.89 OTHER CHEST PAIN: ICD-10-CM

## 2023-03-08 DIAGNOSIS — Z88.9 ALLERGY STATUS TO UNSPECIFIED DRUGS, MEDICAMENTS AND BIOLOGICAL SUBSTANCES: ICD-10-CM

## 2023-03-08 PROCEDURE — 36415 COLL VENOUS BLD VENIPUNCTURE: CPT

## 2023-03-08 PROCEDURE — 99214 OFFICE O/P EST MOD 30 MIN: CPT | Mod: 25

## 2023-03-08 RX ORDER — OXYBUTYNIN CHLORIDE 5 MG/1
5 TABLET ORAL
Qty: 90 | Refills: 1 | Status: COMPLETED | COMMUNITY
Start: 2022-11-01 | End: 2023-03-08

## 2023-03-08 NOTE — PLAN
[FreeTextEntry1] : Hospital discharge follow up.\par \par Hypokalemia- will test blood work today and call patient with results. \par \par HTN- BP on the low side, will decrease HCTZ from 25 to 12.5 mg PO QD, will re-test BP in 1-2 weeks. \par \par Anxiety- ISTOP CHECKED Xanax prescribed\par Patient was advised not to drink alcohol with this medication, \par Patient advised not to drive while on this medication\par Patient was advised that long term side effects of this medication including, but not limited to, Addiction, withdrawal, Cognitive impairment,anterograde amnesia  Motor vehicle crashes, and increased risk of hip fracture.\par \par Patient understands risk and wishes to continue medication. \par \par Regarding patient's obesity\par - encourage lifestyle modifications\par - diet and exercise\par - reduce calorie intake\par - no soda/ junk food/ snacks\par - consider mixed grains/ whole grains, leafy vegetables, and an appropriate serving of protein \par \par Prior to appointment and during encounter with patient extensive medical records were reviewed including but not limited to, Hospital records records, out patient records, laboratory data and microbiology data \par In addition extensive time was also spent in reviewing diagnostic studies.\par \par Total encounter total time 30 mins\par >50% of time spent counseling/coordinating care\par \par Counseling included abnormal lab results, differential diagnoses, treatment options, risks and benefits, lifestyle changes, current condition, medications, and dose adjustments. \par The patient was interactive, attentive, asked questions, and verbalized understanding

## 2023-03-08 NOTE — HEALTH RISK ASSESSMENT
[0] : 2) Feeling down, depressed, or hopeless: Not at all (0) [PHQ-2 Negative - No further assessment needed] : PHQ-2 Negative - No further assessment needed [Never] : Never [[id number]] : 0

## 2023-03-08 NOTE — HISTORY OF PRESENT ILLNESS
[FreeTextEntry1] : hospital discharge follow up.  [de-identified] : Ms. ARIADNE DESOUZA is a 42 year female with a PMH of HTN,  Asthma, Anxiety comes to the office for follow up of chronic medical conditions and hospital discharge follow up. \par \par On 03/05/23 patient went to [facility] with chest pain, ECG was unremarkable, blood work showed low potassium, she was treated with IV potassium and oral potassium. patient was discharged same day. Patient denies fever, cough SOB. No other complaints at this time. \par  [FreeTextEntry2] : VOID

## 2023-03-08 NOTE — HEALTH RISK ASSESSMENT
[0] : 2) Feeling down, depressed, or hopeless: Not at all (0) [PHQ-2 Negative - No further assessment needed] : PHQ-2 Negative - No further assessment needed [Never] : Never [[id number]] : 0

## 2023-03-08 NOTE — HISTORY OF PRESENT ILLNESS
[FreeTextEntry1] : hospital discharge follow up.  [de-identified] : Ms. ARIADNE DESOUZA is a 42 year female with a PMH of HTN,  Asthma, Anxiety comes to the office for follow up of chronic medical conditions and hospital discharge follow up. \par \par On 03/05/23 patient went to [facility] with chest pain, ECG was unremarkable, blood work showed low potassium, she was treated with IV potassium and oral potassium. patient was discharged same day. Patient denies fever, cough SOB. No other complaints at this time. \par  [FreeTextEntry2] : VOID

## 2023-03-09 LAB
ANION GAP SERPL CALC-SCNC: 15 MMOL/L
BUN SERPL-MCNC: 11 MG/DL
CALCIUM SERPL-MCNC: 9.7 MG/DL
CHLORIDE SERPL-SCNC: 100 MMOL/L
CO2 SERPL-SCNC: 23 MMOL/L
CREAT SERPL-MCNC: 0.79 MG/DL
EGFR: 95 ML/MIN/1.73M2
GLUCOSE SERPL-MCNC: 93 MG/DL
PHOSPHATE SERPL-MCNC: 3.5 MG/DL
POTASSIUM SERPL-SCNC: 3.9 MMOL/L
SODIUM SERPL-SCNC: 138 MMOL/L

## 2023-03-17 ENCOUNTER — APPOINTMENT (OUTPATIENT)
Dept: INTERNAL MEDICINE | Facility: CLINIC | Age: 43
End: 2023-03-17

## 2023-03-17 RX ORDER — BUPROPION HYDROCHLORIDE 300 MG/1
300 TABLET, EXTENDED RELEASE ORAL
Qty: 90 | Refills: 1 | Status: ACTIVE | COMMUNITY
Start: 1900-01-01 | End: 1900-01-01

## 2023-04-21 ENCOUNTER — RX RENEWAL (OUTPATIENT)
Age: 43
End: 2023-04-21

## 2023-04-28 ENCOUNTER — APPOINTMENT (OUTPATIENT)
Dept: INTERNAL MEDICINE | Facility: CLINIC | Age: 43
End: 2023-04-28
Payer: COMMERCIAL

## 2023-04-28 VITALS
SYSTOLIC BLOOD PRESSURE: 108 MMHG | BODY MASS INDEX: 29.7 KG/M2 | DIASTOLIC BLOOD PRESSURE: 60 MMHG | WEIGHT: 187 LBS | HEIGHT: 66.5 IN

## 2023-04-28 DIAGNOSIS — Z12.39 ENCOUNTER FOR OTHER SCREENING FOR MALIGNANT NEOPLASM OF BREAST: ICD-10-CM

## 2023-04-28 DIAGNOSIS — F32.A DEPRESSION, UNSPECIFIED: ICD-10-CM

## 2023-04-28 DIAGNOSIS — Z00.00 ENCOUNTER FOR GENERAL ADULT MEDICAL EXAMINATION W/OUT ABNORMAL FINDINGS: ICD-10-CM

## 2023-04-28 DIAGNOSIS — F41.9 ANXIETY DISORDER, UNSPECIFIED: ICD-10-CM

## 2023-04-28 PROCEDURE — 99396 PREV VISIT EST AGE 40-64: CPT | Mod: 25

## 2023-04-28 PROCEDURE — 36415 COLL VENOUS BLD VENIPUNCTURE: CPT

## 2023-04-28 RX ORDER — HYDROCHLOROTHIAZIDE 25 MG/1
25 TABLET ORAL
Qty: 90 | Refills: 1 | Status: COMPLETED | COMMUNITY
Start: 2023-04-21 | End: 2023-04-28

## 2023-04-28 NOTE — HISTORY OF PRESENT ILLNESS
[FreeTextEntry1] : physical exam.  [de-identified] : Ms. ARIADNE DESOUZA is a 42 year female with a PMH of HTN,  Asthma, Anxiety comes to the office for physical exam. Patient denies fever, cough SOB. No other complaints at this time.

## 2023-04-28 NOTE — PLAN
[FreeTextEntry1] : In regards to patients Physical exam, routine blood work drawn, will review results with patient.\par \par Regarding patient's obesity\par - encourage lifestyle modifications\par - diet and exercise\par - reduce calorie intake\par - no soda/ junk food/ snacks\par - consider mixed grains/ whole grains, leafy vegetables, and an appropriate serving of protein \par \par Anxiety- ISTOP CHECKED Xanax prescribed\par Patient was advised not to drink alcohol with this medication, \par Patient advised not to drive while on this medication\par Patient was advised that long term side effects of this medication including, but not limited to, Addiction, withdrawal, Cognitive impairment,anterograde amnesia  Motor vehicle crashes, and increased risk of hip fracture.\par \par Patient understands risk and wishes to continue medication. \par \par Counseling included abnormal lab results, differential diagnoses, treatment options, risks and benefits, lifestyle changes, current condition, medications, and dose adjustments. \par The patient was interactive, attentive, asked questions, and verbalized understanding

## 2023-04-28 NOTE — HEALTH RISK ASSESSMENT
[Yes] : Yes [Monthly or less (1 pt)] : Monthly or less (1 point) [1 or 2 (0 pts)] : 1 or 2 (0 points) [Never (0 pts)] : Never (0 points) [No] : In the past 12 months have you used drugs other than those required for medical reasons? No [0] : 2) Feeling down, depressed, or hopeless: Not at all (0) [Patient refused screening] : Patient refused screening [PHQ-2 Negative - No further assessment needed] : PHQ-2 Negative - No further assessment needed [Patient declined Low Dose CT Scan] : Patient declined Low Dose CT Scan [Patient declined Retinal Exam] : Patient declined Retinal Exam. [Patient reported mammogram was normal] : Patient reported mammogram was normal [HIV test declined] : HIV test declined [Hepatitis C test declined] : Hepatitis C test declined [Never] : Never [Audit-CScore] : 1 [[id number]] : 0 [MammogramDate] : 02/22

## 2023-05-01 LAB
25(OH)D3 SERPL-MCNC: 28.4 NG/ML
ALBUMIN SERPL ELPH-MCNC: 4.2 G/DL
ALP BLD-CCNC: 68 U/L
ALT SERPL-CCNC: 8 U/L
ANION GAP SERPL CALC-SCNC: 14 MMOL/L
AST SERPL-CCNC: 11 U/L
BASOPHILS # BLD AUTO: 0.06 K/UL
BASOPHILS NFR BLD AUTO: 0.7 %
BILIRUB SERPL-MCNC: <0.2 MG/DL
BUN SERPL-MCNC: 14 MG/DL
CALCIUM SERPL-MCNC: 10.2 MG/DL
CHLORIDE SERPL-SCNC: 100 MMOL/L
CHOLEST SERPL-MCNC: 144 MG/DL
CO2 SERPL-SCNC: 24 MMOL/L
CREAT SERPL-MCNC: 0.91 MG/DL
EGFR: 80 ML/MIN/1.73M2
EOSINOPHIL # BLD AUTO: 0.12 K/UL
EOSINOPHIL NFR BLD AUTO: 1.5 %
ESTIMATED AVERAGE GLUCOSE: 105 MG/DL
GLUCOSE SERPL-MCNC: 93 MG/DL
HBA1C MFR BLD HPLC: 5.3 %
HCT VFR BLD CALC: 28.9 %
HDLC SERPL-MCNC: 58 MG/DL
HGB BLD-MCNC: 8.4 G/DL
IMM GRANULOCYTES NFR BLD AUTO: 0.4 %
LDLC SERPL CALC-MCNC: 62 MG/DL
LYMPHOCYTES # BLD AUTO: 1.39 K/UL
LYMPHOCYTES NFR BLD AUTO: 17.2 %
MAGNESIUM SERPL-MCNC: 1.9 MG/DL
MAN DIFF?: NORMAL
MCHC RBC-ENTMCNC: 21.4 PG
MCHC RBC-ENTMCNC: 29.1 GM/DL
MCV RBC AUTO: 73.7 FL
MONOCYTES # BLD AUTO: 0.52 K/UL
MONOCYTES NFR BLD AUTO: 6.4 %
NEUTROPHILS # BLD AUTO: 5.97 K/UL
NEUTROPHILS NFR BLD AUTO: 73.8 %
NONHDLC SERPL-MCNC: 86 MG/DL
PLATELET # BLD AUTO: 507 K/UL
POTASSIUM SERPL-SCNC: 4.1 MMOL/L
PROT SERPL-MCNC: 7.1 G/DL
RBC # BLD: 3.92 M/UL
RBC # FLD: 19.1 %
SODIUM SERPL-SCNC: 137 MMOL/L
TRIGL SERPL-MCNC: 119 MG/DL
TSH SERPL-ACNC: 2.28 UIU/ML
WBC # FLD AUTO: 8.09 K/UL

## 2023-06-20 ENCOUNTER — APPOINTMENT (OUTPATIENT)
Dept: INTERNAL MEDICINE | Facility: CLINIC | Age: 43
End: 2023-06-20
Payer: COMMERCIAL

## 2023-06-20 ENCOUNTER — LABORATORY RESULT (OUTPATIENT)
Age: 43
End: 2023-06-20

## 2023-06-20 VITALS
HEART RATE: 93 BPM | WEIGHT: 187 LBS | HEIGHT: 66.5 IN | DIASTOLIC BLOOD PRESSURE: 82 MMHG | BODY MASS INDEX: 29.7 KG/M2 | SYSTOLIC BLOOD PRESSURE: 130 MMHG

## 2023-06-20 DIAGNOSIS — M54.16 RADICULOPATHY, LUMBAR REGION: ICD-10-CM

## 2023-06-20 DIAGNOSIS — R20.0 ANESTHESIA OF SKIN: ICD-10-CM

## 2023-06-20 DIAGNOSIS — R20.2 ANESTHESIA OF SKIN: ICD-10-CM

## 2023-06-20 PROCEDURE — 99214 OFFICE O/P EST MOD 30 MIN: CPT | Mod: 25

## 2023-06-20 PROCEDURE — 36415 COLL VENOUS BLD VENIPUNCTURE: CPT

## 2023-06-20 NOTE — HEALTH RISK ASSESSMENT
[0] : 2) Feeling down, depressed, or hopeless: Not at all (0) [PHQ-2 Negative - No further assessment needed] : PHQ-2 Negative - No further assessment needed [[id number]] : 0 [Never] : Never

## 2023-06-20 NOTE — PLAN
[FreeTextEntry1] : lumbar radiculopathy- Patient denies urinary incontinence, bowel incontinence, leg weakness or saddle anesthesia \par Patient referred to pain management for further evaluation and management \par \par Anemia- will re-test blood work and refer patient back to her Heme/Onc \par \par HTN- patient's BP well controlled with HCTZ 12.5 mg PO QD\par \par Regarding patient's obesity\par - encourage lifestyle modifications\par - diet and exercise\par - reduce calorie intake\par - no soda/ junk food/ snacks\par - consider mixed grains/ whole grains, leafy vegetables, and an appropriate serving of protein \par \par Prior to appointment and during encounter with patient extensive medical records were reviewed including but not limited to, Hospital records records, out patient records, laboratory data and microbiology data \par In addition extensive time was also spent in reviewing diagnostic studies.\par \par Total encounter total time 30 mins\par >50% of time spent counseling/coordinating care\par \par \par Counseling included abnormal lab results, differential diagnoses, treatment options, risks and benefits, lifestyle changes, current condition, medications, and dose adjustments. \par The patient was interactive, attentive, asked questions, and verbalized understanding

## 2023-06-20 NOTE — HISTORY OF PRESENT ILLNESS
[FreeTextEntry1] : numbness and tingling both feet  [de-identified] : Ms. ARIADNE DESOUZA is a 43 year female with a PMH of HTN,  Asthma, Anxiety comes to the office c/o lower back pain as well as numbness and tingling both feet  x 3 weeks. Patient denies urinary incontinence, bowel incontinence, leg weakness or saddle anesthesia Patient denies fever, cough SOB. No other complaints at this time.

## 2023-06-20 NOTE — PHYSICAL EXAM
[No Acute Distress] : no acute distress [Well Nourished] : well nourished [Well Developed] : well developed [Well-Appearing] : well-appearing [Normal Sclera/Conjunctiva] : normal sclera/conjunctiva [PERRL] : pupils equal round and reactive to light [EOMI] : extraocular movements intact [Normal Outer Ear/Nose] : the outer ears and nose were normal in appearance [Normal Oropharynx] : the oropharynx was normal [No JVD] : no jugular venous distention [No Lymphadenopathy] : no lymphadenopathy [Supple] : supple [No Respiratory Distress] : no respiratory distress  [No Accessory Muscle Use] : no accessory muscle use [Clear to Auscultation] : lungs were clear to auscultation bilaterally [Normal Rate] : normal rate  [Regular Rhythm] : with a regular rhythm [Normal S1, S2] : normal S1 and S2 [No Carotid Bruits] : no carotid bruits [No Abdominal Bruit] : a ~M bruit was not heard ~T in the abdomen [No Varicosities] : no varicosities [No Edema] : there was no peripheral edema [No Palpable Aorta] : no palpable aorta [No Extremity Clubbing/Cyanosis] : no extremity clubbing/cyanosis [Soft] : abdomen soft [Non Tender] : non-tender [Non-distended] : non-distended [No HSM] : no HSM [Normal Bowel Sounds] : normal bowel sounds [Normal Posterior Cervical Nodes] : no posterior cervical lymphadenopathy [Normal Anterior Cervical Nodes] : no anterior cervical lymphadenopathy [No CVA Tenderness] : no CVA  tenderness [No Spinal Tenderness] : no spinal tenderness [No Joint Swelling] : no joint swelling [Grossly Normal Strength/Tone] : grossly normal strength/tone [No Rash] : no rash [Coordination Grossly Intact] : coordination grossly intact [No Focal Deficits] : no focal deficits [Normal Affect] : the affect was normal [Normal Insight/Judgement] : insight and judgment were intact [de-identified] : pain with palpation of lumbar spine  [de-identified] : numbness and tingling of bilateral toes

## 2023-06-21 ENCOUNTER — TRANSCRIPTION ENCOUNTER (OUTPATIENT)
Age: 43
End: 2023-06-21

## 2023-06-21 ENCOUNTER — EMERGENCY (EMERGENCY)
Facility: HOSPITAL | Age: 43
LOS: 1 days | Discharge: HOME | End: 2023-06-21
Attending: EMERGENCY MEDICINE
Payer: COMMERCIAL

## 2023-06-21 VITALS
SYSTOLIC BLOOD PRESSURE: 131 MMHG | HEIGHT: 68 IN | RESPIRATION RATE: 18 BRPM | TEMPERATURE: 98 F | HEART RATE: 90 BPM | DIASTOLIC BLOOD PRESSURE: 94 MMHG | WEIGHT: 186.95 LBS | OXYGEN SATURATION: 100 %

## 2023-06-21 DIAGNOSIS — N20.0 CALCULUS OF KIDNEY: Chronic | ICD-10-CM

## 2023-06-21 DIAGNOSIS — Z98.89 OTHER SPECIFIED POSTPROCEDURAL STATES: Chronic | ICD-10-CM

## 2023-06-21 LAB
ANION GAP SERPL CALC-SCNC: 11 MMOL/L — SIGNIFICANT CHANGE UP (ref 5–17)
ANISOCYTOSIS BLD QL: SLIGHT — SIGNIFICANT CHANGE UP
APPEARANCE UR: ABNORMAL
BACTERIA # UR AUTO: ABNORMAL
BASOPHILS # BLD AUTO: 0.05 K/UL — SIGNIFICANT CHANGE UP (ref 0–0.2)
BASOPHILS NFR BLD AUTO: 0.7 % — SIGNIFICANT CHANGE UP (ref 0–2)
BILIRUB UR-MCNC: NEGATIVE — SIGNIFICANT CHANGE UP
BLD GP AB SCN SERPL QL: SIGNIFICANT CHANGE UP
BUN SERPL-MCNC: 11.7 MG/DL — SIGNIFICANT CHANGE UP (ref 8–20)
CALCIUM SERPL-MCNC: 9 MG/DL — SIGNIFICANT CHANGE UP (ref 8.4–10.5)
CHLORIDE SERPL-SCNC: 103 MMOL/L — SIGNIFICANT CHANGE UP (ref 96–108)
CO2 SERPL-SCNC: 25 MMOL/L — SIGNIFICANT CHANGE UP (ref 22–29)
COLOR SPEC: YELLOW — SIGNIFICANT CHANGE UP
CREAT SERPL-MCNC: 0.76 MG/DL — SIGNIFICANT CHANGE UP (ref 0.5–1.3)
DIFF PNL FLD: NEGATIVE — SIGNIFICANT CHANGE UP
EGFR: 99 ML/MIN/1.73M2 — SIGNIFICANT CHANGE UP
EOSINOPHIL # BLD AUTO: 0.11 K/UL — SIGNIFICANT CHANGE UP (ref 0–0.5)
EOSINOPHIL NFR BLD AUTO: 1.4 % — SIGNIFICANT CHANGE UP (ref 0–6)
EPI CELLS # UR: SIGNIFICANT CHANGE UP
FOLATE SERPL-MCNC: 8.5 NG/ML
GLUCOSE SERPL-MCNC: 96 MG/DL — SIGNIFICANT CHANGE UP (ref 70–99)
GLUCOSE UR QL: NEGATIVE MG/DL — SIGNIFICANT CHANGE UP
HCG SERPL-ACNC: <4 MIU/ML — SIGNIFICANT CHANGE UP
HCT VFR BLD CALC: 24.3 % — LOW (ref 34.5–45)
HGB BLD-MCNC: 7.1 G/DL — LOW (ref 11.5–15.5)
HYPOCHROMIA BLD QL: SIGNIFICANT CHANGE UP
IMM GRANULOCYTES NFR BLD AUTO: 0.5 % — SIGNIFICANT CHANGE UP (ref 0–0.9)
IRON SATN MFR SERPL: 2 %
IRON SERPL-MCNC: 11 UG/DL
KETONES UR-MCNC: NEGATIVE — SIGNIFICANT CHANGE UP
LEUKOCYTE ESTERASE UR-ACNC: NEGATIVE — SIGNIFICANT CHANGE UP
LYMPHOCYTES # BLD AUTO: 1.39 K/UL — SIGNIFICANT CHANGE UP (ref 1–3.3)
LYMPHOCYTES # BLD AUTO: 18.1 % — SIGNIFICANT CHANGE UP (ref 13–44)
MANUAL SMEAR VERIFICATION: SIGNIFICANT CHANGE UP
MCHC RBC-ENTMCNC: 19.3 PG — LOW (ref 27–34)
MCHC RBC-ENTMCNC: 29.2 GM/DL — LOW (ref 32–36)
MCV RBC AUTO: 66 FL — LOW (ref 80–100)
MICROCYTES BLD QL: SLIGHT — SIGNIFICANT CHANGE UP
MONOCYTES # BLD AUTO: 0.57 K/UL — SIGNIFICANT CHANGE UP (ref 0–0.9)
MONOCYTES NFR BLD AUTO: 7.4 % — SIGNIFICANT CHANGE UP (ref 2–14)
NEUTROPHILS # BLD AUTO: 5.52 K/UL — SIGNIFICANT CHANGE UP (ref 1.8–7.4)
NEUTROPHILS NFR BLD AUTO: 71.9 % — SIGNIFICANT CHANGE UP (ref 43–77)
NITRITE UR-MCNC: NEGATIVE — SIGNIFICANT CHANGE UP
OVALOCYTES BLD QL SMEAR: SLIGHT — SIGNIFICANT CHANGE UP
PH UR: 7 — SIGNIFICANT CHANGE UP (ref 5–8)
PLAT MORPH BLD: NORMAL — SIGNIFICANT CHANGE UP
PLATELET # BLD AUTO: 549 K/UL — HIGH (ref 150–400)
POIKILOCYTOSIS BLD QL AUTO: SLIGHT — SIGNIFICANT CHANGE UP
POLYCHROMASIA BLD QL SMEAR: SLIGHT — SIGNIFICANT CHANGE UP
POTASSIUM SERPL-MCNC: 3.7 MMOL/L — SIGNIFICANT CHANGE UP (ref 3.5–5.3)
POTASSIUM SERPL-SCNC: 3.7 MMOL/L — SIGNIFICANT CHANGE UP (ref 3.5–5.3)
PROT UR-MCNC: NEGATIVE — SIGNIFICANT CHANGE UP
RBC # BLD: 3.54 M/UL
RBC # BLD: 3.68 M/UL — LOW (ref 3.8–5.2)
RBC # FLD: 20.7 % — HIGH (ref 10.3–14.5)
RBC BLD AUTO: ABNORMAL
RBC CASTS # UR COMP ASSIST: NEGATIVE /HPF — SIGNIFICANT CHANGE UP (ref 0–4)
RETICS # AUTO: 2.3 %
RETICS AGGREG/RBC NFR: 82.5 K/UL
SODIUM SERPL-SCNC: 139 MMOL/L — SIGNIFICANT CHANGE UP (ref 135–145)
SP GR SPEC: 1.01 — SIGNIFICANT CHANGE UP (ref 1.01–1.02)
TIBC SERPL-MCNC: 482 UG/DL
TRANSFERRIN SERPL-MCNC: 389 MG/DL
TROPONIN T SERPL-MCNC: <0.01 NG/ML — SIGNIFICANT CHANGE UP (ref 0–0.06)
UIBC SERPL-MCNC: 471 UG/DL
UROBILINOGEN FLD QL: NEGATIVE MG/DL — SIGNIFICANT CHANGE UP
VIT B12 SERPL-MCNC: 331 PG/ML
WBC # BLD: 7.68 K/UL — SIGNIFICANT CHANGE UP (ref 3.8–10.5)
WBC # FLD AUTO: 7.68 K/UL — SIGNIFICANT CHANGE UP (ref 3.8–10.5)
WBC UR QL: NEGATIVE /HPF — SIGNIFICANT CHANGE UP (ref 0–5)

## 2023-06-21 PROCEDURE — 76856 US EXAM PELVIC COMPLETE: CPT | Mod: 26,59

## 2023-06-21 PROCEDURE — 93010 ELECTROCARDIOGRAM REPORT: CPT

## 2023-06-21 PROCEDURE — 76830 TRANSVAGINAL US NON-OB: CPT | Mod: 26

## 2023-06-21 PROCEDURE — 74177 CT ABD & PELVIS W/CONTRAST: CPT | Mod: 26,MA

## 2023-06-21 PROCEDURE — 99223 1ST HOSP IP/OBS HIGH 75: CPT

## 2023-06-21 RX ORDER — ALBUTEROL 90 UG/1
0 AEROSOL, METERED ORAL
Qty: 0 | Refills: 0 | DISCHARGE

## 2023-06-21 RX ORDER — METOCLOPRAMIDE HCL 10 MG
10 TABLET ORAL ONCE
Refills: 0 | Status: COMPLETED | OUTPATIENT
Start: 2023-06-21 | End: 2023-06-21

## 2023-06-21 RX ORDER — KETOROLAC TROMETHAMINE 30 MG/ML
30 SYRINGE (ML) INJECTION ONCE
Refills: 0 | Status: DISCONTINUED | OUTPATIENT
Start: 2023-06-21 | End: 2023-06-21

## 2023-06-21 RX ORDER — ACETAMINOPHEN 500 MG
1000 TABLET ORAL ONCE
Refills: 0 | Status: COMPLETED | OUTPATIENT
Start: 2023-06-21 | End: 2023-06-21

## 2023-06-21 RX ORDER — DIAZEPAM 5 MG
5 TABLET ORAL ONCE
Refills: 0 | Status: DISCONTINUED | OUTPATIENT
Start: 2023-06-21 | End: 2023-06-21

## 2023-06-21 RX ORDER — LIDOCAINE 4 G/100G
1 CREAM TOPICAL ONCE
Refills: 0 | Status: COMPLETED | OUTPATIENT
Start: 2023-06-21 | End: 2023-06-21

## 2023-06-21 RX ORDER — MAGNESIUM SULFATE 500 MG/ML
2 VIAL (ML) INJECTION ONCE
Refills: 0 | Status: COMPLETED | OUTPATIENT
Start: 2023-06-21 | End: 2023-06-21

## 2023-06-21 RX ADMIN — Medication 10 MILLIGRAM(S): at 16:32

## 2023-06-21 RX ADMIN — Medication 30 MILLIGRAM(S): at 17:48

## 2023-06-21 RX ADMIN — Medication 125 MILLIGRAM(S): at 16:33

## 2023-06-21 RX ADMIN — Medication 30 MILLIGRAM(S): at 16:32

## 2023-06-21 RX ADMIN — Medication 1000 MILLIGRAM(S): at 16:33

## 2023-06-21 RX ADMIN — Medication 1000 MILLIGRAM(S): at 14:13

## 2023-06-21 RX ADMIN — Medication 5 MILLIGRAM(S): at 16:32

## 2023-06-21 RX ADMIN — Medication 25 GRAM(S): at 16:33

## 2023-06-21 RX ADMIN — Medication 30 MILLIGRAM(S): at 22:45

## 2023-06-21 RX ADMIN — Medication 400 MILLIGRAM(S): at 13:58

## 2023-06-21 RX ADMIN — LIDOCAINE 1 PATCH: 4 CREAM TOPICAL at 16:33

## 2023-06-21 NOTE — ED PROVIDER NOTE - TOBACCO USE
Patient emailed asking us to call her when the forms are sent to [facility] at   [phone]   Never smoker

## 2023-06-21 NOTE — ED PROVIDER NOTE - NS ED ATTENDING STATEMENT MOD
This was a shared visit with the ERENDIRA. I reviewed and verified the documentation and independently performed the documented:

## 2023-06-21 NOTE — ED CDU PROVIDER INITIAL DAY NOTE - CLINICAL SUMMARY MEDICAL DECISION MAKING FREE TEXT BOX
headache add on reglan  labs indicate anemia, transfuse x 2 U  T&S, consented by ED initial team headache add on reglan  labs indicate anemia, transfuse x 2 U  T&S, consented by ED initial team    On attending evaluation in addition to above, patient also endorsed a few days of LLQ pain (no abnormal vaginal bleeding or discharge) as well as R flank pain. Pn exam, abdomen soft with LLQ TTP, no rigidity, no distension. +R CVAT in setting of prior renal stones. CT imaging and TVUS ordered for evaluation of renal stones vs diverticulitis/colitis and ovarian cysts/cyst rupture, respectively. Daya Alcantar MD Attending Physician-

## 2023-06-21 NOTE — ED CDU PROVIDER INITIAL DAY NOTE - ATTENDING APP SHARED VISIT CONTRIBUTION OF CARE
I, Daya Alcantar MD, have reviewed the ACP's documentation. After personally examining the patient, getting an independent history, and formulating an MDM, my findings have been added/edited to this documentation as indicated.

## 2023-06-21 NOTE — ED PROVIDER NOTE - ATTENDING APP SHARED VISIT CONTRIBUTION OF CARE
I, Angela Sarmiento, performed the initial face to face bedside interview with this patient regarding history of present illness, review of symptoms and relevant past medical, social and family history.  I completed an independent physical examination.  I was the initial provider who evaluated this patient. I have signed out the follow up of any pending tests (i.e. labs, radiological studies) to the ACP.  I have communicated the patient’s plan of care and disposition with the ACP.

## 2023-06-21 NOTE — ED ADULT NURSE REASSESSMENT NOTE - NS ED NURSE REASSESS COMMENT FT1
C/p assumed from Maia VAZQUEZ. no S&S of acute distress, pt resting comfortably on stretcher. pt denies CP/pressure/tightness, palpitations, SOB/dyspnea, dizziness/lightheadedness/blurry vision, tingling, fevers/chills, N/V/D, urinary S&S. C/O headache 4/10 to frontal head and numbness b/l 1st and 2nd toe for the past three weeks. awaiting US, CT, and 2nd unit of PRBCs. pt says they had LLQ pain earlier, but is no longer present. plan of care reviewed with pt. pt in understanding of plan of care.
Late Note  PRBC transfusion initiated, consent scanned into chart. Vital signs stable. Second RN Gail Jones at bedside for confirmation. Pt tolerating well with no signs of transfusion reaction at 15 minute check.
tolerated 2U PRBC well. VSS. awaiting repeat labs at 0100.
care assumed from GEORGE Fischer. patient AAO x 4, on obs pending additional unit PRBC. hx of anemia on iron outpatiently. received call from PCP to come to ED for low h/h. tolerated first unit well. all radiology tests completed. endorses intermittent abdominal pain, hx of kidney stones. all questions answered. bed in lowest position, call bell within reach. tele monitoring in use.

## 2023-06-21 NOTE — ED ADULT NURSE NOTE - CHIEF COMPLAINT QUOTE
sent in by PCP for low hemoglobin 6.8. c/o generalized weakness and headache for last couple of days. denies chest pain or sob.
I have reviewed and confirmed nurses' notes...

## 2023-06-21 NOTE — ED CDU PROVIDER INITIAL DAY NOTE - OBJECTIVE STATEMENT
42 yo female c/o pressure HA unlike her migraines, swelling legs BLE.  Recently decreased from 25 mg to 12.5 mg HCTZ.  Had lab wor4k at PCP and HgB was low.  hx anemia was on iron in past but when it improved iron stopped.  chart documents hx hydronephrosis but pt unaware of this dx 42 yo female c/o pressure HA unlike her migraines, swelling legs BLE.  Recently decreased from 25 mg to 12.5 mg HCTZ.  Had lab wor4k at PCP and HgB was low.  hx anemia was on iron in past but when it improved iron stopped.  chart documents hx hydronephrosis but pt unaware of this dx. states she does not know why she has anemia.

## 2023-06-21 NOTE — ED PROVIDER NOTE - CLINICAL SUMMARY MEDICAL DECISION MAKING FREE TEXT BOX
Pt sent for abnl lab  recent decrease in BP med dose  has HA and swelling ankles/feet  pe as documented  needs labs ua meds for ha  if anemic, can transfuse  if abnormal bun/cre or UA can investigate further  will recheck BP to see if improved w BP meds  may need to go back on higher dose BP meds

## 2023-06-21 NOTE — ED PROVIDER NOTE - CARDIAC, MLM
Normal rate, regular rhythm.  Heart sounds S1, S2.  No murmurs, rubs or gallops.1- 2+ pitting edema BLE

## 2023-06-21 NOTE — ED PROVIDER NOTE - OBJECTIVE STATEMENT
44 yo female  pressure HA unlike her migraines  swelling legs BLE  Recently decreased from 25 mg to 12.5 mg HCTZ  Had lab wor4k at PCP and HgB was low  hx anemia was on iron in past but when it improved irone stopped  chart documents hx hydronephrosis but pt unaware of this dx

## 2023-06-21 NOTE — ED ADULT TRIAGE NOTE - CHIEF COMPLAINT QUOTE
sent in by PCP for low hemoglobin 6.8. c/o generalized weakness and headache for last couple of days. denies chest pain or sob.

## 2023-06-21 NOTE — ED ADULT NURSE REASSESSMENT NOTE - NSFALLUNIVINTERV_ED_ALL_ED
Bed/Stretcher in lowest position, wheels locked, appropriate side rails in place/Call bell, personal items and telephone in reach/Instruct patient to call for assistance before getting out of bed/chair/stretcher/Non-slip footwear applied when patient is off stretcher/[place] to call system/Physically safe environment - no spills, clutter or unnecessary equipment/Purposeful proactive rounding/Room/bathroom lighting operational, light cord in reach

## 2023-06-21 NOTE — ED ADULT NURSE NOTE - NSFALLUNIVINTERV_ED_ALL_ED
Bed/Stretcher in lowest position, wheels locked, appropriate side rails in place/Call bell, personal items and telephone in reach/Instruct patient to call for assistance before getting out of bed/chair/stretcher/Non-slip footwear applied when patient is off stretcher/[place] to call system/Physically safe environment - no spills, clutter or unnecessary equipment/Purposeful proactive rounding/Room/bathroom lighting operational, light cord in reach

## 2023-06-22 VITALS
RESPIRATION RATE: 18 BRPM | SYSTOLIC BLOOD PRESSURE: 135 MMHG | HEART RATE: 80 BPM | DIASTOLIC BLOOD PRESSURE: 91 MMHG | OXYGEN SATURATION: 96 % | TEMPERATURE: 98 F

## 2023-06-22 LAB
HCT VFR BLD CALC: 31.7 % — LOW (ref 34.5–45)
HGB BLD-MCNC: 9.7 G/DL — LOW (ref 11.5–15.5)
MCHC RBC-ENTMCNC: 20.4 PG — LOW (ref 27–34)
MCHC RBC-ENTMCNC: 30.6 GM/DL — LOW (ref 32–36)
MCV RBC AUTO: 66.6 FL — LOW (ref 80–100)
PLATELET # BLD AUTO: 613 K/UL — HIGH (ref 150–400)
RBC # BLD: 4.76 M/UL — SIGNIFICANT CHANGE UP (ref 3.8–5.2)
RBC # FLD: 22.2 % — HIGH (ref 10.3–14.5)
WBC # BLD: 13.3 K/UL — HIGH (ref 3.8–10.5)
WBC # FLD AUTO: 13.3 K/UL — HIGH (ref 3.8–10.5)

## 2023-06-22 PROCEDURE — 99238 HOSP IP/OBS DSCHRG MGMT 30/<: CPT

## 2023-06-22 PROCEDURE — P9040: CPT

## 2023-06-22 PROCEDURE — 76830 TRANSVAGINAL US NON-OB: CPT

## 2023-06-22 PROCEDURE — 85025 COMPLETE CBC W/AUTO DIFF WBC: CPT

## 2023-06-22 PROCEDURE — 85027 COMPLETE CBC AUTOMATED: CPT

## 2023-06-22 PROCEDURE — 84702 CHORIONIC GONADOTROPIN TEST: CPT

## 2023-06-22 PROCEDURE — 86850 RBC ANTIBODY SCREEN: CPT

## 2023-06-22 PROCEDURE — 84484 ASSAY OF TROPONIN QUANT: CPT

## 2023-06-22 PROCEDURE — 76856 US EXAM PELVIC COMPLETE: CPT

## 2023-06-22 PROCEDURE — 86901 BLOOD TYPING SEROLOGIC RH(D): CPT

## 2023-06-22 PROCEDURE — P9016: CPT

## 2023-06-22 PROCEDURE — 81001 URINALYSIS AUTO W/SCOPE: CPT

## 2023-06-22 PROCEDURE — G0378: CPT

## 2023-06-22 PROCEDURE — 96374 THER/PROPH/DIAG INJ IV PUSH: CPT | Mod: XU

## 2023-06-22 PROCEDURE — 96375 TX/PRO/DX INJ NEW DRUG ADDON: CPT

## 2023-06-22 PROCEDURE — 99284 EMERGENCY DEPT VISIT MOD MDM: CPT | Mod: 25

## 2023-06-22 PROCEDURE — 86923 COMPATIBILITY TEST ELECTRIC: CPT

## 2023-06-22 PROCEDURE — 36430 TRANSFUSION BLD/BLD COMPNT: CPT

## 2023-06-22 PROCEDURE — 87086 URINE CULTURE/COLONY COUNT: CPT

## 2023-06-22 PROCEDURE — 86900 BLOOD TYPING SEROLOGIC ABO: CPT

## 2023-06-22 PROCEDURE — 80048 BASIC METABOLIC PNL TOTAL CA: CPT

## 2023-06-22 PROCEDURE — 93005 ELECTROCARDIOGRAM TRACING: CPT

## 2023-06-22 PROCEDURE — 36415 COLL VENOUS BLD VENIPUNCTURE: CPT

## 2023-06-22 PROCEDURE — 74177 CT ABD & PELVIS W/CONTRAST: CPT | Mod: MA

## 2023-06-22 NOTE — ED CDU PROVIDER DISPOSITION NOTE - PATIENT PORTAL LINK FT
You can access the FollowMyHealth Patient Portal offered by [facility] by registering at the following website: [email or web address]. By joining [employer]’s FollowMyHealth portal, you will also be able to view your health information using other applications (apps) compatible with our system.

## 2023-06-22 NOTE — ED CDU PROVIDER SUBSEQUENT DAY NOTE - CLINICAL SUMMARY MEDICAL DECISION MAKING FREE TEXT BOX
headache add on reglan  labs indicate anemia, transfuse x 2 U  T&S, consented by ED initial team  anema 2/2 menorragia. 2 units PRBC. Pt to follow up with GYN

## 2023-06-22 NOTE — ED CDU PROVIDER DISPOSITION NOTE - NSFOLLOWUPINSTRUCTIONS_ED_ALL_ED_FT
- Follow up with GYN  - Return to ED for new or worsening symptoms    Anemia    Anemia is a condition in which the concentration of red blood cells or hemoglobin in the blood is below normal. Hemoglobin is a substance in red blood cells that carries oxygen to the tissues of the body. Anemia results in not enough oxygen reaching these tissues which can cause symptoms such as weakness, dizziness/lightheadedness, shortness of breath, chest pain, paleness, or nausea. The cause of your anemia may or may not be determined immediately. If your hemoglobin was dangerously low, you may have received a blood transfusion. Usually reactions to transfusions occur immediately but monitor yourself for any fevers, rash, or shortness of breath.    SEEK IMMEDIATE MEDICAL CARE IF YOU HAVE ANY OF THE FOLLOWING SYMPTOMS: extreme weakness/chest pain/shortness of breath, black or bloody stools, vomiting blood, fainting, fever, or any signs of dehydration.

## 2023-06-22 NOTE — ED CDU PROVIDER DISPOSITION NOTE - CLINICAL COURSE
Pt found to be anemia out patient, 7.1HGB in ED 2/2 menorrhagia. 2unit PRBC ordered.   Labs/Imagining negative for acute pathology  pt to follow up with GYN Pt found to be anemia out patient, 7.1HGB in ED 2/2 menorrhagia. 2unit PRBC ordered.   Labs/Imagining negative for acute pathology  pt to follow up with GYN\    Pt reassessed, pt feeling better at this time, vss, pt able to walk, talk and vocalized plan of action. Discussed in depth and explained to pt in depth the next steps that need to be taken including proper follow up with PCP or specialists. All incidental findings were discussed with pt as well. Pt verbalized their concerns and all questions were answered. Pt understands dispo and wants discharge. Given good instructions when to return to ED, strict return precautions and importance of f/u.

## 2023-06-23 LAB
CULTURE RESULTS: SIGNIFICANT CHANGE UP
SPECIMEN SOURCE: SIGNIFICANT CHANGE UP

## 2023-06-26 LAB
HGB A MFR BLD: 98.1 %
HGB A2 MFR BLD: 1.9 %
HGB FRACT BLD-IMP: NORMAL

## 2023-06-28 ENCOUNTER — EMERGENCY (EMERGENCY)
Facility: HOSPITAL | Age: 43
LOS: 1 days | Discharge: HOME | End: 2023-06-28
Attending: EMERGENCY MEDICINE
Payer: COMMERCIAL

## 2023-06-28 VITALS
HEIGHT: 66 IN | WEIGHT: 194.45 LBS | DIASTOLIC BLOOD PRESSURE: 72 MMHG | RESPIRATION RATE: 16 BRPM | HEART RATE: 82 BPM | TEMPERATURE: 99 F | SYSTOLIC BLOOD PRESSURE: 109 MMHG | OXYGEN SATURATION: 97 %

## 2023-06-28 VITALS — HEART RATE: 83 BPM | TEMPERATURE: 98 F | RESPIRATION RATE: 20 BRPM | OXYGEN SATURATION: 100 %

## 2023-06-28 DIAGNOSIS — Z98.89 OTHER SPECIFIED POSTPROCEDURAL STATES: Chronic | ICD-10-CM

## 2023-06-28 DIAGNOSIS — N20.0 CALCULUS OF KIDNEY: Chronic | ICD-10-CM

## 2023-06-28 LAB
ALBUMIN SERPL ELPH-MCNC: 3.9 G/DL — SIGNIFICANT CHANGE UP (ref 3.3–5.2)
ALP SERPL-CCNC: 61 U/L — SIGNIFICANT CHANGE UP (ref 40–120)
ALT FLD-CCNC: 9 U/L — SIGNIFICANT CHANGE UP
ANION GAP SERPL CALC-SCNC: 12 MMOL/L — SIGNIFICANT CHANGE UP (ref 5–17)
ANISOCYTOSIS BLD QL: SIGNIFICANT CHANGE UP
APPEARANCE UR: CLEAR — SIGNIFICANT CHANGE UP
APTT BLD: 27.5 SEC — SIGNIFICANT CHANGE UP (ref 27.5–35.5)
AST SERPL-CCNC: 25 U/L — SIGNIFICANT CHANGE UP
BASOPHILS # BLD AUTO: 0.06 K/UL — SIGNIFICANT CHANGE UP (ref 0–0.2)
BASOPHILS NFR BLD AUTO: 0.6 % — SIGNIFICANT CHANGE UP (ref 0–2)
BILIRUB SERPL-MCNC: 0.2 MG/DL — LOW (ref 0.4–2)
BILIRUB UR-MCNC: NEGATIVE — SIGNIFICANT CHANGE UP
BLD GP AB SCN SERPL QL: SIGNIFICANT CHANGE UP
BUN SERPL-MCNC: 14 MG/DL — SIGNIFICANT CHANGE UP (ref 8–20)
CALCIUM SERPL-MCNC: 9.2 MG/DL — SIGNIFICANT CHANGE UP (ref 8.4–10.5)
CHLORIDE SERPL-SCNC: 102 MMOL/L — SIGNIFICANT CHANGE UP (ref 96–108)
CO2 SERPL-SCNC: 21 MMOL/L — LOW (ref 22–29)
COLOR SPEC: YELLOW — SIGNIFICANT CHANGE UP
CREAT SERPL-MCNC: 0.92 MG/DL — SIGNIFICANT CHANGE UP (ref 0.5–1.3)
DIFF PNL FLD: NEGATIVE — SIGNIFICANT CHANGE UP
EGFR: 79 ML/MIN/1.73M2 — SIGNIFICANT CHANGE UP
EOSINOPHIL # BLD AUTO: 0.19 K/UL — SIGNIFICANT CHANGE UP (ref 0–0.5)
EOSINOPHIL NFR BLD AUTO: 2 % — SIGNIFICANT CHANGE UP (ref 0–6)
EPI CELLS # UR: SIGNIFICANT CHANGE UP
GLUCOSE SERPL-MCNC: 93 MG/DL — SIGNIFICANT CHANGE UP (ref 70–99)
GLUCOSE UR QL: NEGATIVE MG/DL — SIGNIFICANT CHANGE UP
HCG SERPL-ACNC: <4 MIU/ML — SIGNIFICANT CHANGE UP
HCT VFR BLD CALC: 34.4 % — LOW (ref 34.5–45)
HGB BLD-MCNC: 10.4 G/DL — LOW (ref 11.5–15.5)
HYPOCHROMIA BLD QL: SIGNIFICANT CHANGE UP
IMM GRANULOCYTES NFR BLD AUTO: 0.3 % — SIGNIFICANT CHANGE UP (ref 0–0.9)
INR BLD: 1.09 RATIO — SIGNIFICANT CHANGE UP (ref 0.88–1.16)
KETONES UR-MCNC: NEGATIVE — SIGNIFICANT CHANGE UP
LEUKOCYTE ESTERASE UR-ACNC: ABNORMAL
LYMPHOCYTES # BLD AUTO: 2.01 K/UL — SIGNIFICANT CHANGE UP (ref 1–3.3)
LYMPHOCYTES # BLD AUTO: 21.2 % — SIGNIFICANT CHANGE UP (ref 13–44)
MAGNESIUM SERPL-MCNC: 2.1 MG/DL — SIGNIFICANT CHANGE UP (ref 1.6–2.6)
MANUAL SMEAR VERIFICATION: SIGNIFICANT CHANGE UP
MCHC RBC-ENTMCNC: 20.6 PG — LOW (ref 27–34)
MCHC RBC-ENTMCNC: 30.2 GM/DL — LOW (ref 32–36)
MCV RBC AUTO: 68.1 FL — LOW (ref 80–100)
MICROCYTES BLD QL: SLIGHT — SIGNIFICANT CHANGE UP
MONOCYTES # BLD AUTO: 0.7 K/UL — SIGNIFICANT CHANGE UP (ref 0–0.9)
MONOCYTES NFR BLD AUTO: 7.4 % — SIGNIFICANT CHANGE UP (ref 2–14)
NEUTROPHILS # BLD AUTO: 6.5 K/UL — SIGNIFICANT CHANGE UP (ref 1.8–7.4)
NEUTROPHILS NFR BLD AUTO: 68.5 % — SIGNIFICANT CHANGE UP (ref 43–77)
NITRITE UR-MCNC: NEGATIVE — SIGNIFICANT CHANGE UP
OVALOCYTES BLD QL SMEAR: SLIGHT — SIGNIFICANT CHANGE UP
PH UR: 6 — SIGNIFICANT CHANGE UP (ref 5–8)
PLAT MORPH BLD: NORMAL — SIGNIFICANT CHANGE UP
PLATELET # BLD AUTO: 455 K/UL — HIGH (ref 150–400)
POIKILOCYTOSIS BLD QL AUTO: SLIGHT — SIGNIFICANT CHANGE UP
POLYCHROMASIA BLD QL SMEAR: SLIGHT — SIGNIFICANT CHANGE UP
POTASSIUM SERPL-MCNC: 4.5 MMOL/L — SIGNIFICANT CHANGE UP (ref 3.5–5.3)
POTASSIUM SERPL-SCNC: 4.5 MMOL/L — SIGNIFICANT CHANGE UP (ref 3.5–5.3)
PROT SERPL-MCNC: 7.2 G/DL — SIGNIFICANT CHANGE UP (ref 6.6–8.7)
PROT UR-MCNC: NEGATIVE — SIGNIFICANT CHANGE UP
PROTHROM AB SERPL-ACNC: 12.7 SEC — SIGNIFICANT CHANGE UP (ref 10.5–13.4)
RBC # BLD: 5.05 M/UL — SIGNIFICANT CHANGE UP (ref 3.8–5.2)
RBC # FLD: 23.2 % — HIGH (ref 10.3–14.5)
RBC BLD AUTO: ABNORMAL
RBC CASTS # UR COMP ASSIST: NEGATIVE /HPF — SIGNIFICANT CHANGE UP (ref 0–4)
SODIUM SERPL-SCNC: 135 MMOL/L — SIGNIFICANT CHANGE UP (ref 135–145)
SP GR SPEC: 1.02 — SIGNIFICANT CHANGE UP (ref 1.01–1.02)
TSH SERPL-MCNC: 2.06 UIU/ML — SIGNIFICANT CHANGE UP (ref 0.27–4.2)
UROBILINOGEN FLD QL: 1 MG/DL
WBC # BLD: 9.49 K/UL — SIGNIFICANT CHANGE UP (ref 3.8–10.5)
WBC # FLD AUTO: 9.49 K/UL — SIGNIFICANT CHANGE UP (ref 3.8–10.5)
WBC UR QL: SIGNIFICANT CHANGE UP /HPF (ref 0–5)

## 2023-06-28 PROCEDURE — 81001 URINALYSIS AUTO W/SCOPE: CPT

## 2023-06-28 PROCEDURE — 83735 ASSAY OF MAGNESIUM: CPT

## 2023-06-28 PROCEDURE — 99284 EMERGENCY DEPT VISIT MOD MDM: CPT

## 2023-06-28 PROCEDURE — 84443 ASSAY THYROID STIM HORMONE: CPT

## 2023-06-28 PROCEDURE — 85730 THROMBOPLASTIN TIME PARTIAL: CPT

## 2023-06-28 PROCEDURE — 85025 COMPLETE CBC W/AUTO DIFF WBC: CPT

## 2023-06-28 PROCEDURE — 80053 COMPREHEN METABOLIC PANEL: CPT

## 2023-06-28 PROCEDURE — 99283 EMERGENCY DEPT VISIT LOW MDM: CPT

## 2023-06-28 PROCEDURE — 86901 BLOOD TYPING SEROLOGIC RH(D): CPT

## 2023-06-28 PROCEDURE — 85610 PROTHROMBIN TIME: CPT

## 2023-06-28 PROCEDURE — 84702 CHORIONIC GONADOTROPIN TEST: CPT

## 2023-06-28 PROCEDURE — 86900 BLOOD TYPING SEROLOGIC ABO: CPT

## 2023-06-28 PROCEDURE — 86850 RBC ANTIBODY SCREEN: CPT

## 2023-06-28 PROCEDURE — 36415 COLL VENOUS BLD VENIPUNCTURE: CPT

## 2023-06-28 RX ORDER — SODIUM CHLORIDE 9 MG/ML
1000 INJECTION INTRAMUSCULAR; INTRAVENOUS; SUBCUTANEOUS ONCE
Refills: 0 | Status: COMPLETED | OUTPATIENT
Start: 2023-06-28 | End: 2023-06-28

## 2023-06-28 RX ADMIN — SODIUM CHLORIDE 1000 MILLILITER(S): 9 INJECTION INTRAMUSCULAR; INTRAVENOUS; SUBCUTANEOUS at 23:11

## 2023-06-28 NOTE — ED PROVIDER NOTE - CLINICAL SUMMARY MEDICAL DECISION MAKING FREE TEXT BOX
Pt with description of near syncope with position change, notes some frequent urination but no dysuria, h/o renal stones, not in significant pain, mild flank pain now. afebrile. labs with stable Hgb, normal renal funciton and UA. improvement after hydration. will recommend outpt Follow up with Uro and heme

## 2023-06-28 NOTE — ED ADULT TRIAGE NOTE - CHIEF COMPLAINT QUOTE
pt comes to ed from home for eval of dizziness. Patient reports that she was here last week and had 2 blood transfusions due to anemia- no acute blood loss that patient is aware of. Today, when changing positions is feeling dizzy nad lightheaded so instructed by PCP to come back to ED. pale conjunctiva.

## 2023-06-28 NOTE — ED ADULT TRIAGE NOTE - WAS YOUR LAST COVID-19 VACCINE GREATER THAN OR EQUAL TO TWO MONTHS AGO?
Yes Detail Level: Simple Additional Notes: Unclear etiology. Pt reports decreased sweating which could suggest miliaria however could also consider eczematous process based on exam. Start TAC 0.1% ointment BID x 2 weeks. RTC in 6 weeks for f/u.

## 2023-06-28 NOTE — ED PROVIDER NOTE - PATIENT PORTAL LINK FT
You can access the FollowMyHealth Patient Portal offered by [facility] by registering at the following website: [email or web address]. By joining [facility] FollowMyHealth portal, you will also be able to view your health information using other applications (apps) compatible with our system.

## 2023-06-28 NOTE — ED PROVIDER NOTE - OBJECTIVE STATEMENT
43-year-old female with hypertension depression anxiety new onset anemia was in the ED last week for transfusions no bleeding no hematochezia no melena.  Last 2 days worsening dizziness when changing position sit to stand bending forward to sitting up.  Feels woozy like going to pass out no room spinning no headache no chest pain no shortness of breath.  Does feel fatigued.  Has not seen hematologist yet no fevers no noted 99 3 on arrival no cough no leg swelling no focal weakness 43-year-old female with hypertension depression anxiety new onset anemia was in the ED last week for transfusions no bleeding no hematochezia no melena.  Last 2 days worsening dizziness when changing position sit to stand bending forward to sitting up.  Feels woozy like going to pass out no room spinning no headache no chest pain no shortness of breath.  Does feel fatigued.  Has not seen hematologist yet no fevers no noted 99 3 on arrival no cough no leg swelling no focal weakness Notes some urinary frequency no dysuria and recurrent rt flank pain similar to stones in the past

## 2023-06-28 NOTE — ED PROVIDER NOTE - NSFOLLOWUPINSTRUCTIONS_ED_ALL_ED_FT
1. Return to ED for worsening, progressive or any other concerning symptoms   2. Follow up with your primary care doctor in 2-3days   3. Follow up with hematology and Urology     Syncope    Syncope is when you temporarily lose consciousness, also called fainting or passing out. It is caused by a sudden decrease in blood flow to the brain. Even though most causes of syncope are not dangerous, syncope can possibly be a sign of a serious medical problem. Signs that you may be about to faint include feeling dizzy, lightheaded, nausea, visual changes, or cold/clammy skin. Do not drive, operate heavy machinery, or play sports until your health care provider says it is okay.    SEEK IMMEDIATE MEDICAL CARE IF YOU HAVE ANY OF THE FOLLOWING SYMPTOMS: severe headache, pain in your chest/abdomen/back, bleeding from your mouth or rectum, palpitations, shortness of breath, pain with breathing, seizure, confusion, or trouble walking.

## 2023-06-28 NOTE — ED PROVIDER NOTE - PROGRESS NOTE DETAILS
improved after hydration, negative UA, normal renal funciton, Hgb improved from previous transfusions. not orthostatic after fluids. TBSPENSER -Carin DO

## 2023-06-28 NOTE — ED PROVIDER NOTE - CARE PROVIDER_API CALL
Anatoly Hayes.  Hematology  [address] [place] [address]  Phone: [phone]  Fax: [phone]  Follow Up Time: 4-6 Days

## 2023-06-28 NOTE — ED PROVIDER NOTE - DISPOSITION TYPE
58 yr old  M with  hx HTN, DM-2 , BPH presents as follow up for prostatitis ,found to have E colic resistant to PO abx on U/culture .    # Acute prostatitis resistant to PO abx ;  in setting of BPH with obstructive symptoms   WBCs improving   follow blood culture   ID consultation   will likely need iv abx at discharge   cw Meropenem  for now   started on PO Flomax ,cw [place]   Urology consultation       # D.M -2 ;  cw insulin Lantus   d/c oral meds   cw FS monitoring     #HTN ;   taking Enalapril 10 mg BID at home   DASH diet     # transaminitis ;  likely 2/2 abx   trend LFts    if trending up than consider US liver    # Hemolyzed BMP with high K +;  repeats CMP     # DVT ppx with Lovenox   DASH /crab consistent diet   ambulate as tolerated   FULL code     #dispo; after ID eval   will likely need iv abx at discharge     PLZ CONFIRM MEDS WITH [facility] ,WHICH IS CLOSED ON SUNDAY ,PT DON'T REMEMBER ALL MEDS . DISCHARGE

## 2023-08-10 NOTE — ASU PATIENT PROFILE, ADULT - PT NEEDS ASSIST
Quality 110: Preventive Care And Screening: Influenza Immunization: Influenza Immunization Administered during Influenza season Name And Contact Information For Health Care Proxy: Chaz Fitch [phone] Quality 431: Preventive Care And Screening: Unhealthy Alcohol Use - Screening: Patient not identified as an unhealthy alcohol user when screened for unhealthy alcohol use using a systematic screening method Quality 226: Preventive Care And Screening: Tobacco Use: Screening And Cessation Intervention: Patient screened for tobacco use and is an ex/non-smoker Quality 130: Documentation Of Current Medications In The Medical Record: Current Medications Documented Detail Level: Detailed no

## 2023-09-13 ENCOUNTER — APPOINTMENT (OUTPATIENT)
Dept: HEMATOLOGY ONCOLOGY | Facility: CLINIC | Age: 43
End: 2023-09-13
Payer: COMMERCIAL

## 2023-09-13 ENCOUNTER — OUTPATIENT (OUTPATIENT)
Dept: OUTPATIENT SERVICES | Facility: HOSPITAL | Age: 43
LOS: 1 days | Discharge: HOME | End: 2023-09-13

## 2023-09-13 ENCOUNTER — RESULT REVIEW (OUTPATIENT)
Age: 43
End: 2023-09-13

## 2023-09-13 VITALS
WEIGHT: 194 LBS | HEIGHT: 66.5 IN | DIASTOLIC BLOOD PRESSURE: 92 MMHG | OXYGEN SATURATION: 98 % | TEMPERATURE: 98.2 F | BODY MASS INDEX: 30.81 KG/M2 | SYSTOLIC BLOOD PRESSURE: 144 MMHG

## 2023-09-13 DIAGNOSIS — N20.0 CALCULUS OF KIDNEY: Chronic | ICD-10-CM

## 2023-09-13 DIAGNOSIS — D47.3 ESSENTIAL (HEMORRHAGIC) THROMBOCYTHEMIA: ICD-10-CM

## 2023-09-13 DIAGNOSIS — Z98.89 OTHER SPECIFIED POSTPROCEDURAL STATES: Chronic | ICD-10-CM

## 2023-09-13 LAB
BASOPHILS # BLD AUTO: 0.1 K/UL — SIGNIFICANT CHANGE UP (ref 0–0.2)
BASOPHILS NFR BLD AUTO: 0.9 % — SIGNIFICANT CHANGE UP (ref 0–2)
EOSINOPHIL # BLD AUTO: 0.2 K/UL — SIGNIFICANT CHANGE UP (ref 0–0.5)
EOSINOPHIL NFR BLD AUTO: 2.4 % — SIGNIFICANT CHANGE UP (ref 0–6)
HCT VFR BLD CALC: 32.6 % — LOW (ref 34.5–45)
HGB BLD-MCNC: 10.2 G/DL — LOW (ref 11.5–15.5)
LYMPHOCYTES # BLD AUTO: 1.8 K/UL — SIGNIFICANT CHANGE UP (ref 1–3.3)
LYMPHOCYTES # BLD AUTO: 22.6 % — SIGNIFICANT CHANGE UP (ref 13–44)
MCHC RBC-ENTMCNC: 22.2 PG — LOW (ref 27–34)
MCHC RBC-ENTMCNC: 31.1 G/DL — LOW (ref 32–36)
MCV RBC AUTO: 71.3 FL — LOW (ref 80–100)
MONOCYTES # BLD AUTO: 0.6 K/UL — SIGNIFICANT CHANGE UP (ref 0–0.9)
MONOCYTES NFR BLD AUTO: 6.9 % — SIGNIFICANT CHANGE UP (ref 2–14)
NEUTROPHILS # BLD AUTO: 5.5 K/UL — SIGNIFICANT CHANGE UP (ref 1.8–7.4)
NEUTROPHILS NFR BLD AUTO: 67.2 % — SIGNIFICANT CHANGE UP (ref 43–77)
PLATELET # BLD AUTO: 490 K/UL — HIGH (ref 150–400)
RBC # BLD: 4.57 M/UL — SIGNIFICANT CHANGE UP (ref 3.8–5.2)
RBC # FLD: 16.9 % — HIGH (ref 10.3–14.5)
WBC # BLD: 8.1 K/UL — SIGNIFICANT CHANGE UP (ref 3.8–10.5)
WBC # FLD AUTO: 8.1 K/UL — SIGNIFICANT CHANGE UP (ref 3.8–10.5)

## 2023-09-13 PROCEDURE — 99214 OFFICE O/P EST MOD 30 MIN: CPT

## 2023-09-15 LAB
FERRITIN SERPL-MCNC: 5 NG/ML
IRON SATN MFR SERPL: 4 %
IRON SERPL-MCNC: 20 UG/DL
TIBC SERPL-MCNC: 498 UG/DL
TRANSFERRIN SERPL-MCNC: 390 MG/DL
UIBC SERPL-MCNC: 478 UG/DL

## 2023-09-20 ENCOUNTER — APPOINTMENT (OUTPATIENT)
Age: 43
End: 2023-09-20

## 2023-09-26 DIAGNOSIS — D50.9 IRON DEFICIENCY ANEMIA, UNSPECIFIED: ICD-10-CM

## 2023-09-27 ENCOUNTER — APPOINTMENT (OUTPATIENT)
Age: 43
End: 2023-09-27

## 2023-10-05 ENCOUNTER — APPOINTMENT (OUTPATIENT)
Dept: HEMATOLOGY ONCOLOGY | Facility: CLINIC | Age: 43
End: 2023-10-05
Payer: COMMERCIAL

## 2023-10-05 ENCOUNTER — APPOINTMENT (OUTPATIENT)
Age: 43
End: 2023-10-05

## 2023-10-05 ENCOUNTER — RESULT REVIEW (OUTPATIENT)
Age: 43
End: 2023-10-05

## 2023-10-05 VITALS
SYSTOLIC BLOOD PRESSURE: 136 MMHG | DIASTOLIC BLOOD PRESSURE: 95 MMHG | HEIGHT: 66.5 IN | OXYGEN SATURATION: 99 % | BODY MASS INDEX: 31.04 KG/M2 | WEIGHT: 195.44 LBS | HEART RATE: 79 BPM

## 2023-10-05 LAB
BASOPHILS # BLD AUTO: 0.1 K/UL — SIGNIFICANT CHANGE UP (ref 0–0.2)
BASOPHILS NFR BLD AUTO: 0.7 % — SIGNIFICANT CHANGE UP (ref 0–2)
EOSINOPHIL # BLD AUTO: 0.1 K/UL — SIGNIFICANT CHANGE UP (ref 0–0.5)
EOSINOPHIL NFR BLD AUTO: 1.4 % — SIGNIFICANT CHANGE UP (ref 0–6)
HCT VFR BLD CALC: 36.6 % — SIGNIFICANT CHANGE UP (ref 34.5–45)
HGB BLD-MCNC: 11.1 G/DL — LOW (ref 11.5–15.5)
LYMPHOCYTES # BLD AUTO: 1.3 K/UL — SIGNIFICANT CHANGE UP (ref 1–3.3)
LYMPHOCYTES # BLD AUTO: 15.7 % — SIGNIFICANT CHANGE UP (ref 13–44)
MCHC RBC-ENTMCNC: 23.6 PG — LOW (ref 27–34)
MCHC RBC-ENTMCNC: 30.3 G/DL — LOW (ref 32–36)
MCV RBC AUTO: 77.6 FL — LOW (ref 80–100)
MONOCYTES # BLD AUTO: 0.4 K/UL — SIGNIFICANT CHANGE UP (ref 0–0.9)
MONOCYTES NFR BLD AUTO: 5.2 % — SIGNIFICANT CHANGE UP (ref 2–14)
NEUTROPHILS # BLD AUTO: 6.3 K/UL — SIGNIFICANT CHANGE UP (ref 1.8–7.4)
NEUTROPHILS NFR BLD AUTO: 77 % — SIGNIFICANT CHANGE UP (ref 43–77)
PLATELET # BLD AUTO: 442 K/UL — HIGH (ref 150–400)
RBC # BLD: 4.72 M/UL — SIGNIFICANT CHANGE UP (ref 3.8–5.2)
RBC # FLD: 22.2 % — HIGH (ref 10.3–14.5)
WBC # BLD: 8.2 K/UL — SIGNIFICANT CHANGE UP (ref 3.8–10.5)
WBC # FLD AUTO: 8.2 K/UL — SIGNIFICANT CHANGE UP (ref 3.8–10.5)

## 2023-10-05 PROCEDURE — 99213 OFFICE O/P EST LOW 20 MIN: CPT

## 2023-10-06 LAB
FERRITIN SERPL-MCNC: 62 NG/ML
IRON SATN MFR SERPL: 7 %
IRON SERPL-MCNC: 30 UG/DL
TIBC SERPL-MCNC: 424 UG/DL
UIBC SERPL-MCNC: 394 UG/DL

## 2023-10-11 ENCOUNTER — APPOINTMENT (OUTPATIENT)
Age: 43
End: 2023-10-11

## 2023-10-18 ENCOUNTER — APPOINTMENT (OUTPATIENT)
Age: 43
End: 2023-10-18

## 2023-10-19 ENCOUNTER — APPOINTMENT (OUTPATIENT)
Dept: INTERNAL MEDICINE | Facility: CLINIC | Age: 43
End: 2023-10-19
Payer: COMMERCIAL

## 2023-10-19 ENCOUNTER — LABORATORY RESULT (OUTPATIENT)
Age: 43
End: 2023-10-19

## 2023-10-19 VITALS
HEIGHT: 66.5 IN | SYSTOLIC BLOOD PRESSURE: 130 MMHG | DIASTOLIC BLOOD PRESSURE: 70 MMHG | BODY MASS INDEX: 30.97 KG/M2 | WEIGHT: 195 LBS

## 2023-10-19 DIAGNOSIS — G56.21 LESION OF ULNAR NERVE, RIGHT UPPER LIMB: ICD-10-CM

## 2023-10-19 PROCEDURE — 36415 COLL VENOUS BLD VENIPUNCTURE: CPT

## 2023-10-19 PROCEDURE — 99214 OFFICE O/P EST MOD 30 MIN: CPT | Mod: 25

## 2023-10-20 LAB
ALBUMIN SERPL ELPH-MCNC: 4.7 G/DL
ALP BLD-CCNC: 79 U/L
ALT SERPL-CCNC: 8 U/L
ANION GAP SERPL CALC-SCNC: 10 MMOL/L
AST SERPL-CCNC: 14 U/L
BASOPHILS # BLD AUTO: 0.06 K/UL
BASOPHILS NFR BLD AUTO: 0.9 %
BILIRUB SERPL-MCNC: 0.4 MG/DL
BUN SERPL-MCNC: 10 MG/DL
CALCIUM SERPL-MCNC: 10.1 MG/DL
CHLORIDE SERPL-SCNC: 103 MMOL/L
CHOLEST SERPL-MCNC: 163 MG/DL
CO2 SERPL-SCNC: 25 MMOL/L
CREAT SERPL-MCNC: 0.85 MG/DL
EGFR: 87 ML/MIN/1.73M2
EOSINOPHIL # BLD AUTO: 0.12 K/UL
EOSINOPHIL NFR BLD AUTO: 1.8 %
ESTIMATED AVERAGE GLUCOSE: 85 MG/DL
GLUCOSE SERPL-MCNC: 91 MG/DL
HBA1C MFR BLD HPLC: 4.6 %
HCT VFR BLD CALC: 42.1 %
HDLC SERPL-MCNC: 70 MG/DL
HGB BLD-MCNC: 12.3 G/DL
IMM GRANULOCYTES NFR BLD AUTO: 0.4 %
LDLC SERPL CALC-MCNC: 76 MG/DL
LYMPHOCYTES # BLD AUTO: 1.1 K/UL
LYMPHOCYTES NFR BLD AUTO: 16.3 %
MAGNESIUM SERPL-MCNC: 2.2 MG/DL
MAN DIFF?: NORMAL
MCHC RBC-ENTMCNC: 24.3 PG
MCHC RBC-ENTMCNC: 29.2 GM/DL
MCV RBC AUTO: 83 FL
MONOCYTES # BLD AUTO: 0.31 K/UL
MONOCYTES NFR BLD AUTO: 4.6 %
NEUTROPHILS # BLD AUTO: 5.11 K/UL
NEUTROPHILS NFR BLD AUTO: 76 %
NONHDLC SERPL-MCNC: 93 MG/DL
PLATELET # BLD AUTO: 399 K/UL
POTASSIUM SERPL-SCNC: 4.6 MMOL/L
PROT SERPL-MCNC: 7.5 G/DL
RBC # BLD: 5.07 M/UL
RBC # FLD: 26.4 %
SODIUM SERPL-SCNC: 139 MMOL/L
TRIGL SERPL-MCNC: 91 MG/DL
TSH SERPL-ACNC: 1.98 UIU/ML
WBC # FLD AUTO: 6.73 K/UL

## 2023-11-09 NOTE — ED ADULT TRIAGE NOTE - WEIGHT METHOD
CRITICAL CARE NOTE      Name: Kaci Hall Sr   : 1949   MRN: [id number]   Date: 2023      REASON FOR ICU ADMISSION:       PRINCIPAL ICU DIAGNOSIS   Acute stroke symptoms s/p TNk  BRIEF PATIENT SUMMARY   76 y/o PMMx of CAD s/p stent, CVA x 2, HTN, COPD, and anxiety admitted  from [facility] for acute ischemic stroke s/p TNK. History per patient he was hunting, after climbing out of tree stand had acute onset of R side weakness, unable to ambulate with subsequent ground- level fall (no injuries) CT head no acute findings, Tnk administered, CTA H/N with possible moderate to severe stenosis of the left anterior M2 branch, symptoms initially improved, prior to transfer per report patient had increasing weakness on the right side, CT head performed with no acute change, transferred to [facility] for further management of acute ischemic stroke s/p tenecteplase    ICU evaluation: A/0 x4, right facial asymmetry,RUE 3+/5, + drift, hand droop, RLE, + drift, 4/5. COMPREHENSIVE ASSESSMENT & PLAN:SYSTEM BASED   Fluctuating right-sided weakness seems to correlate with lower blood pressures, continue IVF  Neurology consult in AM    NEUROLOGICAL:   Acute ischemic stroke s/p TNK (, 1341)  Repeat head CT 24 hours post TNK, initiate aspirin if no signs of hemorrhagic conversion  Brain MRI-pending  Neurology consult in a.m.   Statin therapy  Neurological checks  Echo-pending    PULMONOLOGY:  Former tobacco use disorder (quit 2023)    Supplemental oxygen for goal saturation greater than 94%  HOB elevated, aspiration precautions    CARDIOVASCULAR:   Sinus rhythm on telemetry  Echo-pending  Keep SBP less then 180 mm Hg post TNk    GASTROINTESTINAL   Passed bedside swallow evaluation  Cardiac diet as tolerated    RENAL/ELECTROLYTE/FLUIDS:   Bun/Creatine- stable  Replete electrolytes as indicated  Goal urine output > 0.5 cc/kg/hr, Strict I/Os, avoid nephrotoxins    ENDOCRINE:   No known history of diabetes or thyroid stated

## 2023-11-27 ENCOUNTER — APPOINTMENT (OUTPATIENT)
Dept: OBGYN | Facility: CLINIC | Age: 43
End: 2023-11-27
Payer: COMMERCIAL

## 2023-11-27 VITALS
HEIGHT: 66.5 IN | HEART RATE: 108 BPM | DIASTOLIC BLOOD PRESSURE: 97 MMHG | BODY MASS INDEX: 30.97 KG/M2 | SYSTOLIC BLOOD PRESSURE: 138 MMHG | WEIGHT: 195 LBS

## 2023-11-27 PROCEDURE — 99204 OFFICE O/P NEW MOD 45 MIN: CPT

## 2023-11-27 RX ORDER — NORETHINDRONE ACETATE 5 MG/1
5 TABLET ORAL
Qty: 60 | Refills: 5 | Status: ACTIVE | COMMUNITY
Start: 2023-11-27 | End: 1900-01-01

## 2023-12-02 ENCOUNTER — OUTPATIENT (OUTPATIENT)
Dept: OUTPATIENT SERVICES | Facility: HOSPITAL | Age: 43
LOS: 1 days | Discharge: HOME | End: 2023-12-02

## 2023-12-02 DIAGNOSIS — Z98.89 OTHER SPECIFIED POSTPROCEDURAL STATES: Chronic | ICD-10-CM

## 2023-12-02 DIAGNOSIS — D50.9 IRON DEFICIENCY ANEMIA, UNSPECIFIED: ICD-10-CM

## 2023-12-02 DIAGNOSIS — N20.0 CALCULUS OF KIDNEY: Chronic | ICD-10-CM

## 2023-12-06 ENCOUNTER — OUTPATIENT (OUTPATIENT)
Dept: OUTPATIENT SERVICES | Facility: HOSPITAL | Age: 43
LOS: 1 days | Discharge: HOME | End: 2023-12-06

## 2023-12-06 DIAGNOSIS — N20.0 CALCULUS OF KIDNEY: Chronic | ICD-10-CM

## 2023-12-06 DIAGNOSIS — Z98.89 OTHER SPECIFIED POSTPROCEDURAL STATES: Chronic | ICD-10-CM

## 2023-12-06 DIAGNOSIS — D50.9 IRON DEFICIENCY ANEMIA, UNSPECIFIED: ICD-10-CM

## 2023-12-07 ENCOUNTER — RESULT REVIEW (OUTPATIENT)
Age: 43
End: 2023-12-07

## 2023-12-07 ENCOUNTER — APPOINTMENT (OUTPATIENT)
Dept: HEMATOLOGY ONCOLOGY | Facility: CLINIC | Age: 43
End: 2023-12-07
Payer: COMMERCIAL

## 2023-12-07 VITALS
HEIGHT: 66.5 IN | HEART RATE: 100 BPM | BODY MASS INDEX: 31.79 KG/M2 | WEIGHT: 200.18 LBS | SYSTOLIC BLOOD PRESSURE: 119 MMHG | OXYGEN SATURATION: 98 % | DIASTOLIC BLOOD PRESSURE: 87 MMHG | TEMPERATURE: 99.6 F

## 2023-12-07 LAB
BASOPHILS # BLD AUTO: 0.1 K/UL — SIGNIFICANT CHANGE UP (ref 0–0.2)
BASOPHILS # BLD AUTO: 0.1 K/UL — SIGNIFICANT CHANGE UP (ref 0–0.2)
BASOPHILS NFR BLD AUTO: 1.4 % — SIGNIFICANT CHANGE UP (ref 0–2)
BASOPHILS NFR BLD AUTO: 1.4 % — SIGNIFICANT CHANGE UP (ref 0–2)
EOSINOPHIL # BLD AUTO: 0.2 K/UL — SIGNIFICANT CHANGE UP (ref 0–0.5)
EOSINOPHIL # BLD AUTO: 0.2 K/UL — SIGNIFICANT CHANGE UP (ref 0–0.5)
EOSINOPHIL NFR BLD AUTO: 3.8 % — SIGNIFICANT CHANGE UP (ref 0–6)
EOSINOPHIL NFR BLD AUTO: 3.8 % — SIGNIFICANT CHANGE UP (ref 0–6)
HCT VFR BLD CALC: 39.3 % — SIGNIFICANT CHANGE UP (ref 34.5–45)
HCT VFR BLD CALC: 39.3 % — SIGNIFICANT CHANGE UP (ref 34.5–45)
HGB BLD-MCNC: 11.8 G/DL — SIGNIFICANT CHANGE UP (ref 11.5–15.5)
HGB BLD-MCNC: 11.8 G/DL — SIGNIFICANT CHANGE UP (ref 11.5–15.5)
LYMPHOCYTES # BLD AUTO: 1.8 K/UL — SIGNIFICANT CHANGE UP (ref 1–3.3)
LYMPHOCYTES # BLD AUTO: 1.8 K/UL — SIGNIFICANT CHANGE UP (ref 1–3.3)
LYMPHOCYTES # BLD AUTO: 27.1 % — SIGNIFICANT CHANGE UP (ref 13–44)
LYMPHOCYTES # BLD AUTO: 27.1 % — SIGNIFICANT CHANGE UP (ref 13–44)
MCHC RBC-ENTMCNC: 26.3 PG — LOW (ref 27–34)
MCHC RBC-ENTMCNC: 26.3 PG — LOW (ref 27–34)
MCHC RBC-ENTMCNC: 30 G/DL — LOW (ref 32–36)
MCHC RBC-ENTMCNC: 30 G/DL — LOW (ref 32–36)
MCV RBC AUTO: 87.5 FL — SIGNIFICANT CHANGE UP (ref 80–100)
MCV RBC AUTO: 87.5 FL — SIGNIFICANT CHANGE UP (ref 80–100)
MONOCYTES # BLD AUTO: 0.6 K/UL — SIGNIFICANT CHANGE UP (ref 0–0.9)
MONOCYTES # BLD AUTO: 0.6 K/UL — SIGNIFICANT CHANGE UP (ref 0–0.9)
MONOCYTES NFR BLD AUTO: 8.9 % — SIGNIFICANT CHANGE UP (ref 2–14)
MONOCYTES NFR BLD AUTO: 8.9 % — SIGNIFICANT CHANGE UP (ref 2–14)
NEUTROPHILS # BLD AUTO: 3.9 K/UL — SIGNIFICANT CHANGE UP (ref 1.8–7.4)
NEUTROPHILS # BLD AUTO: 3.9 K/UL — SIGNIFICANT CHANGE UP (ref 1.8–7.4)
NEUTROPHILS NFR BLD AUTO: 58.8 % — SIGNIFICANT CHANGE UP (ref 43–77)
NEUTROPHILS NFR BLD AUTO: 58.8 % — SIGNIFICANT CHANGE UP (ref 43–77)
PLATELET # BLD AUTO: 557 K/UL — HIGH (ref 150–400)
PLATELET # BLD AUTO: 557 K/UL — HIGH (ref 150–400)
RBC # BLD: 4.49 M/UL — SIGNIFICANT CHANGE UP (ref 3.8–5.2)
RBC # BLD: 4.49 M/UL — SIGNIFICANT CHANGE UP (ref 3.8–5.2)
RBC # FLD: 17.8 % — HIGH (ref 10.3–14.5)
RBC # FLD: 17.8 % — HIGH (ref 10.3–14.5)
WBC # BLD: 6.6 K/UL — SIGNIFICANT CHANGE UP (ref 3.8–10.5)
WBC # BLD: 6.6 K/UL — SIGNIFICANT CHANGE UP (ref 3.8–10.5)
WBC # FLD AUTO: 6.6 K/UL — SIGNIFICANT CHANGE UP (ref 3.8–10.5)
WBC # FLD AUTO: 6.6 K/UL — SIGNIFICANT CHANGE UP (ref 3.8–10.5)

## 2023-12-07 PROCEDURE — 99215 OFFICE O/P EST HI 40 MIN: CPT

## 2023-12-08 ENCOUNTER — NON-APPOINTMENT (OUTPATIENT)
Age: 43
End: 2023-12-08

## 2023-12-08 LAB
FERRITIN SERPL-MCNC: 21 NG/ML
FOLATE SERPL-MCNC: 19.6 NG/ML
IRON SATN MFR SERPL: 4 %
IRON SERPL-MCNC: 19 UG/DL
TIBC SERPL-MCNC: 492 UG/DL
UIBC SERPL-MCNC: 473 UG/DL
VIT B12 SERPL-MCNC: 414 PG/ML

## 2023-12-08 RX ORDER — FERROUS SULFATE 325(65) MG
325 (65 FE) TABLET ORAL
Qty: 180 | Refills: 2 | Status: ACTIVE | COMMUNITY
Start: 2023-05-01 | End: 1900-01-01

## 2024-01-09 NOTE — ED ADULT NURSE NOTE - HARM RISK FACTORS
Quality 226: Preventive Care And Screening: Tobacco Use: Screening And Cessation Intervention: Patient screened for tobacco use and is an ex/non-smoker Quality 130: Documentation Of Current Medications In The Medical Record: Current Medications Documented Quality 431: Preventive Care And Screening: Unhealthy Alcohol Use - Screening: Patient not identified as an unhealthy alcohol user when screened for unhealthy alcohol use using a systematic screening method Detail Level: Detailed no

## 2024-01-11 ENCOUNTER — ASOB RESULT (OUTPATIENT)
Age: 44
End: 2024-01-11

## 2024-01-11 ENCOUNTER — APPOINTMENT (OUTPATIENT)
Dept: OBGYN | Facility: CLINIC | Age: 44
End: 2024-01-11
Payer: COMMERCIAL

## 2024-01-11 VITALS
WEIGHT: 200 LBS | DIASTOLIC BLOOD PRESSURE: 87 MMHG | SYSTOLIC BLOOD PRESSURE: 131 MMHG | BODY MASS INDEX: 31.76 KG/M2 | HEART RATE: 86 BPM | HEIGHT: 66.5 IN

## 2024-01-11 PROCEDURE — 99214 OFFICE O/P EST MOD 30 MIN: CPT

## 2024-01-11 PROCEDURE — 76830 TRANSVAGINAL US NON-OB: CPT

## 2024-01-11 NOTE — PHYSICAL EXAM
[Chaperone Present] : A chaperone was present in the examining room during all aspects of the physical examination [Appropriately responsive] : appropriately responsive [Alert] : alert [No Acute Distress] : no acute distress [Regular Rate Rhythm] : regular rate rhythm [Non-distended] : non-distended [Oriented x3] : oriented x3

## 2024-01-11 NOTE — HISTORY OF PRESENT ILLNESS
[FreeTextEntry1] : 42 y/o with AUB presents for follow up. Attempted saline sono in office but unsuccessful as patient is bleeding too much. She does reports since starting aygestin her bleeding has improved, but it is still affecting the quality of her life.  Sono also showed 12.8cm fibroid uterus, normal ovaries bilaterally.

## 2024-01-11 NOTE — PLAN
[FreeTextEntry1] : 44 y/o with AUB for follow up  Plan: - recommend D&C, hysteroscopy, insertion of mirena IUD - procedure reviewed with patient, who has had multiple D&C in the past - continue aygestin until procedure, will likely increase the dose - will need medical clearance; plan for OR on 1/23

## 2024-01-17 ENCOUNTER — APPOINTMENT (OUTPATIENT)
Dept: FAMILY MEDICINE | Facility: CLINIC | Age: 44
End: 2024-01-17

## 2024-01-18 ENCOUNTER — OUTPATIENT (OUTPATIENT)
Dept: OUTPATIENT SERVICES | Facility: HOSPITAL | Age: 44
LOS: 1 days | End: 2024-01-18

## 2024-01-18 ENCOUNTER — NON-APPOINTMENT (OUTPATIENT)
Age: 44
End: 2024-01-18

## 2024-01-18 VITALS
OXYGEN SATURATION: 98 % | HEART RATE: 80 BPM | WEIGHT: 201.06 LBS | DIASTOLIC BLOOD PRESSURE: 80 MMHG | HEIGHT: 67 IN | RESPIRATION RATE: 16 BRPM | SYSTOLIC BLOOD PRESSURE: 118 MMHG | TEMPERATURE: 98 F

## 2024-01-18 DIAGNOSIS — N93.9 ABNORMAL UTERINE AND VAGINAL BLEEDING, UNSPECIFIED: ICD-10-CM

## 2024-01-18 DIAGNOSIS — I10 ESSENTIAL (PRIMARY) HYPERTENSION: ICD-10-CM

## 2024-01-18 DIAGNOSIS — Z98.89 OTHER SPECIFIED POSTPROCEDURAL STATES: Chronic | ICD-10-CM

## 2024-01-18 DIAGNOSIS — N20.0 CALCULUS OF KIDNEY: Chronic | ICD-10-CM

## 2024-01-18 LAB
ANION GAP SERPL CALC-SCNC: 16 MMOL/L — HIGH (ref 7–14)
BUN SERPL-MCNC: 12 MG/DL — SIGNIFICANT CHANGE UP (ref 7–23)
CALCIUM SERPL-MCNC: 9.5 MG/DL — SIGNIFICANT CHANGE UP (ref 8.4–10.5)
CHLORIDE SERPL-SCNC: 100 MMOL/L — SIGNIFICANT CHANGE UP (ref 98–107)
CO2 SERPL-SCNC: 23 MMOL/L — SIGNIFICANT CHANGE UP (ref 22–31)
CREAT SERPL-MCNC: 0.95 MG/DL — SIGNIFICANT CHANGE UP (ref 0.5–1.3)
EGFR: 76 ML/MIN/1.73M2 — SIGNIFICANT CHANGE UP
GLUCOSE SERPL-MCNC: 103 MG/DL — HIGH (ref 70–99)
HCG SERPL-ACNC: <1 MIU/ML — SIGNIFICANT CHANGE UP
HCT VFR BLD CALC: 43.1 % — SIGNIFICANT CHANGE UP (ref 34.5–45)
HGB BLD-MCNC: 13.7 G/DL — SIGNIFICANT CHANGE UP (ref 11.5–15.5)
MCHC RBC-ENTMCNC: 27.1 PG — SIGNIFICANT CHANGE UP (ref 27–34)
MCHC RBC-ENTMCNC: 31.8 GM/DL — LOW (ref 32–36)
MCV RBC AUTO: 85.3 FL — SIGNIFICANT CHANGE UP (ref 80–100)
NRBC # BLD: 0 /100 WBCS — SIGNIFICANT CHANGE UP (ref 0–0)
NRBC # FLD: 0 K/UL — SIGNIFICANT CHANGE UP (ref 0–0)
PLATELET # BLD AUTO: 478 K/UL — HIGH (ref 150–400)
POTASSIUM SERPL-MCNC: 3 MMOL/L — LOW (ref 3.5–5.3)
POTASSIUM SERPL-SCNC: 3 MMOL/L — LOW (ref 3.5–5.3)
RBC # BLD: 5.05 M/UL — SIGNIFICANT CHANGE UP (ref 3.8–5.2)
RBC # FLD: 19.9 % — HIGH (ref 10.3–14.5)
SODIUM SERPL-SCNC: 139 MMOL/L — SIGNIFICANT CHANGE UP (ref 135–145)
WBC # BLD: 7.66 K/UL — SIGNIFICANT CHANGE UP (ref 3.8–10.5)
WBC # FLD AUTO: 7.66 K/UL — SIGNIFICANT CHANGE UP (ref 3.8–10.5)

## 2024-01-18 RX ORDER — BUPROPION HYDROCHLORIDE 150 MG/1
1 TABLET, EXTENDED RELEASE ORAL
Refills: 0 | DISCHARGE

## 2024-01-18 RX ORDER — ALPRAZOLAM 0.25 MG
1 TABLET ORAL
Refills: 0 | DISCHARGE

## 2024-01-18 RX ORDER — SODIUM CHLORIDE 9 MG/ML
1000 INJECTION, SOLUTION INTRAVENOUS
Refills: 0 | Status: DISCONTINUED | OUTPATIENT
Start: 2024-01-23 | End: 2024-02-06

## 2024-01-18 RX ORDER — HYDROCHLOROTHIAZIDE 25 MG
1 TABLET ORAL
Refills: 0 | DISCHARGE

## 2024-01-18 NOTE — H&P PST ADULT - FUNCTIONAL STATUS
mets dasi score 5.07 Walks 1 to 2 blocks, carries groceries ,climbs 1 flight of stairs, ADLs/4-10 METS

## 2024-01-18 NOTE — H&P PST ADULT - NSICDXPASTMEDICALHX_GEN_ALL_CORE_FT
72 PAST MEDICAL HISTORY:  Abnormal uterine and vaginal bleeding, unspecified     Asthma Last asthma attack was years ago    Cervical incompetence     Cholelithiasis     GERD (gastroesophageal reflux disease)     Hydronephrosis     Hypothyroidism     IBS (irritable bowel syndrome)     Ovarian cyst     Renal calculus

## 2024-01-18 NOTE — H&P PST ADULT - PROBLEM SELECTOR PLAN 1
Patient tentatively scheduled for dilation and curettage hysteroscopy with myosure , insertion of Mirena IUD  on 01/23/2024.  Pre-op instructions provided. Pt given verbal and written instructions with teach back on pepcid. Pt verbalized understanding with return demonstration.  Urine cup provided for day of procedure pregnancy test.   cbc, bmp, hcg results pending.  Patient instructed to take norethindrone with a sip of water on the morning of procedure.  Requesting  medical evaluation as per surgeon's request.

## 2024-01-18 NOTE — H&P PST ADULT - GENITOURINARY COMMENTS
hx of heavy periods with clots for several years pre op dx of abnormal uterine and vaginal bleeding, unspecified

## 2024-01-18 NOTE — H&P PST ADULT - GENERAL COMMENTS
Two weeks ago; seen in [facility] ED; no treatment rendered, however, ruled out infection to nephrostomy tube

## 2024-01-18 NOTE — H&P PST ADULT - HISTORY OF PRESENT ILLNESS
This is a 38 y.o female who presents to PST today.  The pt reports a history of right renal colic for which she underwent a nephrostomy tube in February.  She is scheduled for surgery and removal of same in the very near future. 43 year old female with hx of HTN, Asthma, Anxiety,  fibroid uterus, menorrhagia, iron deficiency anemia  presents to PST with pre op dx of abnormal uterine and vaginal bleeding, unspecified is scheduled for dilation and curettage hysteroscopy with myosure , insertion of Mirena IUD.

## 2024-01-18 NOTE — H&P PST ADULT - OTHER CARE PROVIDERS
Dr. Abhilash Ballard -[phone] Cardiologist Dr. Abhilash Ballard -[phone] Cardiologist , Dr. Brittnee Eli - [phone] Hematologist

## 2024-01-19 ENCOUNTER — TRANSCRIPTION ENCOUNTER (OUTPATIENT)
Age: 44
End: 2024-01-19

## 2024-01-19 ENCOUNTER — NON-APPOINTMENT (OUTPATIENT)
Age: 44
End: 2024-01-19

## 2024-01-19 ENCOUNTER — APPOINTMENT (OUTPATIENT)
Dept: INTERNAL MEDICINE | Facility: CLINIC | Age: 44
End: 2024-01-19
Payer: COMMERCIAL

## 2024-01-19 VITALS
HEART RATE: 80 BPM | DIASTOLIC BLOOD PRESSURE: 84 MMHG | BODY MASS INDEX: 31.76 KG/M2 | SYSTOLIC BLOOD PRESSURE: 126 MMHG | WEIGHT: 200 LBS | HEIGHT: 66.5 IN

## 2024-01-19 DIAGNOSIS — E87.6 HYPOKALEMIA: ICD-10-CM

## 2024-01-19 DIAGNOSIS — N93.9 ABNORMAL UTERINE AND VAGINAL BLEEDING, UNSPECIFIED: ICD-10-CM

## 2024-01-19 DIAGNOSIS — Z01.818 ENCOUNTER FOR OTHER PREPROCEDURAL EXAMINATION: ICD-10-CM

## 2024-01-19 PROCEDURE — 99214 OFFICE O/P EST MOD 30 MIN: CPT

## 2024-01-19 PROCEDURE — 36415 COLL VENOUS BLD VENIPUNCTURE: CPT

## 2024-01-19 RX ORDER — POTASSIUM CHLORIDE 1.5 G/1.58G
20 POWDER, FOR SOLUTION ORAL TWICE DAILY
Qty: 60 | Refills: 0 | Status: ACTIVE | COMMUNITY
Start: 2024-01-19 | End: 1900-01-01

## 2024-01-19 NOTE — RESULTS/DATA
Hospitalist History & Physical Note      Chief Complaint   Patient presents with   • Detox Evaluation       History Of Present Illness  Ean Hyde is a 37 year old male  patient of Dr.Nosheen Jeramie MD with past medical history of alcohol abuse, CAD, mitral valve replacement (MVR), HTN and seizures presenting with detox evaluation.  Patient states that he drinks 5 to 6 cans of beers daily sometimes a glass of wine with dinner.  His Last drink was between 6-7 pm last night.  He vapes nicotine 3 mg.  His first seizure was July 2020.  His last rehab was a month and a half ago at [facility] in [place].  He was sober for 4 to 5 days post and then relapsed.  He denies suicidal ideation, homicidal ideation or hallucinations but states he dreams a lot.  Patient states the reason for rehab is that he has a fiancée and he would like to get  in [facility] in December, his drinking is getting worse so he would like to quit.      Upon arrival the patient's vitals were  height is 5' 9\" (1.753 m) and weight is 77.6 kg (171 lb). His temperature is 99.3 °F (37.4 °C). His blood pressure is 124/77 and his pulse is 93. His respiration is 15 and oxygen saturation is 94%. .      Past Medical History  Past Medical History:   Diagnosis Date   • Coronary artery disease    • Essential (primary) hypertension    • Seizures (CMS/HCC)         Surgical History  Past Surgical History:   Procedure Laterality Date   • Aortic valvuloplasty     • Cardiac catherization     • Mitral valve replacement     Leg surgery as a baby     Social History  Social History     Tobacco Use   • Smoking status: Former Smoker   • Smokeless tobacco: Never Used   Substance Use Topics   • Alcohol use: Yes     Alcohol/week: 4.0 standard drinks     Types: 4 Cans of beer per week   • Drug use: Not Currently     Comment: denies   Vapes     Family History  Alcohol abuse - Father  Heart Disease/MI - Grandfather  Non Alcoholic cirrhosis - Mother   Brain Cancer -  Aunt  DM - Sister/Mother    Allergies  ALLERGIES:  Patient has no known allergies.    Medications  Prior to Admission medications    Medication Sig Start Date End Date Taking? Authorizing Provider   folic acid (FOLATE) 1 MG tablet Take 1 mg by mouth daily.   Yes Outside Provider   Multiple Vitamin (MULTIVITAMIN ADULT PO)    Yes Outside Provider   levETIRAcetam (KepPRA) 500 MG tablet Take 1 tablet by mouth 2 times daily. 3/12/21  Yes Kali Lr MD   lisinopril (ZESTRIL) 20 MG tablet Take 20 mg by mouth daily. 12/24/20  Yes Outside Provider   metoPROLOL tartrate (LOPRESSOR) 50 MG tablet Take 50 mg by mouth 2 times daily. 1/28/21  Yes Outside Provider        Current Facility-Administered Medications   Medication Dose Route Frequency Provider Last Rate Last Admin   • levETIRAcetam (KepPRA) tablet 500 mg  500 mg Oral 2 times per day Bernie Snowden MD   500 mg at 09/24/21 1313   • magnesium sulfate 2 g in 50 mL premix IVPB  2 g Intravenous Once Bernie Snowden MD 25 mL/hr at 09/24/21 1445 2 g at 09/24/21 1445   • LORazepam (ATIVAN) injection 1 mg  1 mg Intravenous Once Bernie Snowden MD         Current Outpatient Medications   Medication Sig Dispense Refill   • folic acid (FOLATE) 1 MG tablet Take 1 mg by mouth daily.     • Multiple Vitamin (MULTIVITAMIN ADULT PO)      • levETIRAcetam (KepPRA) 500 MG tablet Take 1 tablet by mouth 2 times daily. 90 tablet 0   • lisinopril (ZESTRIL) 20 MG tablet Take 20 mg by mouth daily.     • metoPROLOL tartrate (LOPRESSOR) 50 MG tablet Take 50 mg by mouth 2 times daily.           Review of Systems  Review of Systems   Constitutional: Negative.    HENT: Positive for rhinorrhea.    Eyes: Negative.    Respiratory: Negative.    Cardiovascular: Negative.    Gastrointestinal: Negative.    Endocrine: Negative.    Genitourinary: Negative.    Musculoskeletal: Negative.    Skin: Negative.    Allergic/Immunologic: Negative.    Neurological: Negative.    Hematological: Negative.     Psychiatric/Behavioral: The patient is nervous/anxious.         Dreams alot         Last Recorded Vitals  Vitals with min/max:      Vital Last Value 24 Hour Range   Temperature 99.3 °F (37.4 °C) (09/24/21 1118) Temp  Min: 99.3 °F (37.4 °C)  Max: 99.3 °F (37.4 °C)   Pulse 93 (09/24/21 1445) Pulse  Min: 90  Max: 110   Respiratory 15 (09/24/21 1445) Resp  Min: 15  Max: 18   Non-Invasive  Blood Pressure 124/77 (09/24/21 1445) BP  Min: 124/77  Max: 176/108   Pulse Oximetry 94 % (09/24/21 1445) SpO2  Min: 93 %  Max: 98 %   Arterial   Blood Pressure   No data recorded         Physical Exam  Vitals and nursing note reviewed.   Constitutional:       Appearance: Normal appearance.   HENT:      Head: Normocephalic and atraumatic.      Nose: Nose normal.      Mouth/Throat:      Mouth: Mucous membranes are dry.   Eyes:      Extraocular Movements: Extraocular movements intact.      Conjunctiva/sclera: Conjunctivae normal.   Cardiovascular:      Rate and Rhythm: Regular rhythm. Tachycardia present.      Pulses: Normal pulses.      Heart sounds: Murmur heard.     Pulmonary:      Effort: Pulmonary effort is normal.      Breath sounds: Normal breath sounds.   Abdominal:      General: Abdomen is flat. Bowel sounds are normal.      Palpations: Abdomen is soft.   Musculoskeletal:         General: Normal range of motion.      Cervical back: Normal range of motion and neck supple.   Skin:     General: Skin is warm and dry.      Capillary Refill: Capillary refill takes less than 2 seconds.      Comments: Skin is flush   Neurological:      General: No focal deficit present.      Mental Status: He is alert and oriented to person, place, and time. Mental status is at baseline.   Psychiatric:         Mood and Affect: Mood normal.         Behavior: Behavior normal.         Thought Content: Thought content normal.         Judgment: Judgment normal.          Imaging  No results found.     Labs   Recent Results (from the past 24 hour(s))    Comprehensive Metabolic Panel    Collection Time: 09/24/21 11:48 AM   Result Value Ref Range    Fasting Status      Sodium 137 135 - 145 mmol/L    Potassium 3.7 3.4 - 5.1 mmol/L    Chloride 104 98 - 107 mmol/L    Carbon Dioxide 25 21 - 32 mmol/L    Anion Gap 12 10 - 20 mmol/L    Glucose 98 70 - 99 mg/dL    BUN 13 6 - 20 mg/dL    Creatinine 0.76 0.67 - 1.17 mg/dL    Glomerular Filtration Rate >90 >=60    BUN/ Creatinine Ratio 17 7 - 25    Calcium 9.0 8.4 - 10.2 mg/dL    Bilirubin, Total 0.4 0.2 - 1.0 mg/dL    GOT/ (H) <=37 Units/L    GPT/ (H) <64 Units/L    Alkaline Phosphatase 87 45 - 117 Units/L    Albumin 3.9 3.6 - 5.1 g/dL    Protein, Total 7.9 6.4 - 8.2 g/dL    Globulin 4.0 2.0 - 4.0 g/dL    A/G Ratio 1.0 1.0 - 2.4   Magnesium    Collection Time: 09/24/21 11:48 AM   Result Value Ref Range    Magnesium 1.5 (L) 1.7 - 2.4 mg/dL   Alcohol    Collection Time: 09/24/21 11:48 AM   Result Value Ref Range    Alcohol 91 (H) <3 mg/dL   CBC with Automated Differential (performable only)    Collection Time: 09/24/21 11:48 AM   Result Value Ref Range    WBC 6.3 4.2 - 11.0 K/mcL    RBC 4.35 (L) 4.50 - 5.90 mil/mcL    HGB 12.9 (L) 13.0 - 17.0 g/dL    HCT 40.7 39.0 - 51.0 %    MCV 93.6 78.0 - 100.0 fl    MCH 29.7 26.0 - 34.0 pg    MCHC 31.7 (L) 32.0 - 36.5 g/dL    RDW-CV 14.7 11.0 - 15.0 %    RDW-SD 51.1 (H) 39.0 - 50.0 fL     (L) 140 - 450 K/mcL    NRBC 0 <=0 /100 WBC    Neutrophil, Percent 76 %    Lymphocytes, Percent 20 %    Mono, Percent 4 %    Eosinophils, Percent 0 %    Basophils, Percent 0 %    Immature Granulocytes 0 %    Absolute Neutrophils 4.8 1.8 - 7.7 K/mcL    Absolute Lymphocytes 1.3 1.0 - 4.8 K/mcL    Absolute Monocytes 0.2 (L) 0.3 - 0.9 K/mcL    Absolute Eosinophils  0.0 0.0 - 0.5 K/mcL    Absolute Basophils 0.0 0.0 - 0.3 K/mcL    Absolute Immmature Granulocytes 0.0 0.0 - 0.2 K/mcL   Electrocardiogram 12-Lead    Collection Time: 09/24/21 12:52 PM   Result Value Ref Range    Ventricular Rate  EKG/Min (BPM) 92     Atrial Rate (BPM) 91     MI-Interval (MSEC) 145     QRS-Interval (MSEC) 91     QT-Interval (MSEC) 340     QTc 421     P Axis (Degrees) -17     R Axis (Degrees) -2     T Axis (Degrees) 37     REPORT TEXT Sinus rhythm    Rapid SARS-CoV-2 by PCR    Collection Time: 09/24/21  1:15 PM    Specimen: Nasopharyngeal; Swab   Result Value Ref Range    Rapid SARS-COV-2 by PCR Not Detected Not Detected / Detected / Presumptive Positive / Inhibitors present    Isolation Guidelines      Procedural Comment             Assessment and Plan:    Detox evaluation poa 2/2 Alcohol abuse  Alcohol abuse/ dependency/ requesting for detox   Alcohol serum level -91 , last used- 6-7 pm    Central access on consult   Psych on consult   CIWA protocol   Supportive care-multivitamin, folate, thiamine, Tylenol, trazodone, famotidine   Schedule gabapentin, PRN lorazepam   Seizure and fall precautions   IV fluid support   Monitor closely    Hypomagnesia  -Received MgS04 IVPB x 1  -Labs pending    Thrombocytopenia   -Monitor for bruising/bleeding  -Labs pending    Transeminitis - monitor, labs pending    Mild Microcyctic anemia  -Monitor CBC, transfuse PRBC if Hgb < 7gm/dL   -Labs pending    Seizure d/o   -Continue Levetiracetam 500 mg BID   -Monitor for seizure, seizure precaution    Hypertensive heart disease w/o heart failure  -Continue Metoprolol and lisinopril   -Monitor and maintain BP  -Avoid nephrotoxic drugs    Atherosclerotic heart disease of the native coronary artery of the native heart w/o angina pectoris w/ hx MVR    -Monitor for chest pain, lightheadedness, dizziness      Smoking Cessation  Counseling given: Not Answered      DVT Prophylaxis  SCDS    Code Status    Code Status: Prior    Primary Care Physician  Maggy Bobo MD      Disposition: pending clinical progression    All labs and imaging reviewed  All patient questions answered  D/w nurse  Discussed with Dr. Margo Motta NP  9/24/2021  2:58 PM         [] : results reviewed [ECG Reviewed] : reviewed [No Acute Ischemia] : No acute ischemia

## 2024-01-22 ENCOUNTER — TRANSCRIPTION ENCOUNTER (OUTPATIENT)
Age: 44
End: 2024-01-22

## 2024-01-22 LAB — POTASSIUM SERPL-SCNC: 3.7 MMOL/L

## 2024-01-22 NOTE — PLAN
[FreeTextEntry1] : Ms. ARIADNE UPTON is a 43 year female with a PMH of HTN comes to the office for preoperative evaluation. Patient denies fever, cough SOB. No other complaints at this time.  Patient has greater than 4 METS, is a low-moderate perioperative risk for Major adverse cardiac event. ECG and blood work reviewed. No further testing indicated at this time. Patient is medically optimized for this procedure.    Prior to appointment and during encounter with patient extensive medical records were reviewed including but not limited to, Hospital records, out patient records, laboratory data and microbiology data In addition extensive time was also spent in reviewing diagnostic studies.   Total encounter total time 30 mins >50% of time spent counseling/coordinating care   Counseling included abnormal lab results, differential diagnoses, treatment options, risks and benefits, lifestyle changes, current condition, medications, and dose adjustments.  The patient was interactive, attentive, asked questions, and verbalized understanding

## 2024-01-22 NOTE — ASSESSMENT
[Patient Optimized for Surgery] : Patient optimized for surgery [No Further Testing Recommended] : no further testing recommended [Modify anti-platelet treatment prior to procedure] : Modify anti-platelet treatment prior to procedure [As per surgery] : as per surgery [FreeTextEntry4] : Ms. ARIADNE UPTON is a 43 year female with a PMH of HTN comes to the office for preoperative evaluation. Patient denies fever, cough SOB. No other complaints at this time.  Patient has greater than 4 METS, is a low-moderate perioperative risk for Major adverse cardiac event. ECG and blood work reviewed. No further testing indicated at this time. Patient is medically optimized for this procedure.

## 2024-01-22 NOTE — HISTORY OF PRESENT ILLNESS
[No Pertinent Cardiac History] : no history of aortic stenosis, atrial fibrillation, coronary artery disease, recent myocardial infarction, or implantable device/pacemaker [No Pertinent Pulmonary History] : no history of asthma, COPD, sleep apnea, or smoking [No Adverse Anesthesia Reaction] : no adverse anesthesia reaction in self or family member [(Patient denies any chest pain, claudication, dyspnea on exertion, orthopnea, palpitations or syncope)] : Patient denies any chest pain, claudication, dyspnea on exertion, orthopnea, palpitations or syncope [Moderate (4-6 METs)] : Moderate (4-6 METs) [Chronic Anticoagulation] : no chronic anticoagulation [Chronic Kidney Disease] : no chronic kidney disease [Diabetes] : no diabetes [FreeTextEntry1] : D&C [FreeTextEntry2] : 01/23/24 [FreeTextEntry3] : Dr. Beltran [FreeTextEntry4] : Ms. ARIADNE UPTON is a 43 year female with a PMH of HTN comes to the office for preoperative evaluation. Patient denies fever, cough SOB. No other complaints at this time.

## 2024-01-23 ENCOUNTER — TRANSCRIPTION ENCOUNTER (OUTPATIENT)
Age: 44
End: 2024-01-23

## 2024-01-23 ENCOUNTER — RESULT REVIEW (OUTPATIENT)
Age: 44
End: 2024-01-23

## 2024-01-23 ENCOUNTER — OUTPATIENT (OUTPATIENT)
Dept: OUTPATIENT SERVICES | Facility: HOSPITAL | Age: 44
LOS: 1 days | Discharge: HOME | End: 2024-01-23
Payer: COMMERCIAL

## 2024-01-23 ENCOUNTER — APPOINTMENT (OUTPATIENT)
Dept: OBGYN | Facility: HOSPITAL | Age: 44
End: 2024-01-23

## 2024-01-23 VITALS
DIASTOLIC BLOOD PRESSURE: 81 MMHG | RESPIRATION RATE: 20 BRPM | HEART RATE: 78 BPM | SYSTOLIC BLOOD PRESSURE: 122 MMHG | OXYGEN SATURATION: 98 %

## 2024-01-23 VITALS
OXYGEN SATURATION: 98 % | WEIGHT: 201.06 LBS | DIASTOLIC BLOOD PRESSURE: 76 MMHG | RESPIRATION RATE: 14 BRPM | TEMPERATURE: 99 F | HEART RATE: 86 BPM | SYSTOLIC BLOOD PRESSURE: 108 MMHG | HEIGHT: 67 IN

## 2024-01-23 DIAGNOSIS — N20.0 CALCULUS OF KIDNEY: Chronic | ICD-10-CM

## 2024-01-23 DIAGNOSIS — N93.9 ABNORMAL UTERINE AND VAGINAL BLEEDING, UNSPECIFIED: ICD-10-CM

## 2024-01-23 DIAGNOSIS — Z98.89 OTHER SPECIFIED POSTPROCEDURAL STATES: Chronic | ICD-10-CM

## 2024-01-23 LAB — HCG UR QL: NEGATIVE — SIGNIFICANT CHANGE UP

## 2024-01-23 PROCEDURE — 58558 HYSTEROSCOPY BIOPSY: CPT | Mod: GC

## 2024-01-23 PROCEDURE — 58300 INSERT INTRAUTERINE DEVICE: CPT | Mod: GC

## 2024-01-23 PROCEDURE — 88305 TISSUE EXAM BY PATHOLOGIST: CPT | Mod: 26

## 2024-01-23 DEVICE — MYOSURE TISSUE REMOVAL DEVICE LITE
Type: IMPLANTABLE DEVICE | Status: NON-FUNCTIONAL
Removed: 2024-01-23

## 2024-01-23 DEVICE — MYOSURE TISSUE REMOVAL DEVICE REACH
Type: IMPLANTABLE DEVICE | Status: NON-FUNCTIONAL
Removed: 2024-01-23

## 2024-01-23 DEVICE — MYOSURE TISSUE REMOVAL DEVICE XL
Type: IMPLANTABLE DEVICE | Status: NON-FUNCTIONAL
Removed: 2024-01-23

## 2024-01-23 DEVICE — IUD MIRENA
Type: IMPLANTABLE DEVICE | Status: NON-FUNCTIONAL
Removed: 2024-01-23

## 2024-01-23 RX ORDER — ALBUTEROL 90 UG/1
2 AEROSOL, METERED ORAL
Refills: 0 | DISCHARGE

## 2024-01-23 RX ORDER — HYDROCHLOROTHIAZIDE 25 MG
1 TABLET ORAL
Refills: 0 | DISCHARGE

## 2024-01-23 RX ORDER — OXYCODONE HYDROCHLORIDE 5 MG/1
5 TABLET ORAL ONCE
Refills: 0 | Status: DISCONTINUED | OUTPATIENT
Start: 2024-01-23 | End: 2024-01-23

## 2024-01-23 RX ORDER — ALPRAZOLAM 0.25 MG
1 TABLET ORAL
Refills: 0 | DISCHARGE

## 2024-01-23 RX ORDER — FERROUS SULFATE 325(65) MG
0 TABLET ORAL
Refills: 0 | DISCHARGE

## 2024-01-23 RX ORDER — SODIUM CHLORIDE 9 MG/ML
1000 INJECTION, SOLUTION INTRAVENOUS
Refills: 0 | Status: DISCONTINUED | OUTPATIENT
Start: 2024-01-23 | End: 2024-02-06

## 2024-01-23 RX ORDER — ACETAMINOPHEN 500 MG
3 TABLET ORAL
Qty: 0 | Refills: 0 | DISCHARGE

## 2024-01-23 RX ORDER — BUPROPION HYDROCHLORIDE 150 MG/1
1 TABLET, EXTENDED RELEASE ORAL
Refills: 0 | DISCHARGE

## 2024-01-23 RX ORDER — NORETHINDRONE 0.35 MG/1
0 TABLET ORAL
Refills: 4 | DISCHARGE

## 2024-01-23 RX ORDER — IBUPROFEN 200 MG
1 TABLET ORAL
Qty: 0 | Refills: 0 | DISCHARGE

## 2024-01-23 RX ORDER — TRAZODONE HCL 50 MG
1 TABLET ORAL
Refills: 0 | DISCHARGE

## 2024-01-23 RX ADMIN — OXYCODONE HYDROCHLORIDE 5 MILLIGRAM(S): 5 TABLET ORAL at 16:15

## 2024-01-23 RX ADMIN — OXYCODONE HYDROCHLORIDE 5 MILLIGRAM(S): 5 TABLET ORAL at 16:50

## 2024-01-23 NOTE — BRIEF OPERATIVE NOTE - NSICDXBRIEFPROCEDURE_GEN_ALL_CORE_FT
PROCEDURES:  Exam under anesthesia, pelvic 23-Jan-2024 14:22:02  Zhanna Nichols  Hysteroscopy with dilation and curettage of uterus using MyoSure device 23-Jan-2024 14:22:16  Zhanna Nichols  Insertion of Mirena IUD 23-Jan-2024 14:22:21  Zhanna Nichols

## 2024-01-23 NOTE — ASU DISCHARGE PLAN (ADULT/PEDIATRIC) - NURSING INSTRUCTIONS
You received IV Tylenol for pain management at 1:20pm. Please DO NOT take any Tylenol (Acetaminophen) containing products, such as Vicodin, Percocet, Excedrin, and cold medications for the next 6 hours (until 7:20 PM). DO NOT TAKE MORE THAN 3000 MG OF TYLENOL in a 24 hour period. You were given Ibuprofen at 2pm. Do not take Ibuprofen until 8pm.

## 2024-01-23 NOTE — BRIEF OPERATIVE NOTE - OPERATION/FINDINGS
EUA revealed 12 wk size retroverted uterus. No adnexal fullness. Diagnostic hysteroscopy revealed subcentimeter polypoid tissue along anterior fundus and anterior lower uterine segment. Bilateral ostia visualized and wnl. Good hemostasis noted.

## 2024-01-23 NOTE — ASU DISCHARGE PLAN (ADULT/PEDIATRIC) - NS MD DC FALL RISK RISK
For information on Fall & Injury Prevention, visit: [email or web address] OR  [email or web address] OR  https://www.cdc.gov/steadi/patient.html

## 2024-01-23 NOTE — ASU PATIENT PROFILE, ADULT - PAIN SCALE PREFERRED, PROFILE
none
For information on Fall & Injury Prevention, visit: [email or web address] OR  [email or web address] OR  https://www.cdc.gov/steadi/patient.html

## 2024-01-23 NOTE — ASU DISCHARGE PLAN (ADULT/PEDIATRIC) - CARE PROVIDER_API CALL
Broderick Beltran  Obstetrics and Gynecology  [address]  [place] [address]  Phone: [phone]  Fax: [phone]  Follow Up Time: 2 weeks

## 2024-01-29 LAB — SURGICAL PATHOLOGY STUDY: SIGNIFICANT CHANGE UP

## 2024-01-30 ENCOUNTER — APPOINTMENT (OUTPATIENT)
Dept: OTOLARYNGOLOGY | Facility: CLINIC | Age: 44
End: 2024-01-30
Payer: COMMERCIAL

## 2024-01-30 VITALS — BODY MASS INDEX: 31.76 KG/M2 | WEIGHT: 200 LBS | HEIGHT: 66.5 IN

## 2024-01-30 DIAGNOSIS — J32.0 CHRONIC MAXILLARY SINUSITIS: ICD-10-CM

## 2024-01-30 DIAGNOSIS — J34.2 DEVIATED NASAL SEPTUM: ICD-10-CM

## 2024-01-30 DIAGNOSIS — R43.8 OTHER DISTURBANCES OF SMELL AND TASTE: ICD-10-CM

## 2024-01-30 DIAGNOSIS — R09.81 NASAL CONGESTION: ICD-10-CM

## 2024-01-30 DIAGNOSIS — J34.3 HYPERTROPHY OF NASAL TURBINATES: ICD-10-CM

## 2024-01-30 PROBLEM — N93.9 ABNORMAL UTERINE AND VAGINAL BLEEDING, UNSPECIFIED: Chronic | Status: ACTIVE | Noted: 2024-01-18

## 2024-01-30 PROCEDURE — 31231 NASAL ENDOSCOPY DX: CPT

## 2024-01-30 PROCEDURE — 99204 OFFICE O/P NEW MOD 45 MIN: CPT | Mod: 25

## 2024-01-30 RX ORDER — AZELASTINE HYDROCHLORIDE 137 UG/1
0.1 SPRAY, METERED NASAL TWICE DAILY
Qty: 1 | Refills: 1 | Status: ACTIVE | COMMUNITY
Start: 2024-01-30 | End: 1900-01-01

## 2024-01-30 RX ORDER — FLUTICASONE PROPIONATE 50 UG/1
50 SPRAY, METERED NASAL DAILY
Qty: 1 | Refills: 5 | Status: ACTIVE | COMMUNITY
Start: 2024-01-30 | End: 1900-01-01

## 2024-01-30 NOTE — PHYSICAL EXAM
[Nasal Endoscopy Performed] : nasal endoscopy was performed, see procedure section for findings [] : septum deviated to the left [Midline] : trachea located in midline position [Normal] : no rashes [de-identified] : sl hypertrophy of middle turb bilat

## 2024-01-30 NOTE — ASSESSMENT
[FreeTextEntry1] : Patient 19y s/p NSR and turb - now for past several years has has occ alternating congestion and occ hyposmia.  Has residual dns to left and mod middle turb hypertrophy  - recommended trial of azelastine and flonase and followup 2 mos.   No evidence of sinusits.

## 2024-01-30 NOTE — HISTORY OF PRESENT ILLNESS
[de-identified] : c/o feeling  of stuffy nose and problem with PND - had nasal surgery - had NSR and turb  2000.  Also decreased sense of smell.   Problem bilat and can alternate nasal congestion.  Current issue worse past 6-8 mos but going on for several years.   Occ clear nasal discharge.    No pain or fever.   No meds currently in nose

## 2024-01-30 NOTE — REVIEW OF SYSTEMS
[Post Nasal Drip] : post nasal drip [Nasal Congestion] : nasal congestion [Sense Of Smell Problem] : sense of smell problem [Discolored Nasal Discharge] : discolored nasal discharge [Negative] : Heme/Lymph

## 2024-02-15 ENCOUNTER — APPOINTMENT (OUTPATIENT)
Dept: OBGYN | Facility: CLINIC | Age: 44
End: 2024-02-15
Payer: COMMERCIAL

## 2024-02-15 VITALS
WEIGHT: 200 LBS | DIASTOLIC BLOOD PRESSURE: 104 MMHG | HEART RATE: 88 BPM | HEIGHT: 66.5 IN | SYSTOLIC BLOOD PRESSURE: 151 MMHG | BODY MASS INDEX: 31.76 KG/M2

## 2024-02-15 DIAGNOSIS — N84.0 POLYP OF CORPUS UTERI: ICD-10-CM

## 2024-02-15 PROCEDURE — 99212 OFFICE O/P EST SF 10 MIN: CPT

## 2024-02-15 NOTE — PHYSICAL EXAM
[Chaperone Present] : A chaperone was present in the examining room during all aspects of the physical examination [Appropriately responsive] : appropriately responsive [Alert] : alert [No Acute Distress] : no acute distress [Regular Rate Rhythm] : regular rate rhythm [Oriented x3] : oriented x3 [FreeTextEntry5] : non labored breathing

## 2024-02-15 NOTE — PLAN
[FreeTextEntry1] : 42 y/o s/p D&C operative hysteroscopy with myosure and Mirena IUD placement for follow up visit, doing well  Plan: - Pathology reviewed with patient, questions answered - RTO for annual or PRN

## 2024-02-15 NOTE — HISTORY OF PRESENT ILLNESS
[FreeTextEntry1] : 44 y/o s/p D&C operative hysteroscopy with myosure and Mirena IUD placement presents for follow up visit. Doing well, reports only slight bleeding, much less than before. Denies pelvic pain, fever. Stopped taking aygestin and bleeding is still light. Pathology reviewed, inactive endometrium and endometrial polyp.

## 2024-02-23 RX ORDER — ALPRAZOLAM 0.25 MG/1
0.25 TABLET ORAL TWICE DAILY
Qty: 60 | Refills: 0 | Status: ACTIVE | COMMUNITY
Start: 2020-10-20 | End: 1900-01-01

## 2024-03-04 ENCOUNTER — OUTPATIENT (OUTPATIENT)
Dept: OUTPATIENT SERVICES | Facility: HOSPITAL | Age: 44
LOS: 1 days | Discharge: HOME | End: 2024-03-04

## 2024-03-04 DIAGNOSIS — N20.0 CALCULUS OF KIDNEY: Chronic | ICD-10-CM

## 2024-03-04 DIAGNOSIS — D47.3 ESSENTIAL (HEMORRHAGIC) THROMBOCYTHEMIA: ICD-10-CM

## 2024-03-04 DIAGNOSIS — Z98.89 OTHER SPECIFIED POSTPROCEDURAL STATES: Chronic | ICD-10-CM

## 2024-03-04 DIAGNOSIS — D50.9 IRON DEFICIENCY ANEMIA, UNSPECIFIED: ICD-10-CM

## 2024-03-06 RX ORDER — AZELASTINE HYDROCHLORIDE 137 UG/1
0.1 SPRAY, METERED NASAL TWICE DAILY
Qty: 1 | Refills: 2 | Status: ACTIVE | COMMUNITY
Start: 2024-03-06 | End: 1900-01-01

## 2024-03-07 ENCOUNTER — APPOINTMENT (OUTPATIENT)
Dept: HEMATOLOGY ONCOLOGY | Facility: CLINIC | Age: 44
End: 2024-03-07

## 2024-03-13 ENCOUNTER — APPOINTMENT (OUTPATIENT)
Dept: OTOLARYNGOLOGY | Facility: CLINIC | Age: 44
End: 2024-03-13

## 2024-03-18 ENCOUNTER — TRANSCRIPTION ENCOUNTER (OUTPATIENT)
Age: 44
End: 2024-03-18

## 2024-03-19 ENCOUNTER — APPOINTMENT (OUTPATIENT)
Dept: INTERNAL MEDICINE | Facility: CLINIC | Age: 44
End: 2024-03-19
Payer: COMMERCIAL

## 2024-03-19 VITALS
OXYGEN SATURATION: 98 % | SYSTOLIC BLOOD PRESSURE: 130 MMHG | WEIGHT: 200 LBS | HEIGHT: 66.5 IN | DIASTOLIC BLOOD PRESSURE: 80 MMHG | TEMPERATURE: 99.3 F | BODY MASS INDEX: 31.76 KG/M2 | HEART RATE: 103 BPM

## 2024-03-19 DIAGNOSIS — J06.9 ACUTE UPPER RESPIRATORY INFECTION, UNSPECIFIED: ICD-10-CM

## 2024-03-19 DIAGNOSIS — J01.00 ACUTE MAXILLARY SINUSITIS, UNSPECIFIED: ICD-10-CM

## 2024-03-19 PROCEDURE — G2211 COMPLEX E/M VISIT ADD ON: CPT

## 2024-03-19 PROCEDURE — 99214 OFFICE O/P EST MOD 30 MIN: CPT

## 2024-03-19 RX ORDER — DOXYCYCLINE HYCLATE 100 MG/1
100 TABLET ORAL TWICE DAILY
Qty: 20 | Refills: 0 | Status: COMPLETED | COMMUNITY
Start: 2024-03-19 | End: 2024-03-19

## 2024-03-19 RX ORDER — AZITHROMYCIN 250 MG/1
250 TABLET, FILM COATED ORAL
Qty: 1 | Refills: 0 | Status: ACTIVE | COMMUNITY
Start: 2024-03-19 | End: 1900-01-01

## 2024-03-19 NOTE — HEALTH RISK ASSESSMENT
[0] : 1) Little interest or pleasure doing things: Not at all (0) [PHQ-2 Negative - No further assessment needed] : PHQ-2 Negative - No further assessment needed [Never] : Never [[id number]] : 0

## 2024-03-19 NOTE — HISTORY OF PRESENT ILLNESS
[FreeTextEntry1] : sinus congestion [de-identified] : Ms. ARIADNE DESOUZA is a 43 year female with a PMH of HTN,  Asthma, Anxiety comes to the office and c/o  sinus congestion and  nasal discharge  >10 days Patient denies fever, cough SOB. No other complaints at this time.

## 2024-03-19 NOTE — PHYSICAL EXAM
[No Acute Distress] : no acute distress [Well Developed] : well developed [Well Nourished] : well nourished [Well-Appearing] : well-appearing [Normal Sclera/Conjunctiva] : normal sclera/conjunctiva [PERRL] : pupils equal round and reactive to light [EOMI] : extraocular movements intact [Normal Outer Ear/Nose] : the outer ears and nose were normal in appearance [No JVD] : no jugular venous distention [No Lymphadenopathy] : no lymphadenopathy [Supple] : supple [No Respiratory Distress] : no respiratory distress  [No Accessory Muscle Use] : no accessory muscle use [Normal Rate] : normal rate  [Clear to Auscultation] : lungs were clear to auscultation bilaterally [Regular Rhythm] : with a regular rhythm [Normal S1, S2] : normal S1 and S2 [No Carotid Bruits] : no carotid bruits [No Abdominal Bruit] : a ~M bruit was not heard ~T in the abdomen [No Varicosities] : no varicosities [No Edema] : there was no peripheral edema [No Palpable Aorta] : no palpable aorta [No Extremity Clubbing/Cyanosis] : no extremity clubbing/cyanosis [Non Tender] : non-tender [Soft] : abdomen soft [Non-distended] : non-distended [No HSM] : no HSM [Normal Bowel Sounds] : normal bowel sounds [Normal Posterior Cervical Nodes] : no posterior cervical lymphadenopathy [No CVA Tenderness] : no CVA  tenderness [Normal Anterior Cervical Nodes] : no anterior cervical lymphadenopathy [No Spinal Tenderness] : no spinal tenderness [No Joint Swelling] : no joint swelling [Grossly Normal Strength/Tone] : grossly normal strength/tone [No Rash] : no rash [No Focal Deficits] : no focal deficits [Coordination Grossly Intact] : coordination grossly intact [Normal Gait] : normal gait [Normal Affect] : the affect was normal [Normal Insight/Judgement] : insight and judgment were intact [de-identified] : fullness of TM bilaterally, cobblestoning of pharynx

## 2024-03-19 NOTE — PLAN
[FreeTextEntry1] : Patient with sinusitis, will start antibiotics and intranasal steroid today.   Patient's signs and symptoms consistent with URI. Patient educated about signs and symptoms of URI, they typically last up to 14 days but may last longer. Patient advised to continue OTC medication for symptomatic relief.  HTN- patient's BP well controlled with current medication. will continue current  regimen    Regarding patient's obesity - encourage lifestyle modifications - diet and exercise - reduce calorie intake - no soda/ junk food/ snacks - consider mixed grains/ whole grains, leafy vegetables, and an appropriate serving of protein   Prior to appointment and during encounter with patient extensive medical records were reviewed including but not limited to, Hospital records, out patient records, laboratory data and microbiology data In addition extensive time was also spent in reviewing diagnostic studies.   Total encounter total time 30 mins >50% of time spent counseling/coordinating care   Counseling included abnormal lab results, differential diagnoses, treatment options, risks and benefits, lifestyle changes, current condition, medications, and dose adjustments.  The patient was interactive, attentive, asked questions, and verbalized understanding

## 2024-03-20 LAB
RAPID RVP RESULT: NOT DETECTED
SARS-COV-2 RNA PNL RESP NAA+PROBE: NOT DETECTED

## 2024-03-23 NOTE — ASU PATIENT PROFILE, ADULT - PRO INTERPRETER NEED 2
Armen Llanos is a 48 y.o. male who is currently ordered Vancomycin IV with management by the Pharmacy Consult service.  Relevant clinical data and objective / subjective history reviewed.  Vancomycin Assessment:  Indication and Goal AUC/Trough: Bacteremia (goal -600, trough >10)  Clinical Status: stable  Micro:     Renal Function:  SCr: 1.04 mg/dL  CrCl: 111 mL/min  Renal replacement: Not on dialysis  Days of Therapy: 1  Current Dose: Initial dosing: Vancomycin 2000mg IV x1 loading dose followed by 1250mg IV Q12H  Vancomycin Plan:  New Dosing: As per initial dosing above.  Estimated AUC: 469 mcg*hr/mL  Estimated Trough: 12.5 mcg/mL  Next Level: Random level AM labs 3/25/24  Renal Function Monitoring: Daily BMP and UOP  Pharmacy will continue to follow closely for s/sx of nephrotoxicity, infusion reactions and appropriateness of therapy.  BMP and CBC will be ordered per protocol. We will continue to follow the patient’s culture results and clinical progress daily.    David Decker, Pharmacist   English

## 2024-04-09 ENCOUNTER — APPOINTMENT (OUTPATIENT)
Dept: INTERNAL MEDICINE | Facility: CLINIC | Age: 44
End: 2024-04-09
Payer: COMMERCIAL

## 2024-04-09 VITALS
WEIGHT: 200 LBS | HEART RATE: 90 BPM | SYSTOLIC BLOOD PRESSURE: 121 MMHG | HEIGHT: 66.5 IN | DIASTOLIC BLOOD PRESSURE: 89 MMHG | BODY MASS INDEX: 31.76 KG/M2

## 2024-04-09 DIAGNOSIS — D64.9 ANEMIA, UNSPECIFIED: ICD-10-CM

## 2024-04-09 PROCEDURE — 99214 OFFICE O/P EST MOD 30 MIN: CPT

## 2024-04-09 PROCEDURE — 36415 COLL VENOUS BLD VENIPUNCTURE: CPT

## 2024-04-09 NOTE — HEALTH RISK ASSESSMENT
[0] : 2) Feeling down, depressed, or hopeless: Not at all (0) [PHQ-2 Negative - No further assessment needed] : PHQ-2 Negative - No further assessment needed [[id number]] : 0

## 2024-04-09 NOTE — PHYSICAL EXAM
[No Acute Distress] : no acute distress [Well Nourished] : well nourished [Well Developed] : well developed [Well-Appearing] : well-appearing [Normal Voice/Communication] : normal voice/communication [No JVD] : no jugular venous distention [No Accessory Muscle Use] : no accessory muscle use [No Rash] : no rash [Speech Grossly Normal] : speech grossly normal [Memory Grossly Normal] : memory grossly normal [Normal Mood] : the mood was normal [Normal Insight/Judgement] : insight and judgment were intact [Normal Sclera/Conjunctiva] : normal sclera/conjunctiva [PERRL] : pupils equal round and reactive to light [EOMI] : extraocular movements intact [Normal Outer Ear/Nose] : the outer ears and nose were normal in appearance [Normal Oropharynx] : the oropharynx was normal [No Lymphadenopathy] : no lymphadenopathy [Supple] : supple [No Respiratory Distress] : no respiratory distress  [Clear to Auscultation] : lungs were clear to auscultation bilaterally [Normal Rate] : normal rate  [Regular Rhythm] : with a regular rhythm [Normal S1, S2] : normal S1 and S2 [No Carotid Bruits] : no carotid bruits [No Abdominal Bruit] : a ~M bruit was not heard ~T in the abdomen [No Varicosities] : no varicosities [No Edema] : there was no peripheral edema [No Palpable Aorta] : no palpable aorta [No Extremity Clubbing/Cyanosis] : no extremity clubbing/cyanosis [Soft] : abdomen soft [Non Tender] : non-tender [Non-distended] : non-distended [No HSM] : no HSM [Normal Bowel Sounds] : normal bowel sounds [Normal Posterior Cervical Nodes] : no posterior cervical lymphadenopathy [Normal Anterior Cervical Nodes] : no anterior cervical lymphadenopathy [No CVA Tenderness] : no CVA  tenderness [No Spinal Tenderness] : no spinal tenderness [No Joint Swelling] : no joint swelling [Grossly Normal Strength/Tone] : grossly normal strength/tone [Coordination Grossly Intact] : coordination grossly intact [No Focal Deficits] : no focal deficits [Normal Gait] : normal gait [Normal Affect] : the affect was normal

## 2024-04-09 NOTE — PLAN
[FreeTextEntry1] : Patient with sinusitis, will start antibiotics and intranasal steroid today.   Patient's signs and symptoms consistent with URI. Patient educated about signs and symptoms of URI, they typically last up to 14 days but may last longer. Patient advised to continue OTC medication for symptomatic relief.  HTN- patient's BP well controlled with current medication. will continue current  regimen    Regarding patient's obesity - encourage lifestyle modifications - diet and exercise - reduce calorie intake - no soda/ junk food/ snacks - consider mixed grains/ whole grains, leafy vegetables, and an appropriate serving of protein   Patient requesting Wegovy for weight loss- patient was made aware that prescription may not be covered by insurance.   Prior to appointment and during encounter with patient extensive medical records were reviewed including but not limited to, Hospital records, out patient records, laboratory data and microbiology data In addition extensive time was also spent in reviewing diagnostic studies.   Total encounter total time 30 mins >50% of time spent counseling/coordinating care   Counseling included abnormal lab results, differential diagnoses, treatment options, risks and benefits, lifestyle changes, current condition, medications, and dose adjustments.  The patient was interactive, attentive, asked questions, and verbalized understanding

## 2024-04-09 NOTE — HISTORY OF PRESENT ILLNESS
[FreeTextEntry1] : follow up [de-identified] : Ms. ARIADNE DESOUZA is a 43 year female with a PMH of HTN and Asthma. Came in for follow up on weight loss and HTN. HTN is well managed with medication.

## 2024-04-09 NOTE — REVIEW OF SYSTEMS
[Discharge] : no discharge [Redness] : no redness [Itching] : no itching [Hearing Loss] : no hearing loss [Nosebleed] : no nosebleeds [Hoarseness] : no hoarseness [Wheezing] : no wheezing [Cough] : no cough [Confusion] : no confusion [Memory Loss] : no memory loss [Negative] : Heme/Lymph

## 2024-04-10 LAB
25(OH)D3 SERPL-MCNC: 21.1 NG/ML
ALBUMIN SERPL ELPH-MCNC: 4.3 G/DL
ALP BLD-CCNC: 66 U/L
ALT SERPL-CCNC: 7 U/L
ANION GAP SERPL CALC-SCNC: 15 MMOL/L
AST SERPL-CCNC: 13 U/L
BASOPHILS # BLD AUTO: 0.06 K/UL
BASOPHILS NFR BLD AUTO: 0.7 %
BILIRUB SERPL-MCNC: 0.5 MG/DL
BUN SERPL-MCNC: 13 MG/DL
CALCIUM SERPL-MCNC: 9.7 MG/DL
CHLORIDE SERPL-SCNC: 101 MMOL/L
CHOLEST SERPL-MCNC: 135 MG/DL
CO2 SERPL-SCNC: 21 MMOL/L
CREAT SERPL-MCNC: 0.81 MG/DL
EGFR: 92 ML/MIN/1.73M2
EOSINOPHIL # BLD AUTO: 0.21 K/UL
EOSINOPHIL NFR BLD AUTO: 2.5 %
ESTIMATED AVERAGE GLUCOSE: 103 MG/DL
FERRITIN SERPL-MCNC: 82 NG/ML
FOLATE SERPL-MCNC: 6.8 NG/ML
GLUCOSE SERPL-MCNC: 93 MG/DL
HBA1C MFR BLD HPLC: 5.2 %
HCT VFR BLD CALC: 41.6 %
HDLC SERPL-MCNC: 57 MG/DL
HGB BLD-MCNC: 13.8 G/DL
IMM GRANULOCYTES NFR BLD AUTO: 0.2 %
IRON SATN MFR SERPL: 18 %
IRON SERPL-MCNC: 63 UG/DL
LDLC SERPL CALC-MCNC: 64 MG/DL
LYMPHOCYTES # BLD AUTO: 1.34 K/UL
LYMPHOCYTES NFR BLD AUTO: 15.9 %
MAGNESIUM SERPL-MCNC: 2.2 MG/DL
MAN DIFF?: NORMAL
MCHC RBC-ENTMCNC: 29.7 PG
MCHC RBC-ENTMCNC: 33.2 GM/DL
MCV RBC AUTO: 89.7 FL
MONOCYTES # BLD AUTO: 0.57 K/UL
MONOCYTES NFR BLD AUTO: 6.7 %
NEUTROPHILS # BLD AUTO: 6.25 K/UL
NEUTROPHILS NFR BLD AUTO: 74 %
NONHDLC SERPL-MCNC: 78 MG/DL
PLATELET # BLD AUTO: 408 K/UL
POTASSIUM SERPL-SCNC: 4.1 MMOL/L
PROT SERPL-MCNC: 7.2 G/DL
RBC # BLD: 4.64 M/UL
RBC # FLD: 15.1 %
SODIUM SERPL-SCNC: 138 MMOL/L
TIBC SERPL-MCNC: 340 UG/DL
TRANSFERRIN SERPL-MCNC: 259 MG/DL
TRIGL SERPL-MCNC: 71 MG/DL
TSH SERPL-ACNC: 1.51 UIU/ML
UIBC SERPL-MCNC: 277 UG/DL
VIT B12 SERPL-MCNC: 427 PG/ML
WBC # FLD AUTO: 8.45 K/UL

## 2024-04-16 ENCOUNTER — APPOINTMENT (OUTPATIENT)
Dept: ORTHOPEDIC SURGERY | Facility: CLINIC | Age: 44
End: 2024-04-16

## 2024-04-16 VITALS — WEIGHT: 200 LBS | HEIGHT: 66.5 IN | BODY MASS INDEX: 31.76 KG/M2

## 2024-04-16 DIAGNOSIS — M79.18 MYALGIA, OTHER SITE: ICD-10-CM

## 2024-04-16 PROCEDURE — 20610 DRAIN/INJ JOINT/BURSA W/O US: CPT | Mod: 50

## 2024-04-16 PROCEDURE — 99213 OFFICE O/P EST LOW 20 MIN: CPT | Mod: 25

## 2024-04-16 PROCEDURE — 73564 X-RAY EXAM KNEE 4 OR MORE: CPT | Mod: 50

## 2024-04-16 NOTE — IMAGING
[de-identified] : RIGHT KNEE Inspection:  mild effusion, pop cyst Palpation: medial joint line tenderness, anterior tenderness Knee Range of Motion:  3-125  Strength: 5/5 Quadriceps strength, 5/5 Hamstring strength Neurological: light touch is intact throughout Ligament Stability and Special Tests:  McMurrays: neg Lachman: neg Pivot Shift: neg Posterior Drawer: neg Valgus: neg Varus: neg Patella Apprehension: neg Patella Maltracking: neg    LEFT KNEE Inspection:  mild effusion Palpation: medial joint line tenderness, anterior tenderness Knee Range of Motion:  3-125  Strength: 5/5 Quadriceps strength, 5/5 Hamstring strength Neurological: light touch is intact throughout Ligament Stability and Special Tests:  McMurrays: neg Lachman: neg Pivot Shift: neg Posterior Drawer: neg Valgus: neg Varus: neg Patella Apprehension: neg Patella Maltracking: neg

## 2024-04-16 NOTE — HISTORY OF PRESENT ILLNESS
[de-identified] : 43 year old female  ((, runner) bilateral knee pain L>R since 2020, no SOLO, worse recently. pain located at the lateral aspect of bilateral knees with associated clicking and catching. worse stairs and running, better at rest. tried HEP, Advil and icing with some relief.  4/19/2022-  had b/l CSI (2/8) and sent for PT but worse L>R 7/19/22 - wants to discuss poss b/l visco 7/25/22 - b/l euflexxa #2 8/2/22 - b/l euflexxa #3  4/16/24 - here to f/u b/l knee pain R>L, had some relief from visco and CSI injections but pain returns since 2024

## 2024-04-16 NOTE — ASSESSMENT
[FreeTextEntry1] : Bilateral  X-Ray Examination of the KNEE (4 views): medial and patellofemoral degenerate changes.   - We discussed their diagnosis and treatment options at length including the risks and benefits of both surgical treatment with a knee replacement and non-surgical options. - Due to risks of surgery, we will continue conservative treatment with PT, icing, and anti-inflammatory medications - The patient was provided with a PT prescription to work on ROM, hip ER/abductors strengthening, quad/hamstring stretches and strengthening, and other exercises - The patient was advised to let pain guide the gradual advancement of activities. - We also discussed the possible of a corticosteroid injection in order to help decrease inflammation and pain so that they can perform better therapy. - The risks, benefits, and alternatives to corticosteroid injection were reviewed with the patient and they wished to proceed with this treatment course. - Follow up as needed in 6 weeks to re-evaluate, if no improvement we spoke about possibility of viscosupplementation injections - will submit for auth

## 2024-04-17 ENCOUNTER — APPOINTMENT (OUTPATIENT)
Dept: OBGYN | Facility: CLINIC | Age: 44
End: 2024-04-17

## 2024-04-17 NOTE — ASSESSMENT
[FreeTextEntry1] : Holter monitor April 9, 2021 showed a presenting rhythm of sinus with an average heart rate of 93 bpm, maximum 140, minimum 73 bpm. There were 2 PACs recorded throughout the Holter.. Patient had 2 episodes of palpitations which correlated with sinus rhythm and a PAC.\par \par Echocardiogram May 5, 2021 demonstrated left ventricle normal size and function with ejection fraction of 60 to 65%. Trace mitral and pulmonic regurgitation noted. No significant structural abnormalities.\par \par 40-year-old female with past medical history of hypertension and obesity who presented to me for evaluation of recent palpitations. Cardiac testing is unremarkable with a Holter showing no significant arrhythmia even despite her having palpitations during the Holter. Echocardiogram shows a normal EF and is otherwise unremarkable. Patient's palpitations are overall improved although she did have an episode overnight on the weekend. She did have a couple glasses of alcohol at that time. She also admits to not being very good with drinking water which may be related. She is also having some dizziness which is likely related to dehydration as well. She has not been checking her blood pressure and I have encouraged her to do this to ensure her blood pressure is adequately controlled on her current regimen. I have also instructed her not to double her medications without speaking to me and certainly not to double her HCTZ at all. - Dc planning to home   - CM working with insurance company and daughter to determine post dc care

## 2024-04-29 ENCOUNTER — APPOINTMENT (OUTPATIENT)
Dept: OBGYN | Facility: CLINIC | Age: 44
End: 2024-04-29

## 2024-05-01 NOTE — PATIENT PROFILE ADULT - FALL HARM RISK
May 3, 2024     Patient: Justyna Alcantar   YOB: 2007   Date of Visit: 5/1/2024       To Whom it May Concern:    Justyna Alcantar was seen in my clinic on 5/1/2024.     If you have any questions or concerns, please don't hesitate to call.         Sincerely,          Aries Lockett MD        CC: No Recipients   surgery

## 2024-05-07 ENCOUNTER — APPOINTMENT (OUTPATIENT)
Dept: INTERNAL MEDICINE | Facility: CLINIC | Age: 44
End: 2024-05-07
Payer: COMMERCIAL

## 2024-05-07 VITALS
SYSTOLIC BLOOD PRESSURE: 125 MMHG | WEIGHT: 205 LBS | HEIGHT: 66.5 IN | BODY MASS INDEX: 32.56 KG/M2 | HEART RATE: 88 BPM | DIASTOLIC BLOOD PRESSURE: 86 MMHG

## 2024-05-07 DIAGNOSIS — J45.30 MILD PERSISTENT ASTHMA, UNCOMPLICATED: ICD-10-CM

## 2024-05-07 PROCEDURE — 99214 OFFICE O/P EST MOD 30 MIN: CPT

## 2024-05-07 RX ORDER — BENZONATATE 200 MG/1
200 CAPSULE ORAL
Qty: 30 | Refills: 0 | Status: COMPLETED | COMMUNITY
Start: 2024-03-19 | End: 2024-05-07

## 2024-05-07 RX ORDER — TRAZODONE HYDROCHLORIDE 100 MG/1
100 TABLET ORAL
Qty: 180 | Refills: 3 | Status: ACTIVE | COMMUNITY
Start: 2022-12-06 | End: 1900-01-01

## 2024-05-07 NOTE — HEALTH RISK ASSESSMENT
[0] : 2) Feeling down, depressed, or hopeless: Not at all (0) [PHQ-2 Negative - No further assessment needed] : PHQ-2 Negative - No further assessment needed [[id number]] : 0

## 2024-05-07 NOTE — PLAN
[FreeTextEntry1] : HTN- patient's BP well controlled with current medication. will continue current  regimen    Insomnia- will increase trazodone from 100 to 200 mg PO QHS  Regarding patient's obesity - encourage lifestyle modifications - diet and exercise - reduce calorie intake - no soda/ junk food/ snacks - consider mixed grains/ whole grains, leafy vegetables, and an appropriate serving of protein   Patient states Wegovy is helping with appetite suppression, but is requesting increase in dosage, she is tolerating 0.5 dose currently with out any issues.  Prior to appointment and during encounter with patient extensive medical records were reviewed including but not limited to, Hospital records, out patient records, laboratory data and microbiology data In addition extensive time was also spent in reviewing diagnostic studies.   Total encounter total time 30 mins >50% of time spent counseling/coordinating care   Counseling included abnormal lab results, differential diagnoses, treatment options, risks and benefits, lifestyle changes, current condition, medications, and dose adjustments.  The patient was interactive, attentive, asked questions, and verbalized understanding

## 2024-05-07 NOTE — HISTORY OF PRESENT ILLNESS
[FreeTextEntry1] : follow up [de-identified] : Ms. ARIADNE DESOUZA is a 43 year female with a PMH of HTN and Asthma. Came in for follow up on weight loss and HTN. HTN is well managed with medication. Patient denies fever, cough SOB. No other complaints at this time.

## 2024-05-07 NOTE — PHYSICAL EXAM
[No Acute Distress] : no acute distress [Well Nourished] : well nourished [Well Developed] : well developed [Well-Appearing] : well-appearing [Normal Voice/Communication] : normal voice/communication [Normal Sclera/Conjunctiva] : normal sclera/conjunctiva [PERRL] : pupils equal round and reactive to light [EOMI] : extraocular movements intact [Normal Outer Ear/Nose] : the outer ears and nose were normal in appearance [Normal Oropharynx] : the oropharynx was normal [No JVD] : no jugular venous distention [No Lymphadenopathy] : no lymphadenopathy [Supple] : supple [No Respiratory Distress] : no respiratory distress  [No Accessory Muscle Use] : no accessory muscle use [Clear to Auscultation] : lungs were clear to auscultation bilaterally [Normal Rate] : normal rate  [Regular Rhythm] : with a regular rhythm [Normal S1, S2] : normal S1 and S2 [No Carotid Bruits] : no carotid bruits [No Abdominal Bruit] : a ~M bruit was not heard ~T in the abdomen [No Varicosities] : no varicosities [No Edema] : there was no peripheral edema [No Palpable Aorta] : no palpable aorta [No Extremity Clubbing/Cyanosis] : no extremity clubbing/cyanosis [Soft] : abdomen soft [Non Tender] : non-tender [Non-distended] : non-distended [No HSM] : no HSM [Normal Bowel Sounds] : normal bowel sounds [Normal Posterior Cervical Nodes] : no posterior cervical lymphadenopathy [Normal Anterior Cervical Nodes] : no anterior cervical lymphadenopathy [No CVA Tenderness] : no CVA  tenderness [No Spinal Tenderness] : no spinal tenderness [No Joint Swelling] : no joint swelling [Grossly Normal Strength/Tone] : grossly normal strength/tone [No Rash] : no rash [Coordination Grossly Intact] : coordination grossly intact [No Focal Deficits] : no focal deficits [Normal Gait] : normal gait [Speech Grossly Normal] : speech grossly normal [Memory Grossly Normal] : memory grossly normal [Normal Affect] : the affect was normal [Normal Mood] : the mood was normal [Normal Insight/Judgement] : insight and judgment were intact

## 2024-05-24 PROBLEM — M17.11 ARTHRITIS OF KNEE, RIGHT: Status: ACTIVE | Noted: 2022-04-19

## 2024-05-24 PROBLEM — M17.12 ARTHRITIS OF KNEE, LEFT: Status: ACTIVE | Noted: 2022-04-19

## 2024-05-24 NOTE — HISTORY OF PRESENT ILLNESS
[de-identified] : 43 year old female  ((, runner) bilateral knee pain L>R since 2020, no SOLO, worse recently. pain located at the lateral aspect of bilateral knees with associated clicking and catching. worse stairs and running, better at rest. tried HEP, Advil and icing with some relief.  4/19/2022-  had b/l CSI (2/8) and sent for PT but worse L>R 7/19/22 - wants to discuss poss b/l visco 7/25/22 - b/l euflexxa #2 8/2/22 - b/l euflexxa #3  4/16/24 - here to f/u b/l knee pain R>L, had some relief from visco and CSI injections but pain returns since 2024 5/28/24 - had CSI (4/16), continue to have knee pain

## 2024-05-24 NOTE — PROCEDURE
[FreeTextEntry3] :  Injection Procedure Note:  The risks, benefits, and alternatives to viscosupplementation injection were reviewed with the patient. Risks outlined include but are not limited to infection, sepsis, bleeding, scarring, temporary increase in pain, syncopal episode, failure to resolve symptoms, symptoms recurrence, allergic reaction, flare reaction, pseudoseptic reaction.  Patient understood the risks and asked to proceed with this treatment course.  Patient Identification Name/: Verbal with patient and/or family  Procedure Verification: Procedure confirmed with patient or family/designee Consent for procedure: Verbal Consent Given Relevant documentation completed, reviewed, and signed Clinical indications for procedure confirmed  Time-out with all members of procedure team immediately prior to procedure: Correct patient identified. Agreement on procedure. Correct side and site.   KNEE INJECTION (Visco) - BILATERAL After verbal consent and identification of the correct patient and correct site, bilateral knees were prepped using alcohol swabs and betadine. This was allowed time to air dry.  An injection of Euflexxa 2ml  #1  was injected into the knees using a sterile 22G needle after ethyl chloride spray for skin anesthesia. The patient tolerated the procedure well. After-care instructions were provided and included instructions to ice the area and to call if redness, pain, or fever develop.

## 2024-05-24 NOTE — IMAGING
[de-identified] : RIGHT KNEE Inspection:  mild effusion, pop cyst Palpation: medial joint line tenderness, anterior tenderness Knee Range of Motion:  3-125  Strength: 5/5 Quadriceps strength, 5/5 Hamstring strength Neurological: light touch is intact throughout Ligament Stability and Special Tests:  McMurrays: neg Lachman: neg Pivot Shift: neg Posterior Drawer: neg Valgus: neg Varus: neg Patella Apprehension: neg Patella Maltracking: neg    LEFT KNEE Inspection:  mild effusion Palpation: medial joint line tenderness, anterior tenderness Knee Range of Motion:  3-125  Strength: 5/5 Quadriceps strength, 5/5 Hamstring strength Neurological: light touch is intact throughout Ligament Stability and Special Tests:  McMurrays: neg Lachman: neg Pivot Shift: neg Posterior Drawer: neg Valgus: neg Varus: neg Patella Apprehension: neg Patella Maltracking: neg

## 2024-05-24 NOTE — ASSESSMENT
[FreeTextEntry1] :  - We discussed their diagnosis and treatment options at length including the risks and benefits of both surgical treatment with a knee replacement and non-surgical options. - Due to risks of surgery, we will continue conservative treatment with PT, icing, and anti-inflammatory medications - The patient was provided with a PT prescription to work on ROM, hip ER/abductors strengthening, quad/hamstring stretches and strengthening, and other exercises - The patient was advised to let pain guide the gradual advancement of activities. - we spoke about possibility of viscosupplementation injections - they would like to proceed - B/L knee Euflexxa #1 given, rosette well    Medication Discussion: 1) We discussed a comprehensive treatment plan that included possible pharmaceutical management involving the use of prescription strength medications including but not limited to options such as oral Naprosyn 500mg BID, once daily Meloxicam 15 mg, or 500-650 mg Tylenol versus over the counter oral medications in addition to discussing possible topical prescription Pennsaid vs  Voltaren gel. 2) There is a moderate risk of morbidity with further treatment, especially from use of prescription strength medications and possible side effects of these medications which include but are not limited to upset stomach with oral medications, skin reactions to topical medications and GI/cardiac/renal issues with long term use. 3) I recommended that the patient follow-up with their medical physician if there are any significant potential issues with long term medication use such as interactions with current medications or with exacerbation of underlying medical comorbidities. 4) The benefits and risks associated with use of oral and / or topical prescription and over the counter anti-inflammatory medications were discussed with the patient. The patient voiced understanding of the risks including but not limited to bleeding, stroke, kidney dysfunction, heart disease, and were referred to the black box warning label for further information.

## 2024-05-28 ENCOUNTER — APPOINTMENT (OUTPATIENT)
Dept: ORTHOPEDIC SURGERY | Facility: CLINIC | Age: 44
End: 2024-05-28

## 2024-05-28 DIAGNOSIS — M17.11 UNILATERAL PRIMARY OSTEOARTHRITIS, RIGHT KNEE: ICD-10-CM

## 2024-05-28 DIAGNOSIS — M17.12 UNILATERAL PRIMARY OSTEOARTHRITIS, LEFT KNEE: ICD-10-CM

## 2024-05-30 RX ORDER — SEMAGLUTIDE 1 MG/.5ML
1 INJECTION, SOLUTION SUBCUTANEOUS
Qty: 3 | Refills: 1 | Status: DISCONTINUED | COMMUNITY
Start: 2024-04-09 | End: 2024-05-30

## 2024-05-31 ENCOUNTER — OUTPATIENT (OUTPATIENT)
Dept: OUTPATIENT SERVICES | Facility: HOSPITAL | Age: 44
LOS: 1 days | Discharge: HOME | End: 2024-05-31

## 2024-05-31 DIAGNOSIS — Z98.89 OTHER SPECIFIED POSTPROCEDURAL STATES: Chronic | ICD-10-CM

## 2024-05-31 DIAGNOSIS — N20.0 CALCULUS OF KIDNEY: Chronic | ICD-10-CM

## 2024-05-31 DIAGNOSIS — D47.3 ESSENTIAL (HEMORRHAGIC) THROMBOCYTHEMIA: ICD-10-CM

## 2024-05-31 DIAGNOSIS — D50.9 IRON DEFICIENCY ANEMIA, UNSPECIFIED: ICD-10-CM

## 2024-06-27 ENCOUNTER — RX CHANGE (OUTPATIENT)
Age: 44
End: 2024-06-27

## 2024-06-28 ENCOUNTER — APPOINTMENT (OUTPATIENT)
Dept: INTERNAL MEDICINE | Facility: CLINIC | Age: 44
End: 2024-06-28
Payer: COMMERCIAL

## 2024-06-28 VITALS
DIASTOLIC BLOOD PRESSURE: 80 MMHG | WEIGHT: 199 LBS | HEART RATE: 75 BPM | BODY MASS INDEX: 31.6 KG/M2 | HEIGHT: 66.5 IN | SYSTOLIC BLOOD PRESSURE: 125 MMHG

## 2024-06-28 DIAGNOSIS — E66.9 OBESITY, UNSPECIFIED: ICD-10-CM

## 2024-06-28 DIAGNOSIS — R79.9 ABNORMAL FINDING OF BLOOD CHEMISTRY, UNSPECIFIED: ICD-10-CM

## 2024-06-28 DIAGNOSIS — E55.9 VITAMIN D DEFICIENCY, UNSPECIFIED: ICD-10-CM

## 2024-06-28 DIAGNOSIS — G47.00 INSOMNIA, UNSPECIFIED: ICD-10-CM

## 2024-06-28 DIAGNOSIS — D50.9 IRON DEFICIENCY ANEMIA, UNSPECIFIED: ICD-10-CM

## 2024-06-28 DIAGNOSIS — R53.83 OTHER FATIGUE: ICD-10-CM

## 2024-06-28 DIAGNOSIS — I10 ESSENTIAL (PRIMARY) HYPERTENSION: ICD-10-CM

## 2024-06-28 PROCEDURE — 99214 OFFICE O/P EST MOD 30 MIN: CPT

## 2024-06-28 PROCEDURE — 36415 COLL VENOUS BLD VENIPUNCTURE: CPT

## 2024-07-01 LAB
25(OH)D3 SERPL-MCNC: 37 NG/ML
ALBUMIN SERPL ELPH-MCNC: 4.4 G/DL
ALP BLD-CCNC: 74 U/L
ALT SERPL-CCNC: 11 U/L
ANION GAP SERPL CALC-SCNC: 13 MMOL/L
AST SERPL-CCNC: 12 U/L
BASOPHILS # BLD AUTO: 0.05 K/UL
BASOPHILS NFR BLD AUTO: 0.7 %
BILIRUB SERPL-MCNC: 0.4 MG/DL
BUN SERPL-MCNC: 10 MG/DL
CALCIUM SERPL-MCNC: 10.1 MG/DL
CHLORIDE SERPL-SCNC: 102 MMOL/L
CHOLEST SERPL-MCNC: 150 MG/DL
CO2 SERPL-SCNC: 24 MMOL/L
CREAT SERPL-MCNC: 0.9 MG/DL
EGFR: 80 ML/MIN/1.73M2
EOSINOPHIL # BLD AUTO: 0.16 K/UL
EOSINOPHIL NFR BLD AUTO: 2.4 %
ESTIMATED AVERAGE GLUCOSE: 97 MG/DL
GLUCOSE SERPL-MCNC: 74 MG/DL
HBA1C MFR BLD HPLC: 5 %
HCT VFR BLD CALC: 42.8 %
HDLC SERPL-MCNC: 49 MG/DL
HGB BLD-MCNC: 13.8 G/DL
IMM GRANULOCYTES NFR BLD AUTO: 0.1 %
LDLC SERPL CALC-MCNC: 70 MG/DL
LYMPHOCYTES # BLD AUTO: 1.33 K/UL
LYMPHOCYTES NFR BLD AUTO: 19.7 %
MAN DIFF?: NORMAL
MCHC RBC-ENTMCNC: 30.5 PG
MCHC RBC-ENTMCNC: 32.2 GM/DL
MCV RBC AUTO: 94.5 FL
MONOCYTES # BLD AUTO: 0.38 K/UL
MONOCYTES NFR BLD AUTO: 5.6 %
NEUTROPHILS # BLD AUTO: 4.83 K/UL
NEUTROPHILS NFR BLD AUTO: 71.5 %
NONHDLC SERPL-MCNC: 102 MG/DL
PLATELET # BLD AUTO: 430 K/UL
POTASSIUM SERPL-SCNC: 3.9 MMOL/L
PROT SERPL-MCNC: 7.7 G/DL
RBC # BLD: 4.53 M/UL
RBC # FLD: 14.1 %
SODIUM SERPL-SCNC: 139 MMOL/L
TRIGL SERPL-MCNC: 193 MG/DL
TSH SERPL-ACNC: 2.54 UIU/ML
WBC # FLD AUTO: 6.76 K/UL

## 2024-07-11 NOTE — ED ADULT NURSE NOTE - CAS DISCH CONDITION
Physician provider: Dr. Fung  Reason for today's call (Please detail here patients chief complaint): blood in urine  Last office visit:07/03/24  Preferred pharmacy (If refill request): [facility]  Calls to office within the last 48 hours?:No    Patient notified that their information will be routed to the [facility] clinical triage team for review. Patient is advised that they will receive a phone call from the triage department. If symptom related and symptoms worsen before receiving a call back, the patient has been advised to proceed to the nearest ED.    Pt state he still has light blood in his urine & has taken all the antibiotics medication  so Pt want to know if he can get more antibiotics prescribed..   Stable

## 2024-07-26 ENCOUNTER — APPOINTMENT (OUTPATIENT)
Dept: HEMATOLOGY ONCOLOGY | Facility: CLINIC | Age: 44
End: 2024-07-26

## 2024-07-26 DIAGNOSIS — D50.9 IRON DEFICIENCY ANEMIA, UNSPECIFIED: ICD-10-CM

## 2024-08-05 ENCOUNTER — APPOINTMENT (OUTPATIENT)
Dept: INTERNAL MEDICINE | Facility: CLINIC | Age: 44
End: 2024-08-05

## 2024-08-05 PROCEDURE — 36415 COLL VENOUS BLD VENIPUNCTURE: CPT

## 2024-08-05 PROCEDURE — G2211 COMPLEX E/M VISIT ADD ON: CPT

## 2024-08-05 PROCEDURE — 99214 OFFICE O/P EST MOD 30 MIN: CPT

## 2024-08-05 NOTE — PLAN
[FreeTextEntry1] : HTN- patient's BP well controlled with current medication. will continue current  regimen    Insomnia- will increase trazodone from  200 mg PO QHS  Anxiety- ISTOP CHECKED Xanax prescribed Patient was advised not to drink alcohol with this medication,  Patient advised not to drive while on this medication Patient was advised that long term side effects of this medication including, but not limited to, Addiction, withdrawal, Cognitive impairment,anterograde amnesia  Motor vehicle crashes, and increased risk of hip fracture.  Patient understands risk and wishes to continue medication.   Regarding patient's obesity - encourage lifestyle modifications - diet and exercise - reduce calorie intake - no soda/ junk food/ snacks - consider mixed grains/ whole grains, leafy vegetables, and an appropriate serving of protein   Patient will continue to take zepbound, will increase dose from 5 to 7.5 qweekly  Prior to appointment and during encounter with patient extensive medical records were reviewed including but not limited to, Hospital records, out patient records, laboratory data and microbiology data In addition extensive time was also spent in reviewing diagnostic studies.   Total encounter total time 30 mins >50% of time spent counseling/coordinating care   Counseling included abnormal lab results, differential diagnoses, treatment options, risks and benefits, lifestyle changes, current condition, medications, and dose adjustments.  The patient was interactive, attentive, asked questions, and verbalized understanding

## 2024-08-05 NOTE — HEALTH RISK ASSESSMENT
[0] : 2) Feeling down, depressed, or hopeless: Not at all (0) [PHQ-2 Negative - No further assessment needed] : PHQ-2 Negative - No further assessment needed [Never] : Never [[id number]] : 0

## 2024-08-05 NOTE — HEALTH RISK ASSESSMENT
[0] : 2) Feeling down, depressed, or hopeless: Not at all (0) [PHQ-2 Negative - No further assessment needed] : PHQ-2 Negative - No further assessment needed [Never] : Never [[id number]] : 0

## 2024-08-05 NOTE — HISTORY OF PRESENT ILLNESS
[FreeTextEntry1] : follow up [de-identified] : Ms. ARIADNE DEOSUZA is a 44 year female with a PMH of HTN and Asthma. Came in for follow up on weight loss and HTN. HTN is well managed with medication. Patient denies fever, cough SOB. No other complaints at this time.

## 2024-08-05 NOTE — HISTORY OF PRESENT ILLNESS
[FreeTextEntry1] : follow up [de-identified] : Ms. ARIADNE DESOUZA is a 44 year female with a PMH of HTN and Asthma. Came in for follow up on weight loss and HTN. HTN is well managed with medication. Patient denies fever, cough SOB. No other complaints at this time.

## 2024-08-05 NOTE — REVIEW OF SYSTEMS
[Negative] : Heme/Lymph [Discharge] : no discharge [Redness] : no redness [Itching] : no itching [Hearing Loss] : no hearing loss [Nosebleed] : no nosebleeds [Hoarseness] : no hoarseness [Wheezing] : no wheezing [Cough] : no cough [Confusion] : no confusion [Memory Loss] : no memory loss

## 2024-08-06 ENCOUNTER — APPOINTMENT (OUTPATIENT)
Dept: OBGYN | Facility: CLINIC | Age: 44
End: 2024-08-06

## 2024-08-06 PROCEDURE — 99459 PELVIC EXAMINATION: CPT

## 2024-08-06 PROCEDURE — 99214 OFFICE O/P EST MOD 30 MIN: CPT

## 2024-08-06 NOTE — PLAN
[FreeTextEntry1] : 43 y/o with AUB likely 2/2 fibroid uterus  Plan: - Referral for pelvic sono to check IUD placement - can continue aygestin twice daily for bleeding - if fails medical management of bleeding, may need to consider other options

## 2024-08-06 NOTE — PHYSICAL EXAM
[Chaperone Present] : A chaperone was present in the examining room during all aspects of the physical examination [27068] : A chaperone was present during the pelvic exam. [FreeTextEntry2] : Sulma SAWYER [Appropriately responsive] : appropriately responsive [Alert] : alert [No Acute Distress] : no acute distress [Regular Rate Rhythm] : regular rate rhythm [Oriented x3] : oriented x3 [Labia Majora] : normal [Labia Minora] : normal [Normal] : normal [Moderate] : There was moderate vaginal bleeding [Old Blood] : old blood was present in the vagina [FreeTextEntry5] : Difficult to visualize

## 2024-08-06 NOTE — HISTORY OF PRESENT ILLNESS
[FreeTextEntry1] : 43 y/o with known fibroid uterus, Mirena IUD presents with AUB. Reports after D&C, hysteroscopy and IUD placement, her bleeding was well managed. For the past month started having heavy, prolonged bleeding again. Was away in [place] and had bleeding daily. Reports more cramping as well.

## 2024-08-13 ENCOUNTER — APPOINTMENT (OUTPATIENT)
Dept: OBGYN | Facility: CLINIC | Age: 44
End: 2024-08-13

## 2024-08-20 NOTE — PATIENT PROFILE ADULT - CENTRAL VENOUS CATHETER/PICC LINE
Anticipated Discharge Disposition: home    Action:   Call to pt. Unable to speak with him. Wife answered phone and pt is sleeping and unable to come to phone to give permission. Attempted to leave contact number for ER CM but unable. Advised Mychart access.    Barriers to Discharge: none    Plan: nofurther needs  
Anticipated Discharge Disposition: home    Action: Prior auth request received/ cover my meds started      Barriers to Discharge: none    Plan: Will cont to follow  
no

## 2024-09-05 ENCOUNTER — ASOB RESULT (OUTPATIENT)
Age: 44
End: 2024-09-05

## 2024-09-05 ENCOUNTER — APPOINTMENT (OUTPATIENT)
Dept: OBGYN | Facility: CLINIC | Age: 44
End: 2024-09-05
Payer: COMMERCIAL

## 2024-09-05 PROCEDURE — 76857 US EXAM PELVIC LIMITED: CPT | Mod: 59

## 2024-09-05 PROCEDURE — 76830 TRANSVAGINAL US NON-OB: CPT

## 2024-09-06 ENCOUNTER — NON-APPOINTMENT (OUTPATIENT)
Age: 44
End: 2024-09-06

## 2024-09-11 ENCOUNTER — EMERGENCY (EMERGENCY)
Facility: HOSPITAL | Age: 44
LOS: 0 days | Discharge: HOME | End: 2024-09-11
Attending: EMERGENCY MEDICINE
Payer: COMMERCIAL

## 2024-09-11 VITALS
TEMPERATURE: 98 F | WEIGHT: 181 LBS | SYSTOLIC BLOOD PRESSURE: 152 MMHG | RESPIRATION RATE: 18 BRPM | DIASTOLIC BLOOD PRESSURE: 100 MMHG | OXYGEN SATURATION: 100 % | HEART RATE: 111 BPM

## 2024-09-11 VITALS
SYSTOLIC BLOOD PRESSURE: 125 MMHG | RESPIRATION RATE: 16 BRPM | TEMPERATURE: 99 F | DIASTOLIC BLOOD PRESSURE: 88 MMHG | OXYGEN SATURATION: 99 % | HEART RATE: 93 BPM

## 2024-09-11 DIAGNOSIS — E03.9 HYPOTHYROIDISM, UNSPECIFIED: ICD-10-CM

## 2024-09-11 DIAGNOSIS — Z88.1 ALLERGY STATUS TO OTHER ANTIBIOTIC AGENTS: ICD-10-CM

## 2024-09-11 DIAGNOSIS — M79.602 PAIN IN LEFT ARM: ICD-10-CM

## 2024-09-11 DIAGNOSIS — R07.9 CHEST PAIN, UNSPECIFIED: ICD-10-CM

## 2024-09-11 DIAGNOSIS — Z98.89 OTHER SPECIFIED POSTPROCEDURAL STATES: Chronic | ICD-10-CM

## 2024-09-11 DIAGNOSIS — R00.0 TACHYCARDIA, UNSPECIFIED: ICD-10-CM

## 2024-09-11 DIAGNOSIS — Z88.0 ALLERGY STATUS TO PENICILLIN: ICD-10-CM

## 2024-09-11 DIAGNOSIS — N20.0 CALCULUS OF KIDNEY: Chronic | ICD-10-CM

## 2024-09-11 DIAGNOSIS — K21.9 GASTRO-ESOPHAGEAL REFLUX DISEASE WITHOUT ESOPHAGITIS: ICD-10-CM

## 2024-09-11 LAB
ALBUMIN SERPL ELPH-MCNC: 4.1 G/DL — SIGNIFICANT CHANGE UP (ref 3.3–5)
ALP SERPL-CCNC: 67 U/L — SIGNIFICANT CHANGE UP (ref 40–120)
ALT FLD-CCNC: 30 U/L — SIGNIFICANT CHANGE UP (ref 12–78)
ANION GAP SERPL CALC-SCNC: 9 MMOL/L — SIGNIFICANT CHANGE UP (ref 5–17)
APTT BLD: 31.1 SEC — SIGNIFICANT CHANGE UP (ref 24.5–35.6)
AST SERPL-CCNC: 23 U/L — SIGNIFICANT CHANGE UP (ref 15–37)
BASOPHILS # BLD AUTO: 0.07 K/UL — SIGNIFICANT CHANGE UP (ref 0–0.2)
BASOPHILS NFR BLD AUTO: 0.8 % — SIGNIFICANT CHANGE UP (ref 0–2)
BILIRUB SERPL-MCNC: 0.7 MG/DL — SIGNIFICANT CHANGE UP (ref 0.2–1.2)
BUN SERPL-MCNC: 9 MG/DL — SIGNIFICANT CHANGE UP (ref 7–23)
CALCIUM SERPL-MCNC: 10.2 MG/DL — HIGH (ref 8.5–10.1)
CHLORIDE SERPL-SCNC: 99 MMOL/L — SIGNIFICANT CHANGE UP (ref 96–108)
CO2 SERPL-SCNC: 23 MMOL/L — SIGNIFICANT CHANGE UP (ref 22–31)
CREAT SERPL-MCNC: 1.01 MG/DL — SIGNIFICANT CHANGE UP (ref 0.5–1.3)
D DIMER BLD IA.RAPID-MCNC: <150 NG/ML DDU — SIGNIFICANT CHANGE UP
EGFR: 70 ML/MIN/1.73M2 — SIGNIFICANT CHANGE UP
EOSINOPHIL # BLD AUTO: 0.13 K/UL — SIGNIFICANT CHANGE UP (ref 0–0.5)
EOSINOPHIL NFR BLD AUTO: 1.5 % — SIGNIFICANT CHANGE UP (ref 0–6)
GLUCOSE SERPL-MCNC: 81 MG/DL — SIGNIFICANT CHANGE UP (ref 70–99)
HCG SERPL-ACNC: <1 MIU/ML — SIGNIFICANT CHANGE UP
HCT VFR BLD CALC: 46.5 % — HIGH (ref 34.5–45)
HGB BLD-MCNC: 15.5 G/DL — SIGNIFICANT CHANGE UP (ref 11.5–15.5)
IMM GRANULOCYTES NFR BLD AUTO: 0.2 % — SIGNIFICANT CHANGE UP (ref 0–0.9)
INR BLD: 1.04 RATIO — SIGNIFICANT CHANGE UP (ref 0.85–1.18)
LYMPHOCYTES # BLD AUTO: 1.36 K/UL — SIGNIFICANT CHANGE UP (ref 1–3.3)
LYMPHOCYTES # BLD AUTO: 15.9 % — SIGNIFICANT CHANGE UP (ref 13–44)
MCHC RBC-ENTMCNC: 29.9 PG — SIGNIFICANT CHANGE UP (ref 27–34)
MCHC RBC-ENTMCNC: 33.3 GM/DL — SIGNIFICANT CHANGE UP (ref 32–36)
MCV RBC AUTO: 89.8 FL — SIGNIFICANT CHANGE UP (ref 80–100)
MONOCYTES # BLD AUTO: 0.52 K/UL — SIGNIFICANT CHANGE UP (ref 0–0.9)
MONOCYTES NFR BLD AUTO: 6.1 % — SIGNIFICANT CHANGE UP (ref 2–14)
NEUTROPHILS # BLD AUTO: 6.46 K/UL — SIGNIFICANT CHANGE UP (ref 1.8–7.4)
NEUTROPHILS NFR BLD AUTO: 75.5 % — SIGNIFICANT CHANGE UP (ref 43–77)
PLATELET # BLD AUTO: 514 K/UL — HIGH (ref 150–400)
POTASSIUM SERPL-MCNC: 2.9 MMOL/L — CRITICAL LOW (ref 3.5–5.3)
POTASSIUM SERPL-SCNC: 2.9 MMOL/L — CRITICAL LOW (ref 3.5–5.3)
PROT SERPL-MCNC: 9 GM/DL — HIGH (ref 6–8.3)
PROTHROM AB SERPL-ACNC: 11.7 SEC — SIGNIFICANT CHANGE UP (ref 9.5–13)
RBC # BLD: 5.18 M/UL — SIGNIFICANT CHANGE UP (ref 3.8–5.2)
RBC # FLD: 13.4 % — SIGNIFICANT CHANGE UP (ref 10.3–14.5)
SODIUM SERPL-SCNC: 131 MMOL/L — LOW (ref 135–145)
TROPONIN I, HIGH SENSITIVITY RESULT: 3.21 NG/L — SIGNIFICANT CHANGE UP
WBC # BLD: 8.56 K/UL — SIGNIFICANT CHANGE UP (ref 3.8–10.5)
WBC # FLD AUTO: 8.56 K/UL — SIGNIFICANT CHANGE UP (ref 3.8–10.5)

## 2024-09-11 PROCEDURE — 93005 ELECTROCARDIOGRAM TRACING: CPT

## 2024-09-11 PROCEDURE — 99284 EMERGENCY DEPT VISIT MOD MDM: CPT

## 2024-09-11 PROCEDURE — 84702 CHORIONIC GONADOTROPIN TEST: CPT

## 2024-09-11 PROCEDURE — 96374 THER/PROPH/DIAG INJ IV PUSH: CPT | Mod: XU

## 2024-09-11 PROCEDURE — 71045 X-RAY EXAM CHEST 1 VIEW: CPT | Mod: 26

## 2024-09-11 PROCEDURE — 85610 PROTHROMBIN TIME: CPT

## 2024-09-11 PROCEDURE — 85730 THROMBOPLASTIN TIME PARTIAL: CPT

## 2024-09-11 PROCEDURE — 84484 ASSAY OF TROPONIN QUANT: CPT

## 2024-09-11 PROCEDURE — 85025 COMPLETE CBC W/AUTO DIFF WBC: CPT

## 2024-09-11 PROCEDURE — 99285 EMERGENCY DEPT VISIT HI MDM: CPT | Mod: 25

## 2024-09-11 PROCEDURE — 93010 ELECTROCARDIOGRAM REPORT: CPT

## 2024-09-11 PROCEDURE — 71045 X-RAY EXAM CHEST 1 VIEW: CPT

## 2024-09-11 PROCEDURE — 80053 COMPREHEN METABOLIC PANEL: CPT

## 2024-09-11 PROCEDURE — 36415 COLL VENOUS BLD VENIPUNCTURE: CPT

## 2024-09-11 PROCEDURE — 85379 FIBRIN DEGRADATION QUANT: CPT

## 2024-09-11 RX ORDER — KETOROLAC TROMETHAMINE 30 MG/ML
15 INJECTION, SOLUTION INTRAMUSCULAR ONCE
Refills: 0 | Status: DISCONTINUED | OUTPATIENT
Start: 2024-09-11 | End: 2024-09-11

## 2024-09-11 RX ORDER — POTASSIUM CHLORIDE 10 MEQ
40 TABLET, EXT RELEASE, PARTICLES/CRYSTALS ORAL ONCE
Refills: 0 | Status: COMPLETED | OUTPATIENT
Start: 2024-09-11 | End: 2024-09-11

## 2024-09-11 RX ADMIN — Medication 40 MILLIEQUIVALENT(S): at 13:22

## 2024-09-11 RX ADMIN — KETOROLAC TROMETHAMINE 15 MILLIGRAM(S): 30 INJECTION, SOLUTION INTRAMUSCULAR at 12:42

## 2024-09-11 NOTE — ED ADULT NURSE NOTE - NSICDXFAMILYHX_GEN_ALL_CORE_FT
Keep shield on eye until office visit today at 2 pm  Any problems call office at [phone]    OFFICE VISIT TODAY AT 2 pm  Bring Drops
FAMILY HISTORY:  Family history of breast cancer in female  Family history of renal cancer  Family history of type 2 diabetes mellitus    Grandparent  Still living? No  Family history of type 2 diabetes mellitus, Age at diagnosis: Age Unknown

## 2024-09-11 NOTE — ED STATDOCS - CLINICAL SUMMARY MEDICAL DECISION MAKING FREE TEXT BOX
This tech notified MD of stroke-like symptoms.    44 year-old with past medical history of HTN complaining of atraumatic L arm pain since last night, and presents today with chest pain as well. Called PMD who instructed her to come to ED. Pt denies cardiac history, and appears slightly anxious but other wise well-appearing. EKG is sinus tachycardic, will check labs including troponin and d-dimer.

## 2024-09-11 NOTE — ED STATDOCS - PATIENT PORTAL LINK FT
You can access the FollowMyHealth Patient Portal offered by [facility] by registering at the following website: [email or web address]. By joining [employer]’s FollowMyHealth portal, you will also be able to view your health information using other applications (apps) compatible with our system.

## 2024-09-11 NOTE — ED STATDOCS - PROGRESS NOTE DETAILS
Patient seen and evaluated, ED attending note and orders reviewed, will continue with patient follow up and care -An Arroyo PA-C labs WNL, trop and dimer negative, CXR with NAD, pt feeling better denies CP current, repeat VSS, will dc home with prompt outpt f/u.  At length discussion with patient regarding nature of the chest pain. Discussed results of all diagnostic testing in ED. Discussed chest pain precautions and instructions. Patient demonstrates understanding that a cardiac cause of the chest pain has not been totally excluded, but at this time there is no evidence to warrant an inpatient workup.  Discussed the importance of continued follow-up with Cardiology as outpatient to complete cardiac workup. Pt agreeable to dc and plan of care  An Arroyo PA-C

## 2024-09-11 NOTE — ED STATDOCS - OBJECTIVE STATEMENT
44 year-old female with past medical history of GERD, hypothyroidism presents complaining of chest pain since last night around midnight. No nausea/vomiting, no fever/chills. Pt also says L arm feels "heavy" and is in pain, "like it's in a vice ." Pt instructed by PMD to come to ED for further evaluation. No other complaints at this time.

## 2024-09-11 NOTE — ED STATDOCS - ATTENDING APP SHARED VISIT CONTRIBUTION OF CARE
I,Antwan Florian MD,  performed the initial face to face bedside interview with this patient regarding history of present illness, review of symptoms and relevant past medical, social and family history.  I completed an independent physical examination.  I was the initial provider who evaluated this patient. I have signed out the follow up of any pending tests (i.e. labs, radiological studies) to the ACP.  I have communicated the patient’s plan of care and disposition with the ACP.  The history, relevant review of systems, past medical and surgical history, medical decision making, and physical examination was documented by the scribe in my presence and I attest to the accuracy of the documentation.

## 2024-09-11 NOTE — ED ADULT NURSE NOTE - OBJECTIVE STATEMENT
Pain started at midnight last night with left arm heaviness and squeezing and then It radiated to chest.  Patient denies shortness of breath, diaphoresis.  Ptdescribes pain as having arm in a vice .

## 2024-09-11 NOTE — ED ADULT NURSE NOTE - NSFALLUNIVINTERV_ED_ALL_ED
Bed/Stretcher in lowest position, wheels locked, appropriate side rails in place/Call bell, personal items and telephone in reach/Instruct patient to call for assistance before getting out of bed/chair/stretcher/Non-slip footwear applied when patient is off stretcher/[place] to call system/Physically safe environment - no spills, clutter or unnecessary equipment/Purposeful proactive rounding/Room/bathroom lighting operational, light cord in reach

## 2024-09-11 NOTE — ED ADULT TRIAGE NOTE - CHIEF COMPLAINT QUOTE
PT presents to er with complaints of mid-sternal chest pain onset 9pm last night, takes HCTZ for HTN, states her left arm started to hurt at midnight last night, MD instructed pt to come in for cardiac workup. Pt did not take asa prior to arrival.

## 2024-09-11 NOTE — ED STATDOCS - NSICDXPASTMEDICALHX_GEN_ALL_CORE_FT
PAST MEDICAL HISTORY:  Abnormal uterine and vaginal bleeding, unspecified     Asthma Last asthma attack was years ago    Cervical incompetence     Cholelithiasis     GERD (gastroesophageal reflux disease)     Hydronephrosis     Hypothyroidism     IBS (irritable bowel syndrome)     Ovarian cyst     Renal calculus

## 2024-11-03 NOTE — ASU PATIENT PROFILE, ADULT - NSALCOHOLTYPE_GEN__A_CORE_SD
beer/wine PAST MEDICAL HISTORY:  Asthma controlled- no RX @ present    BPH (Benign Prostatic Hyperplasia)     CAD (Coronary Atherosclerotic Disease) myocardial infarction- 1988, s/p PCI-RCA stent- 8/6/07    Cardiac abnormality cardiac stent X1 2007    COPD (chronic obstructive pulmonary disease)     Dyslipidemia     Gastroesophageal reflux disease     GERD (Gastroesophageal Reflux Disease)     GI bleed 6/2013 pt stopped ASA    History of prostate cancer     HTN (Hypertension)     Hyperlipidemia     Hypertension     IBS (irritable bowel syndrome)     Impotence sexual     Insomnia     Male urinary stress incontinence     MI (myocardial infarction) 1988 s/p insert stent    Obstructive Sleep Apnea s/p UPP-2008 sleep studies -2008 refuses to use cpap.    Prostate cancer denies chemotherapy and radiation    Sleep apnea     Stress incontinence, male     Urinary incontinence

## 2024-11-07 ENCOUNTER — APPOINTMENT (OUTPATIENT)
Dept: INTERNAL MEDICINE | Facility: CLINIC | Age: 44
End: 2024-11-07
Payer: COMMERCIAL

## 2024-11-07 ENCOUNTER — NON-APPOINTMENT (OUTPATIENT)
Age: 44
End: 2024-11-07

## 2024-11-07 VITALS
HEART RATE: 91 BPM | BODY MASS INDEX: 28.03 KG/M2 | DIASTOLIC BLOOD PRESSURE: 74 MMHG | WEIGHT: 176.5 LBS | HEIGHT: 66.5 IN | SYSTOLIC BLOOD PRESSURE: 126 MMHG | TEMPERATURE: 99.9 F

## 2024-11-07 DIAGNOSIS — R79.9 ABNORMAL FINDING OF BLOOD CHEMISTRY, UNSPECIFIED: ICD-10-CM

## 2024-11-07 DIAGNOSIS — D50.9 IRON DEFICIENCY ANEMIA, UNSPECIFIED: ICD-10-CM

## 2024-11-07 DIAGNOSIS — J01.00 ACUTE MAXILLARY SINUSITIS, UNSPECIFIED: ICD-10-CM

## 2024-11-07 DIAGNOSIS — R53.83 OTHER FATIGUE: ICD-10-CM

## 2024-11-07 DIAGNOSIS — I10 ESSENTIAL (PRIMARY) HYPERTENSION: ICD-10-CM

## 2024-11-07 DIAGNOSIS — Z00.00 ENCOUNTER FOR GENERAL ADULT MEDICAL EXAMINATION W/OUT ABNORMAL FINDINGS: ICD-10-CM

## 2024-11-07 DIAGNOSIS — J45.30 MILD PERSISTENT ASTHMA, UNCOMPLICATED: ICD-10-CM

## 2024-11-07 PROCEDURE — G2211 COMPLEX E/M VISIT ADD ON: CPT | Mod: NC

## 2024-11-07 PROCEDURE — 99214 OFFICE O/P EST MOD 30 MIN: CPT

## 2024-11-07 RX ORDER — AZITHROMYCIN 250 MG/1
250 TABLET, FILM COATED ORAL
Qty: 1 | Refills: 0 | Status: ACTIVE | COMMUNITY
Start: 2024-11-07 | End: 1900-01-01

## 2024-12-09 ENCOUNTER — APPOINTMENT (OUTPATIENT)
Dept: OBGYN | Facility: CLINIC | Age: 44
End: 2024-12-09
Payer: COMMERCIAL

## 2024-12-09 DIAGNOSIS — J45.909 UNSPECIFIED ASTHMA, UNCOMPLICATED: ICD-10-CM

## 2024-12-09 DIAGNOSIS — D25.1 INTRAMURAL LEIOMYOMA OF UTERUS: ICD-10-CM

## 2024-12-09 PROCEDURE — 99215 OFFICE O/P EST HI 40 MIN: CPT

## 2024-12-09 PROCEDURE — 99459 PELVIC EXAMINATION: CPT

## 2025-01-06 ENCOUNTER — RX RENEWAL (OUTPATIENT)
Age: 45
End: 2025-01-06

## 2025-01-08 ENCOUNTER — APPOINTMENT (OUTPATIENT)
Dept: OBGYN | Facility: CLINIC | Age: 45
End: 2025-01-08
Payer: COMMERCIAL

## 2025-01-08 ENCOUNTER — OUTPATIENT (OUTPATIENT)
Dept: OUTPATIENT SERVICES | Facility: HOSPITAL | Age: 45
LOS: 1 days | End: 2025-01-08

## 2025-01-08 VITALS
OXYGEN SATURATION: 99 % | WEIGHT: 181 LBS | HEART RATE: 96 BPM | DIASTOLIC BLOOD PRESSURE: 80 MMHG | RESPIRATION RATE: 16 BRPM | SYSTOLIC BLOOD PRESSURE: 118 MMHG | HEIGHT: 66 IN | TEMPERATURE: 98 F

## 2025-01-08 DIAGNOSIS — J45.909 UNSPECIFIED ASTHMA, UNCOMPLICATED: ICD-10-CM

## 2025-01-08 DIAGNOSIS — D25.1 INTRAMURAL LEIOMYOMA OF UTERUS: ICD-10-CM

## 2025-01-08 DIAGNOSIS — I10 ESSENTIAL (PRIMARY) HYPERTENSION: ICD-10-CM

## 2025-01-08 DIAGNOSIS — N20.0 CALCULUS OF KIDNEY: Chronic | ICD-10-CM

## 2025-01-08 LAB
ANION GAP SERPL CALC-SCNC: 14 MMOL/L — SIGNIFICANT CHANGE UP (ref 7–14)
BUN SERPL-MCNC: 11 MG/DL — SIGNIFICANT CHANGE UP (ref 7–23)
CALCIUM SERPL-MCNC: 9.3 MG/DL — SIGNIFICANT CHANGE UP (ref 8.4–10.5)
CHLORIDE SERPL-SCNC: 102 MMOL/L — SIGNIFICANT CHANGE UP (ref 98–107)
CO2 SERPL-SCNC: 22 MMOL/L — SIGNIFICANT CHANGE UP (ref 22–31)
CREAT SERPL-MCNC: 0.87 MG/DL — SIGNIFICANT CHANGE UP (ref 0.5–1.3)
EGFR: 84 ML/MIN/1.73M2 — SIGNIFICANT CHANGE UP
GLUCOSE SERPL-MCNC: 110 MG/DL — HIGH (ref 70–99)
HCT VFR BLD CALC: 41.6 % — SIGNIFICANT CHANGE UP (ref 34.5–45)
HGB BLD-MCNC: 14 G/DL — SIGNIFICANT CHANGE UP (ref 11.5–15.5)
MCHC RBC-ENTMCNC: 30.3 PG — SIGNIFICANT CHANGE UP (ref 27–34)
MCHC RBC-ENTMCNC: 33.7 G/DL — SIGNIFICANT CHANGE UP (ref 32–36)
MCV RBC AUTO: 90 FL — SIGNIFICANT CHANGE UP (ref 80–100)
NRBC # BLD: 0 /100 WBCS — SIGNIFICANT CHANGE UP (ref 0–0)
NRBC # FLD: 0 K/UL — SIGNIFICANT CHANGE UP (ref 0–0)
PLATELET # BLD AUTO: 417 K/UL — HIGH (ref 150–400)
POTASSIUM SERPL-MCNC: 3.7 MMOL/L — SIGNIFICANT CHANGE UP (ref 3.5–5.3)
POTASSIUM SERPL-SCNC: 3.7 MMOL/L — SIGNIFICANT CHANGE UP (ref 3.5–5.3)
RBC # BLD: 4.62 M/UL — SIGNIFICANT CHANGE UP (ref 3.8–5.2)
RBC # FLD: 14.3 % — SIGNIFICANT CHANGE UP (ref 10.3–14.5)
SODIUM SERPL-SCNC: 138 MMOL/L — SIGNIFICANT CHANGE UP (ref 135–145)
WBC # BLD: 6.99 K/UL — SIGNIFICANT CHANGE UP (ref 3.8–10.5)
WBC # FLD AUTO: 6.99 K/UL — SIGNIFICANT CHANGE UP (ref 3.8–10.5)

## 2025-01-08 PROCEDURE — 99213 OFFICE O/P EST LOW 20 MIN: CPT

## 2025-01-08 RX ORDER — ALPRAZOLAM 0.25 MG/1
2 TABLET ORAL
Refills: 0 | DISCHARGE

## 2025-01-08 RX ORDER — HYDROCHLOROTHIAZIDE 25 MG/1
1 TABLET ORAL
Refills: 0 | DISCHARGE

## 2025-01-08 RX ORDER — SODIUM CHLORIDE 9 MG/ML
1000 INJECTION, SOLUTION INTRAVENOUS
Refills: 0 | Status: DISCONTINUED | OUTPATIENT
Start: 2025-01-14 | End: 2025-01-28

## 2025-01-08 RX ORDER — ALBUTEROL SULFATE 90 UG/1
2 INHALANT RESPIRATORY (INHALATION)
Refills: 0 | DISCHARGE

## 2025-01-08 RX ORDER — FERROUS SULFATE 325(65) MG
1 TABLET ORAL
Refills: 0 | DISCHARGE

## 2025-01-08 RX ORDER — BUPROPION HYDROCHLORIDE 150 MG/1
1 TABLET, EXTENDED RELEASE ORAL
Refills: 0 | DISCHARGE

## 2025-01-08 NOTE — H&P PST ADULT - NSICDXPASTMEDICALHX_GEN_ALL_CORE_FT
PAST MEDICAL HISTORY:  Abnormal uterine and vaginal bleeding, unspecified     Anemia     Asthma Last asthma attack was years ago    Cervical incompetence     Cholelithiasis     GERD (gastroesophageal reflux disease)     HTN (hypertension)     Hydronephrosis     Hypothyroidism     IBS (irritable bowel syndrome)     Ovarian cyst     Renal calculus

## 2025-01-08 NOTE — H&P PST ADULT - PROBLEM SELECTOR PLAN 3
No recent exacerbation, no h/o intubation or hospitalization in the past   Patient instructed to continue  inhalers as prescribed.

## 2025-01-08 NOTE — H&P PST ADULT - CARDIOVASCULAR COMMENTS
h/o Chest pain and was at [place] ED on 9/11/24 - EKG  bmp, Labs WNL, Troponin and D dimer was negative- No evidence to warrant an in patient work up as ED notes and patient was advised to follow up outpatient   cardiology which she did not do, followed up with PCP, Serum K  was 2.9 at ED was supplement with oral  potassium.  patient  denied chest pain at discharged, and no reoccurrence .

## 2025-01-08 NOTE — H&P PST ADULT - HISTORY OF PRESENT ILLNESS
44 year old female with PMH of HTN, Asthma, anxiety and depression,   Hypothyroidism ( not on medications currently) presents to Presurgical testing with diagnosis of   intramural Leiomyoma of uterus scheduled for sonata procedure , endometrial ablation (Nova Sure) and IUD removal Olympus resectoscope on study. Patient with irregular periods and menorrhagia.

## 2025-01-08 NOTE — H&P PST ADULT - TRANSFUSION HX COMMENT, PROFILE
patient with h/o anemia had 2 units of PRBC , patient followed up with hematologist at that time , had 2 iron transfusions , now on oral iron supplement

## 2025-01-08 NOTE — H&P PST ADULT - PROBLEM SELECTOR PLAN 1
Patient tentatively scheduled for sonata procedure , endometrial ablation (Nova Sure) and IUD removal Olympus resectoscope on study on 1/14/25.  Pre-op instructions provided. Pt given verbal and written instructions with teach back on  pepcid. Pt verbalized understanding with return demonstration.   Patient instructed to take Norethindrone with a sip of water on the morning of procedure.   Urine cup provided for day of procedure for pregnancy test.     CBC, BMP -pending

## 2025-01-08 NOTE — H&P PST ADULT - EKG AND INTERPRETATION
9/11/2024 -  bmp - Done at [facility] 9/11/2024 -  bmp - Done at [facility] - [place]

## 2025-01-09 ENCOUNTER — NON-APPOINTMENT (OUTPATIENT)
Age: 45
End: 2025-01-09

## 2025-01-09 ENCOUNTER — APPOINTMENT (OUTPATIENT)
Dept: INTERNAL MEDICINE | Facility: CLINIC | Age: 45
End: 2025-01-09
Payer: COMMERCIAL

## 2025-01-09 VITALS
BODY MASS INDEX: 27.95 KG/M2 | HEIGHT: 66.5 IN | HEART RATE: 84 BPM | SYSTOLIC BLOOD PRESSURE: 114 MMHG | DIASTOLIC BLOOD PRESSURE: 87 MMHG | WEIGHT: 176 LBS

## 2025-01-09 DIAGNOSIS — N93.9 ABNORMAL UTERINE AND VAGINAL BLEEDING, UNSPECIFIED: ICD-10-CM

## 2025-01-09 DIAGNOSIS — I10 ESSENTIAL (PRIMARY) HYPERTENSION: ICD-10-CM

## 2025-01-09 DIAGNOSIS — Z01.818 ENCOUNTER FOR OTHER PREPROCEDURAL EXAMINATION: ICD-10-CM

## 2025-01-09 PROCEDURE — 93000 ELECTROCARDIOGRAM COMPLETE: CPT

## 2025-01-09 PROCEDURE — 99214 OFFICE O/P EST MOD 30 MIN: CPT

## 2025-01-13 NOTE — ASU PATIENT PROFILE, ADULT - PATIENT REPRESENTATIVE: ( YOU CAN CHOOSE ANY PERSON THAT CAN ASSIST YOU WITH YOUR HEALTH CARE PREFERENCES, DOES NOT HAVE TO BE A SPOUSE, IMMEDIATE FAMILY OR SIGNIFICANT OTHER/PARTNER)
Instructions from Dr. Sorensen:     Labs today, we will contact you with the results    Follow up TBD pending results.     You may receive a survey regarding today's appointment.  We would love to hear from you and appreciate your feedback if you could take the time to fill it out!     Declines

## 2025-01-13 NOTE — ASU PATIENT PROFILE, ADULT - FALL HARM RISK - UNIVERSAL INTERVENTIONS
Bed in lowest position, wheels locked, appropriate side rails in place/Call bell, personal items and telephone in reach/Instruct patient to call for assistance before getting out of bed or chair/Non-slip footwear when patient is out of bed/[place] to call system/Physically safe environment - no spills, clutter or unnecessary equipment/Purposeful Proactive Rounding/Room/bathroom lighting operational, light cord in reach

## 2025-01-14 ENCOUNTER — TRANSCRIPTION ENCOUNTER (OUTPATIENT)
Age: 45
End: 2025-01-14

## 2025-01-14 ENCOUNTER — APPOINTMENT (OUTPATIENT)
Dept: OBGYN | Facility: HOSPITAL | Age: 45
End: 2025-01-14

## 2025-01-14 ENCOUNTER — RESULT REVIEW (OUTPATIENT)
Age: 45
End: 2025-01-14

## 2025-01-14 ENCOUNTER — OUTPATIENT (OUTPATIENT)
Dept: OUTPATIENT SERVICES | Facility: HOSPITAL | Age: 45
LOS: 1 days | Discharge: HOME | End: 2025-01-14
Payer: COMMERCIAL

## 2025-01-14 VITALS
OXYGEN SATURATION: 98 % | HEART RATE: 72 BPM | RESPIRATION RATE: 14 BRPM | DIASTOLIC BLOOD PRESSURE: 89 MMHG | SYSTOLIC BLOOD PRESSURE: 135 MMHG

## 2025-01-14 VITALS
OXYGEN SATURATION: 98 % | TEMPERATURE: 99 F | WEIGHT: 181 LBS | DIASTOLIC BLOOD PRESSURE: 78 MMHG | RESPIRATION RATE: 16 BRPM | SYSTOLIC BLOOD PRESSURE: 114 MMHG | HEIGHT: 66 IN | HEART RATE: 93 BPM

## 2025-01-14 DIAGNOSIS — D25.1 INTRAMURAL LEIOMYOMA OF UTERUS: ICD-10-CM

## 2025-01-14 DIAGNOSIS — Z98.89 OTHER SPECIFIED POSTPROCEDURAL STATES: Chronic | ICD-10-CM

## 2025-01-14 DIAGNOSIS — N20.0 CALCULUS OF KIDNEY: Chronic | ICD-10-CM

## 2025-01-14 LAB — HCG UR QL: NEGATIVE — SIGNIFICANT CHANGE UP

## 2025-01-14 PROCEDURE — 58301 REMOVE INTRAUTERINE DEVICE: CPT

## 2025-01-14 PROCEDURE — 88300 SURGICAL PATH GROSS: CPT | Mod: 26

## 2025-01-14 PROCEDURE — 58580 TRANSCRV ABLTJ UTRN FIBRD RF: CPT

## 2025-01-14 PROCEDURE — 58353 ENDOMETR ABLATE THERMAL: CPT

## 2025-01-14 DEVICE — SONATA  RADIOFREQUENCY ABLATION HANDPIECE: Type: IMPLANTABLE DEVICE | Status: FUNCTIONAL

## 2025-01-14 DEVICE — NOVASURE V5: Type: IMPLANTABLE DEVICE | Status: FUNCTIONAL

## 2025-01-14 RX ORDER — ONDANSETRON 4 MG/1
4 TABLET ORAL ONCE
Refills: 0 | Status: DISCONTINUED | OUTPATIENT
Start: 2025-01-14 | End: 2025-01-28

## 2025-01-14 RX ORDER — OXYCODONE HCL 15 MG
1 TABLET ORAL
Qty: 0 | Refills: 0 | DISCHARGE

## 2025-01-14 RX ORDER — OXYCODONE HCL 15 MG
1 TABLET ORAL
Qty: 3 | Refills: 0
Start: 2025-01-14

## 2025-01-14 RX ORDER — FENTANYL 75 UG/H
25 PATCH, EXTENDED RELEASE TRANSDERMAL
Refills: 0 | Status: DISCONTINUED | OUTPATIENT
Start: 2025-01-14 | End: 2025-01-14

## 2025-01-14 RX ORDER — NORETHINDRONE 0.35 MG
2 TABLET ORAL
Refills: 0 | DISCHARGE

## 2025-01-14 RX ORDER — OXYCODONE HCL 15 MG
10 TABLET ORAL ONCE
Refills: 0 | Status: DISCONTINUED | OUTPATIENT
Start: 2025-01-14 | End: 2025-01-14

## 2025-01-14 RX ORDER — FENTANYL 75 UG/H
50 PATCH, EXTENDED RELEASE TRANSDERMAL
Refills: 0 | Status: DISCONTINUED | OUTPATIENT
Start: 2025-01-14 | End: 2025-01-14

## 2025-01-14 RX ORDER — ACETAMINOPHEN 80 MG/.8ML
3 SOLUTION/ DROPS ORAL
Qty: 0 | Refills: 0 | DISCHARGE

## 2025-01-14 RX ORDER — IBUPROFEN 200 MG
3 TABLET ORAL
Qty: 0 | Refills: 0 | DISCHARGE

## 2025-01-14 RX ADMIN — FENTANYL 25 MICROGRAM(S): 75 PATCH, EXTENDED RELEASE TRANSDERMAL at 12:00

## 2025-01-14 RX ADMIN — FENTANYL 50 MICROGRAM(S): 75 PATCH, EXTENDED RELEASE TRANSDERMAL at 11:30

## 2025-01-14 RX ADMIN — FENTANYL 50 MICROGRAM(S): 75 PATCH, EXTENDED RELEASE TRANSDERMAL at 11:10

## 2025-01-14 RX ADMIN — Medication 10 MILLIGRAM(S): at 12:16

## 2025-01-14 RX ADMIN — FENTANYL 25 MICROGRAM(S): 75 PATCH, EXTENDED RELEASE TRANSDERMAL at 11:43

## 2025-01-14 NOTE — ASU DISCHARGE PLAN (ADULT/PEDIATRIC) - ASU DC SPECIAL INSTRUCTIONSFT
Discharge Instructions:  1. Diet: advance as tolerated  2. Activity limited by:   - Nothing in vagina (bath, swim, intercourse, douching, tampons) for 2 weeks  3. Medications:   - Ibuprofen 800mg every 6 hours as needed for pain  - Acetaminophen 975 mg every 6 hours as needed for pain  4. Follow-up appointment - 2 weeks. Please call the office upon discharge to schedule your appointment if you do not have one already.   5. Precautions:  - Call the office or go to the ED if you have any of the followin) Fever >100.4 that does not resolve  2) Intractable pain  3) Heavy bleeding

## 2025-01-14 NOTE — ASU DISCHARGE PLAN (ADULT/PEDIATRIC) - FINANCIAL ASSISTANCE
[facility] provides services at a reduced cost to those who are determined to be eligible through [facility]’s financial assistance program. Information regarding [facility]’s financial assistance program can be found by going to [email or web address] or by calling [phone].

## 2025-01-14 NOTE — ASU PREOP CHECKLIST - ISOLATION PRECAUTIONS

## 2025-01-14 NOTE — BRIEF OPERATIVE NOTE - NSICDXBRIEFPROCEDURE_GEN_ALL_CORE_FT
PROCEDURES:  NovaSure endometrial ablation 14-Jan-2025 10:53:14  Diana Peoples  Radiofrequency ablation, uterine fibroid 14-Jan-2025 10:53:26  Diana Peoples  Exam under anesthesia, pelvic 14-Jan-2025 10:53:35  Diana Peoples   PROCEDURES:  NovaSure endometrial ablation 14-Jan-2025 10:53:14  Diana Peoples  Radiofrequency ablation, uterine fibroid 14-Jan-2025 10:53:26  Diana Peoples  Exam under anesthesia, pelvic 14-Jan-2025 10:53:35  Diana Peoples  Removal IUD 14-Jan-2025 12:51:37  Diana Peoples

## 2025-01-14 NOTE — ASU DISCHARGE PLAN (ADULT/PEDIATRIC) - CARE PROVIDER_API CALL
Sunil Luz  Obstetrics and Gynecology  [address] [place] [address]  Phone: [phone]  Fax: [phone]  Established Patient  Follow Up Time: 2 weeks

## 2025-01-14 NOTE — ASU DISCHARGE PLAN (ADULT/PEDIATRIC) - NURSING INSTRUCTIONS
You received IV Tylenol for pain management at 9:45AM. Please DO NOT take any Tylenol (Acetaminophen) containing products, such as Vicodin, Percocet, Excedrin, and cold medications for the next 6 hours (until 3:45PM). DO NOT TAKE MORE THAN 4000 MG OF TYLENOL in a 24 hour period. You received IV Toradol for pain management at 10:30AM. Please DO NOT take Motrin/Ibuprofen/Advil/Aleve/NSAIDs (Non-Steroidal Anti-Inflammatory Drugs) for the next 6 hours (until 4:30PM).

## 2025-01-14 NOTE — BRIEF OPERATIVE NOTE - OPERATION/FINDINGS
EUA: grossly normal appearing external genitalia. Uterus approximately 12 weeks in size, mobile, with nonpalpable adnexa bilaterally. Hymenal tag noted at 12 o'clock, otherwise grossly normal vagina. Cervix noted to be very anterior and caudal, difficult to appreciate entire face of cervix.  [facility] ultrasound showed 2 fibroids at one o'clock on anterior wall of uterus and three o'clock on posterior wall of uterus   EUA: grossly normal appearing external genitalia. Uterus approximately 12 weeks in size, mobile, with nonpalpable adnexa bilaterally. Hymenal tag noted at 12 o'clock, otherwise grossly normal vagina. Cervix noted to be very anterior and caudal, difficult to appreciate entire face of cervix.  [facility] ultrasound showed 2 fibroids:   1. approximately 3cm at one o'clock on anterior wall of uterus   2. approximately 3cm at three o'clock on posterior wall of uterus

## 2025-01-15 LAB — SURGICAL PATHOLOGY STUDY: SIGNIFICANT CHANGE UP

## 2025-01-16 RX ORDER — ALBUTEROL SULFATE 90 UG/1
108 (90 BASE) INHALANT RESPIRATORY (INHALATION)
Qty: 1 | Refills: 2 | Status: ACTIVE | COMMUNITY
Start: 2025-01-16 | End: 1900-01-01

## 2025-01-30 ENCOUNTER — APPOINTMENT (OUTPATIENT)
Dept: OBGYN | Facility: CLINIC | Age: 45
End: 2025-01-30
Payer: COMMERCIAL

## 2025-01-30 VITALS
HEIGHT: 66.5 IN | DIASTOLIC BLOOD PRESSURE: 88 MMHG | BODY MASS INDEX: 28.75 KG/M2 | WEIGHT: 181 LBS | SYSTOLIC BLOOD PRESSURE: 130 MMHG | HEART RATE: 93 BPM

## 2025-01-30 PROCEDURE — 99024 POSTOP FOLLOW-UP VISIT: CPT

## 2025-02-03 PROBLEM — D64.9 ANEMIA, UNSPECIFIED: Chronic | Status: ACTIVE | Noted: 2025-01-08

## 2025-02-03 PROBLEM — I10 ESSENTIAL (PRIMARY) HYPERTENSION: Chronic | Status: ACTIVE | Noted: 2025-01-08

## 2025-02-06 ENCOUNTER — APPOINTMENT (OUTPATIENT)
Dept: OBGYN | Facility: HOSPITAL | Age: 45
End: 2025-02-06
Payer: COMMERCIAL

## 2025-02-06 DIAGNOSIS — D25.1 INTRAMURAL LEIOMYOMA OF UTERUS: ICD-10-CM

## 2025-02-06 PROCEDURE — 99213 OFFICE O/P EST LOW 20 MIN: CPT | Mod: 95

## 2025-02-09 ENCOUNTER — INPATIENT (INPATIENT)
Facility: HOSPITAL | Age: 45
LOS: 1 days | Discharge: HOME | End: 2025-02-11
Attending: OBSTETRICS & GYNECOLOGY | Admitting: OBSTETRICS & GYNECOLOGY
Payer: COMMERCIAL

## 2025-02-09 VITALS
TEMPERATURE: 98 F | DIASTOLIC BLOOD PRESSURE: 73 MMHG | WEIGHT: 188.05 LBS | HEART RATE: 96 BPM | HEIGHT: 66 IN | OXYGEN SATURATION: 99 % | RESPIRATION RATE: 20 BRPM | SYSTOLIC BLOOD PRESSURE: 107 MMHG

## 2025-02-09 DIAGNOSIS — Z98.89 OTHER SPECIFIED POSTPROCEDURAL STATES: Chronic | ICD-10-CM

## 2025-02-09 DIAGNOSIS — N93.9 ABNORMAL UTERINE AND VAGINAL BLEEDING, UNSPECIFIED: ICD-10-CM

## 2025-02-09 DIAGNOSIS — N20.0 CALCULUS OF KIDNEY: Chronic | ICD-10-CM

## 2025-02-09 LAB
ALBUMIN SERPL ELPH-MCNC: 4.1 G/DL — SIGNIFICANT CHANGE UP (ref 3.3–5)
ALP SERPL-CCNC: 72 U/L — SIGNIFICANT CHANGE UP (ref 40–120)
ALT FLD-CCNC: 13 U/L — SIGNIFICANT CHANGE UP (ref 4–33)
ANION GAP SERPL CALC-SCNC: 14 MMOL/L — SIGNIFICANT CHANGE UP (ref 7–14)
APTT BLD: 30.3 SEC — SIGNIFICANT CHANGE UP (ref 24.5–35.6)
AST SERPL-CCNC: 12 U/L — SIGNIFICANT CHANGE UP (ref 4–32)
BASOPHILS # BLD AUTO: 0.05 K/UL — SIGNIFICANT CHANGE UP (ref 0–0.2)
BASOPHILS NFR BLD AUTO: 0.4 % — SIGNIFICANT CHANGE UP (ref 0–2)
BILIRUB SERPL-MCNC: 0.4 MG/DL — SIGNIFICANT CHANGE UP (ref 0.2–1.2)
BUN SERPL-MCNC: 16 MG/DL — SIGNIFICANT CHANGE UP (ref 7–23)
CALCIUM SERPL-MCNC: 9.7 MG/DL — SIGNIFICANT CHANGE UP (ref 8.4–10.5)
CHLORIDE SERPL-SCNC: 103 MMOL/L — SIGNIFICANT CHANGE UP (ref 98–107)
CO2 SERPL-SCNC: 24 MMOL/L — SIGNIFICANT CHANGE UP (ref 22–31)
CREAT SERPL-MCNC: 0.83 MG/DL — SIGNIFICANT CHANGE UP (ref 0.5–1.3)
EGFR: 89 ML/MIN/1.73M2 — SIGNIFICANT CHANGE UP
EOSINOPHIL # BLD AUTO: 0.16 K/UL — SIGNIFICANT CHANGE UP (ref 0–0.5)
EOSINOPHIL NFR BLD AUTO: 1.2 % — SIGNIFICANT CHANGE UP (ref 0–6)
GLUCOSE SERPL-MCNC: 89 MG/DL — SIGNIFICANT CHANGE UP (ref 70–99)
HCG SERPL-ACNC: <1 MIU/ML — SIGNIFICANT CHANGE UP
HCT VFR BLD CALC: 40.8 % — SIGNIFICANT CHANGE UP (ref 34.5–45)
HGB BLD-MCNC: 12.9 G/DL — SIGNIFICANT CHANGE UP (ref 11.5–15.5)
IANC: 10.45 K/UL — HIGH (ref 1.8–7.4)
IMM GRANULOCYTES NFR BLD AUTO: 0.4 % — SIGNIFICANT CHANGE UP (ref 0–0.9)
INR BLD: 1.05 RATIO — SIGNIFICANT CHANGE UP (ref 0.85–1.16)
LYMPHOCYTES # BLD AUTO: 1.48 K/UL — SIGNIFICANT CHANGE UP (ref 1–3.3)
LYMPHOCYTES # BLD AUTO: 11.5 % — LOW (ref 13–44)
MCHC RBC-ENTMCNC: 28.6 PG — SIGNIFICANT CHANGE UP (ref 27–34)
MCHC RBC-ENTMCNC: 31.6 G/DL — LOW (ref 32–36)
MCV RBC AUTO: 90.5 FL — SIGNIFICANT CHANGE UP (ref 80–100)
MONOCYTES # BLD AUTO: 0.72 K/UL — SIGNIFICANT CHANGE UP (ref 0–0.9)
MONOCYTES NFR BLD AUTO: 5.6 % — SIGNIFICANT CHANGE UP (ref 2–14)
NEUTROPHILS # BLD AUTO: 10.45 K/UL — HIGH (ref 1.8–7.4)
NEUTROPHILS NFR BLD AUTO: 80.9 % — HIGH (ref 43–77)
NRBC # BLD AUTO: 0 K/UL — SIGNIFICANT CHANGE UP (ref 0–0)
NRBC # BLD: 0 /100 WBCS — SIGNIFICANT CHANGE UP (ref 0–0)
NRBC # FLD: 0 K/UL — SIGNIFICANT CHANGE UP (ref 0–0)
NRBC BLD-RTO: 0 /100 WBCS — SIGNIFICANT CHANGE UP (ref 0–0)
PLATELET # BLD AUTO: 474 K/UL — HIGH (ref 150–400)
POTASSIUM SERPL-MCNC: 3.1 MMOL/L — LOW (ref 3.5–5.3)
POTASSIUM SERPL-SCNC: 3.1 MMOL/L — LOW (ref 3.5–5.3)
PROT SERPL-MCNC: 7.6 G/DL — SIGNIFICANT CHANGE UP (ref 6–8.3)
PROTHROM AB SERPL-ACNC: 12.5 SEC — SIGNIFICANT CHANGE UP (ref 9.9–13.4)
RBC # BLD: 4.51 M/UL — SIGNIFICANT CHANGE UP (ref 3.8–5.2)
RBC # FLD: 13.2 % — SIGNIFICANT CHANGE UP (ref 10.3–14.5)
SODIUM SERPL-SCNC: 141 MMOL/L — SIGNIFICANT CHANGE UP (ref 135–145)
WBC # BLD: 12.91 K/UL — HIGH (ref 3.8–10.5)
WBC # FLD AUTO: 12.91 K/UL — HIGH (ref 3.8–10.5)

## 2025-02-09 PROCEDURE — 99285 EMERGENCY DEPT VISIT HI MDM: CPT

## 2025-02-09 PROCEDURE — 76830 TRANSVAGINAL US NON-OB: CPT | Mod: 26

## 2025-02-09 PROCEDURE — 93971 EXTREMITY STUDY: CPT | Mod: 26,LT

## 2025-02-09 PROCEDURE — 74177 CT ABD & PELVIS W/CONTRAST: CPT | Mod: 26

## 2025-02-09 RX ORDER — KETOROLAC TROMETHAMINE 10 MG
15 TABLET ORAL ONCE
Refills: 0 | Status: DISCONTINUED | OUTPATIENT
Start: 2025-02-09 | End: 2025-02-09

## 2025-02-09 RX ORDER — ACETAMINOPHEN 160 MG/5ML
1000 SUSPENSION ORAL EVERY 6 HOURS
Refills: 0 | Status: DISCONTINUED | OUTPATIENT
Start: 2025-02-09 | End: 2025-02-10

## 2025-02-09 RX ORDER — MORPHINE SULFATE 60 MG/1
4 TABLET, FILM COATED, EXTENDED RELEASE ORAL ONCE
Refills: 0 | Status: DISCONTINUED | OUTPATIENT
Start: 2025-02-09 | End: 2025-02-09

## 2025-02-09 RX ORDER — KETOROLAC TROMETHAMINE 10 MG
30 TABLET ORAL EVERY 6 HOURS
Refills: 0 | Status: DISCONTINUED | OUTPATIENT
Start: 2025-02-09 | End: 2025-02-10

## 2025-02-09 RX ORDER — HYDROMORPHONE HYDROCHLORIDE 4 MG/ML
0.2 INJECTION, SOLUTION INTRAMUSCULAR; INTRAVENOUS; SUBCUTANEOUS EVERY 6 HOURS
Refills: 0 | Status: DISCONTINUED | OUTPATIENT
Start: 2025-02-09 | End: 2025-02-11

## 2025-02-09 RX ORDER — ACETAMINOPHEN 160 MG/5ML
1000 SUSPENSION ORAL ONCE
Refills: 0 | Status: COMPLETED | OUTPATIENT
Start: 2025-02-09 | End: 2025-02-09

## 2025-02-09 RX ORDER — POTASSIUM CHLORIDE 750 MG/1
40 TABLET, EXTENDED RELEASE ORAL ONCE
Refills: 0 | Status: COMPLETED | OUTPATIENT
Start: 2025-02-09 | End: 2025-02-09

## 2025-02-09 RX ADMIN — HYDROMORPHONE HYDROCHLORIDE 0.2 MILLIGRAM(S): 4 INJECTION, SOLUTION INTRAMUSCULAR; INTRAVENOUS; SUBCUTANEOUS at 22:33

## 2025-02-09 RX ADMIN — POTASSIUM CHLORIDE 40 MILLIEQUIVALENT(S): 750 TABLET, EXTENDED RELEASE ORAL at 19:46

## 2025-02-09 RX ADMIN — MORPHINE SULFATE 4 MILLIGRAM(S): 60 TABLET, FILM COATED, EXTENDED RELEASE ORAL at 13:11

## 2025-02-09 RX ADMIN — Medication 15 MILLIGRAM(S): at 15:45

## 2025-02-09 RX ADMIN — MORPHINE SULFATE 4 MILLIGRAM(S): 60 TABLET, FILM COATED, EXTENDED RELEASE ORAL at 15:45

## 2025-02-09 RX ADMIN — Medication 15 MILLIGRAM(S): at 14:11

## 2025-02-09 RX ADMIN — ACETAMINOPHEN 1000 MILLIGRAM(S): 160 SUSPENSION ORAL at 19:20

## 2025-02-09 RX ADMIN — HYDROMORPHONE HYDROCHLORIDE 0.2 MILLIGRAM(S): 4 INJECTION, SOLUTION INTRAMUSCULAR; INTRAVENOUS; SUBCUTANEOUS at 23:33

## 2025-02-09 RX ADMIN — ACETAMINOPHEN 400 MILLIGRAM(S): 160 SUSPENSION ORAL at 15:52

## 2025-02-09 NOTE — ED ADULT TRIAGE NOTE - CHIEF COMPLAINT QUOTE
pt c/o 4 days worsening pelvic pain with vaginal bleeding, passing granules  from vaginal bleeding uterine ablation. past med hx htn, depression, vaginal bleeding

## 2025-02-09 NOTE — H&P ADULT - ASSESSMENT
45yo F,  POD#26 from a sonata ablation for hx of AUB and SM fibroids presenting with persistent pelvic pain and vaginal discharge. Pt afebrile, Vitals wnl. Physical exam significant for moderate suprapubic tenderness to palpation. Pelvic exam with copious Mucus with tiny granules pooling in the posterior fornix. Negative CMT. Labs wnl. Pt otherwise clinically and hemodynamically stable. CTAP and TVUS c/w normal post-operative findings however patient remains with abnormal post-operative pain despite receiving pain medications in the ED. Suspect pain 2/2 to degenerating fibroid. Will admit for pain management.     Neuro: IV Tylenol, IV Toradol standing  Dilaudid PRN  CV: Hemodynamically stable  Pulm: Saturating well on room air, encourage oob/amb  GI: Tolerating regular diet  : voiding freely  ID: Expected vaginal discharge however vaginal culture sent. F/u results  Slightly elevated leukocytosis. WBC 12.9. Suspect 2/2 inflammation associated with degenerating fibroid. Low suspicion for infection at this time given presentation. Trend WBC  Heme: c/w SCDs for DVT ppx  Dispo: Continue routine post-op care    D/w attending physician, Dr. Natty Zavala PGY2     45yo F,  POD#26 from a sonata ablation for hx of AUB and SM fibroids presenting with persistent pelvic pain and vaginal discharge. Pt afebrile, Vitals wnl. Physical exam significant for moderate suprapubic tenderness to palpation. Pelvic exam with copious Mucus with tiny granules pooling in the posterior fornix. Negative CMT. Labs wnl. Pt otherwise clinically and hemodynamically stable. CTAP and TVUS c/w normal post-operative findings and signs of degenerating fibroid. Patient remains with abnormal post-operative pain despite receiving pain medications in the ED. Suspect pain 2/2 to degenerating fibroid. Will admit for pain management.     Neuro: IV Tylenol, IV Toradol standing  Dilaudid PRN  CV: Hemodynamically stable  Pulm: Saturating well on room air, encourage oob/amb  GI: Tolerating regular diet  : voiding freely  ID: Expected vaginal discharge however vaginal culture sent. F/u results  Slightly elevated leukocytosis. WBC 12.9. Suspect 2/2 inflammation associated with degenerating fibroid. Low suspicion for infection at this time given presentation. Trend WBC  Heme: c/w SCDs for DVT ppx  Dispo: Continue routine post-op care    D/w attending physician, Dr. Natty Zavala PGY2

## 2025-02-09 NOTE — CONSULT NOTE ADULT - SUBJECTIVE AND OBJECTIVE BOX
ARIADNE UPTON  44y  Female [id number]    HPI:  43yo G LMP unknown presenting with persistent pelvic pain and vaginal discharge. She is POD#26 from a sonata ablation for hx of AUB and SM fibroids. Pt reports her was initallly manage in the weeks leading from surgery, however in the last week she has been having intermitted sharp shooting contraction like lower abdominal pain. When it happens it is a 10/10, worsen with walking, standing or sitting up for too long. It typically improves with pain regimen of alternating tylenol combined with Motrin q6hrs. Since morning she states her pain has been persistent. She has been having some vaginal bleeding since the procedure but her pads are not saturated. She also complains of vaginal discharge in the last 3 days. She describes it as a mucus-like with tiny granules and foul-smelling. Pt reports she had not been sexually active. She received Morphine in the ED and reports some relief. She denies any chest pain, SOB, fevers, chills, N/V, bowel or urinary changes.      Name of GYN Physician: Dr Luz    POB:    Pgyn: Denies fibroids, cysts, endometriosis, STI's, Abnormal pap smears   PMHX:  PSHx:  Meds:  All:  Social History:  Denies smoking use, drug use, alcohol use.   +occasional social alcohol use    Vital Signs Last 24 Hrs  T(C): 36.9 (09 Feb 2025 11:53), Max: 36.9 (09 Feb 2025 11:53)  T(F): 98.4 (09 Feb 2025 11:53), Max: 98.4 (09 Feb 2025 11:53)  HR: 96 (09 Feb 2025 11:53) (96 - 96)  BP: 107/73 (09 Feb 2025 11:53) (107/73 - 107/73)  BP(mean): --  RR: 20 (09 Feb 2025 11:53) (20 - 20)  SpO2: 99% (09 Feb 2025 11:53) (99% - 99%)      Chaperone Dr. Marlow  Physical Exam:   General: laying comfortably in bed, in mild distress  HEENT: neck supple, full ROM  CV: appears well perfused  Lungs: saturating well on RA  Abd: Soft,  non-distended. Moderately tender in suprapubic area, no rebound tenderness or guarding   External labia wnl.  Bimanual exam with no cervical motion tenderness, uterus wnl, adnexa non palpable  and nontender bilaterally. No CMT Cervix closed  Speculum Exam: pooling of mucus-like fluid with tiny whittish granules, foul smelling. No active bleeding from os.    Ext: non-tender b/l, no edema     LABS:                              12.9   12.91 )-----------( 474      ( 09 Feb 2025 12:50 )             40.8     02-09    141  |  103  |  16  ----------------------------<  89  3.1[L]   |  24  |  0.83    Ca    9.7      09 Feb 2025 12:50    TPro  7.6  /  Alb  4.1  /  TBili  0.4  /  DBili  x   /  AST  12  /  ALT  13  /  AlkPhos  72  02-09    I&O's Detail    PT/INR - ( 09 Feb 2025 12:50 )   PT: 12.5 sec;   INR: 1.05 ratio         PTT - ( 09 Feb 2025 12:50 )  PTT:30.3 sec  Urinalysis Basic - ( 09 Feb 2025 12:50 )    Color: x / Appearance: x / SG: x / pH: x  Gluc: 89 mg/dL / Ketone: x  / Bili: x / Urobili: x   Blood: x / Protein: x / Nitrite: x   Leuk Esterase: x / RBC: x / WBC x   Sq Epi: x / Non Sq Epi: x / Bacteria: x        RADIOLOGY & ADDITIONAL STUDIES: ARIADNE UPTON  44y  Female [id number]    HPI:  43yo  LMP unknown presenting with persistent pelvic pain and vaginal discharge. She is POD#26 from a sonata ablation for hx of AUB and SM fibroids. Pt reports her was initallly manage in the weeks leading from surgery, however in the last week she has been having intermitted sharp shooting contraction like lower abdominal pain. When it happens it is a 10/10, worsen with walking, standing or sitting up for too long. It typically improves with pain regimen of alternating tylenol combined with Motrin q6hrs. Since morning she states her pain has been persistent. She has been having some vaginal bleeding since the procedure but her pads are not saturated. She also complains of vaginal discharge in the last 3 days. She describes it as a mucus-like with tiny granules and foul-smelling. Pt reports she had not been sexually active. She received Morphine in the ED and reports some relief. She denies any chest pain, SOB, fevers, chills, N/V, bowel or urinary changes.      Name of GYN Physician: Dr Luz    POB: C/Sx2, VBACx1  Pgyn: hx fibroids. Denies cysts, endometriosis, STI's, Abnormal pap smears   PMHX: HTN, Asthma  PSHx: [facility]ata Ablation, D&Cx1  Meds: Albuterol, HCTZ 25mg qd, Wellbutrin 300mg qd, iron, norethindrone, zepbound weekly  All: PCN (hives), Doxycycline (hives), Levaquin (hives)  Social History:  Denies smoking use, drug use, alcohol use.      Vital Signs Last 24 Hrs  T(C): 36.9 (2025 11:53), Max: 36.9 (2025 11:53)  T(F): 98.4 (2025 11:53), Max: 98.4 (2025 11:53)  HR: 96 (2025 11:53) (96 - 96)  BP: 107/73 (2025 11:53) (107/73 - 107/73)  BP(mean): --  RR: 20 (2025 11:53) (20 - 20)  SpO2: 99% (2025 11:53) (99% - 99%)      Chaperone Dr. Marlow  Physical Exam:   General: laying comfortably in bed, in mild distress  HEENT: neck supple, full ROM  CV: appears well perfused  Lungs: saturating well on RA  Abd: Soft,  non-distended. Moderately tender in suprapubic area, no rebound tenderness or guarding   External labia wnl.  Bimanual exam with no cervical motion tenderness, uterus wnl, adnexa non palpable  and nontender bilaterally. No CMT Cervix closed  Speculum Exam: pooling of mucus-like fluid with tiny whitish granules, foul smelling. No active bleeding from os.    Ext: non-tender b/l, no edema     LABS:                              12.9   12.91 )-----------( 474      ( 2025 12:50 )             40.8         141  |  103  |  16  ----------------------------<  89  3.1[L]   |  24  |  0.83    Ca    9.7      2025 12:50    TPro  7.6  /  Alb  4.1  /  TBili  0.4  /  DBili  x   /  AST  12  /  ALT  13  /  AlkPhos  72      I&O's Detail    PT/INR - ( 2025 12:50 )   PT: 12.5 sec;   INR: 1.05 ratio         PTT - ( 2025 12:50 )  PTT:30.3 sec  Urinalysis Basic - ( 2025 12:50 )    Color: x / Appearance: x / SG: x / pH: x  Gluc: 89 mg/dL / Ketone: x  / Bili: x / Urobili: x   Blood: x / Protein: x / Nitrite: x   Leuk Esterase: x / RBC: x / WBC x   Sq Epi: x / Non Sq Epi: x / Bacteria: x        RADIOLOGY & ADDITIONAL STUDIES:

## 2025-02-09 NOTE — ED PROVIDER NOTE - CLINICAL SUMMARY MEDICAL DECISION MAKING FREE TEXT BOX
Ele VARGAS: Patient is a 44-year-old female with a history of hypertension, GERD,  IBS, asthma, anemia s/p ablation for uterine fibroid, Removal IUD on 1/14/25 for abnormal uterine bleeding.  Patient reports she has had persistent bleeding since her procedure.  She  had follow-up with her surgeon about 2 weeks ago and a telehealth appointment 2 days ago.  She states that she has noticed a bad smell in the past 5 days.  She also reports passage of granules.  She states that she has been taking Tylenol and Motrin without any relief in her pain.  Denies any fevers or chills.  Patient states that she was advised to get an ultrasound this week and follow-up in person.  However with the persistent passage of granules and pain, patient came in for further evaluation.  She also reports left leg pain that feels like a pulling sensation.  No swelling.  No history of DVT/PE.  On exam, patient appears to be uncomfortable, tearful.  She has tenderness in her lower abdomen with voluntary guarding.  GYN is aware of patient and examined patient.  Pelvic exam was done in my presence.  Patient had tenderness in her left adnexa.  GYN obtained of vaginal swab.  Plan for labs, pain control, TVUS, CT abdomen pelvis.  Concern for postop complications versus expected postop pain.

## 2025-02-09 NOTE — CONSULT NOTE ADULT - CONSULT REQUESTED DATE/TIME
"""Informed patient that their cataract(s) are not visually significant or do not meet the criteria for ""
"""Stable to last exam. Will continue to monitor."""
09-Feb-2025 15:22

## 2025-02-09 NOTE — ED ADULT TRIAGE NOTE - NS ED TRIAGE AVPU SCALE
20
Alert-The patient is alert, awake and responds to voice. The patient is oriented to time, place, and person. The triage nurse is able to obtain subjective information.

## 2025-02-09 NOTE — CONSULT NOTE ADULT - ASSESSMENT
45yo F,  POD#26 from a sonata ablation for hx of AUB and SM fibroids presenting with persistent pelvic pain and vaginal discharge. Pt afebrile, Vitals wnl. Physical exam significant for moderate suprapubic tenderness to palpation. Pelvic exam with copious Mucus with tiny granules pooling in the posterior fornix. Negative CMT. Labs wnl. Pt otherwise clinically and hemodynamically stable    #Pelvic pain  - Differential includes degenerating fibroid vs postablation syndrome vs PID vs. TOA/pelvic abscess  - Toradol/tylenol PRN  - continue to observe  - F/u TVUS/CTAP    #Vaginal discharge  - Vaginal culture sent      Vanessa Eid PGY2  D/w Dr. Luz

## 2025-02-09 NOTE — ED ADULT NURSE REASSESSMENT NOTE - NS ED NURSE REASSESS COMMENT FT1
Pt. resting in bed, respirations even and unlabored, chest rise equal bilaterally. Pt. declined pain medication at this time. Pt. admitted to inpatient unit. Comfort measures provided, safety maintained throughout.

## 2025-02-09 NOTE — H&P ADULT - NSHPLABSRESULTS_GEN_ALL_CORE
LABS:                              12.9   12.91 )-----------( 474      ( 09 Feb 2025 12:50 )             40.8     02-09    141  |  103  |  16  ----------------------------<  89  3.1[L]   |  24  |  0.83    Ca    9.7      09 Feb 2025 12:50    TPro  7.6  /  Alb  4.1  /  TBili  0.4  /  DBili  x   /  AST  12  /  ALT  13  /  AlkPhos  72  02-09    I&O's Detail    PT/INR - ( 09 Feb 2025 12:50 )   PT: 12.5 sec;   INR: 1.05 ratio         PTT - ( 09 Feb 2025 12:50 )  PTT:30.3 sec  Urinalysis Basic - ( 09 Feb 2025 12:50 )    Color: x / Appearance: x / SG: x / pH: x  Gluc: 89 mg/dL / Ketone: x  / Bili: x / Urobili: x   Blood: x / Protein: x / Nitrite: x   Leuk Esterase: x / RBC: x / WBC x   Sq Epi: x / Non Sq Epi: x / Bacteria: x

## 2025-02-09 NOTE — ED PROVIDER NOTE - PHYSICAL EXAMINATION
Pelvic: Done by GYN resident in my presence.  Normal external genitalia.  Small amount of blood in vault.  Patient had adnexal tenderness, left greater than right.  See GYN note for further details.

## 2025-02-09 NOTE — PATIENT PROFILE ADULT - FALL HARM RISK - HARM RISK INTERVENTIONS

## 2025-02-09 NOTE — ED PROVIDER NOTE - OBJECTIVE STATEMENT
Ele VARGAS: Patient is a 44-year-old female with a history of hypertension, GERD,  IBS, asthma, anemia s/p ablation for uterine fibroid, Removal IUD on 1/14/25 for abnormal uterine bleeding Ele VARGAS: Patient is a 44-year-old female with a history of hypertension, GERD,  IBS, asthma, anemia s/p ablation for uterine fibroid, Removal IUD on 1/14/25 for abnormal uterine bleeding.  Patient reports she has had persistent bleeding since her procedure.  She  had follow-up with her surgeon about 2 weeks ago and a telehealth appointment 2 days ago.  She states that she has noticed a bad smell in the past 5 days.  She also reports passage of granules.  She states that she has been taking Tylenol and Motrin without any relief in her pain.  Denies any fevers or chills.  Patient states that she was advised to get an ultrasound this week and follow-up in person.  However with the persistent passage of granules and pain, patient came in for further evaluation.  She also reports left leg pain that feels like a pulling sensation.  No swelling.  No history of DVT/PE.

## 2025-02-09 NOTE — H&P ADULT - NSHPPHYSICALEXAM_GEN_ALL_CORE
Vital Signs Last 24 Hrs  T(C): 36.9 (09 Feb 2025 11:53), Max: 36.9 (09 Feb 2025 11:53)  T(F): 98.4 (09 Feb 2025 11:53), Max: 98.4 (09 Feb 2025 11:53)  HR: 96 (09 Feb 2025 11:53) (96 - 96)  BP: 107/73 (09 Feb 2025 11:53) (107/73 - 107/73)  BP(mean): --  RR: 20 (09 Feb 2025 11:53) (20 - 20)  SpO2: 99% (09 Feb 2025 11:53) (99% - 99%)      Chaperone Dr. Marlow  Physical Exam:   General: laying comfortably in bed, in mild distress  HEENT: neck supple, full ROM  CV: appears well perfused  Lungs: saturating well on RA  Abd: Soft,  non-distended. Moderately tender in suprapubic area, no rebound tenderness or guarding   External labia wnl.  Bimanual exam with no cervical motion tenderness, uterus wnl, adnexa non palpable  and nontender bilaterally. No CMT Cervix closed  Speculum Exam: pooling of mucus-like fluid with tiny whitish granules, foul smelling. No active bleeding from os.    Ext: non-tender b/l, no edema

## 2025-02-09 NOTE — ED ADULT NURSE REASSESSMENT NOTE - NS ED NURSE REASSESS COMMENT FT1
break coverage rn. received report from GEORGE perez. pt A&Ox4, pending CT scan results at this time. endorses pelvic pain but partially relieved since arrival. denies other medical complaints. safety maintianed

## 2025-02-09 NOTE — ED ADULT NURSE NOTE - OBJECTIVE STATEMENT
presents with vaginal bleeding and cramping s/p ablation one month ago. Patient reports bleeding occurs while urinating and does not saturate pads. Appears uncomfortable on assessment

## 2025-02-09 NOTE — H&P ADULT - BIRTH SEX
Female
Detail Level: Detailed
Plan: DTD: 6,300mg\\nTarget:  7,900-8,863mg  Confirmed in IPL[facility]
Otc Regimen: Gentle cleanser and lotion  SPF 50
Continue Regimen: Absorica 30mg BID

## 2025-02-09 NOTE — H&P ADULT - HISTORY OF PRESENT ILLNESS
ARIADNE UPTON  44y  Female [id number]    HPI:  43yo  LMP unknown presenting with persistent pelvic pain and vaginal discharge. She is POD#26 from a sonata ablation for hx of AUB and SM fibroids. Pt reports her was initallly manage in the weeks leading from surgery, however in the last week she has been having intermitted sharp shooting contraction like lower abdominal pain. When it happens it is a 10/10, worsen with walking, standing or sitting up for too long. It typically improves with pain regimen of alternating tylenol combined with Motrin q6hrs. Since morning she states her pain has been persistent. She has been having some vaginal bleeding since the procedure but her pads are not saturated. She also complains of vaginal discharge in the last 3 days. She describes it as a mucus-like with tiny granules and foul-smelling. Pt reports she had not been sexually active. She received Morphine in the ED and reports some relief. She denies any chest pain, SOB, fevers, chills, N/V, bowel or urinary changes.      Name of GYN Physician: Dr Luz    POB: C/Sx2, VBACx1  Pgyn: hx fibroids. Denies cysts, endometriosis, STI's, Abnormal pap smears   PMHX: HTN, Asthma  PSHx: Sonata Ablation, D&Cx1  Meds: Albuterol, HCTZ 25mg qd, Wellbutrin 300mg qd, iron, norethindrone, zepbound weekly  All: PCN (hives), Doxycycline (hives), Levaquin (hives)  Social History:  Denies smoking use, drug use, alcohol use.      ACC: [id number] EXAM:  CT ABDOMEN AND PELVIS IC   ORDERED BY: CONSUELO HALL     PROCEDURE DATE:  2025          INTERPRETATION:  CLINICAL INFORMATION: Lower abdominal pain and vaginal   bleeding status post endometrial ablation.    COMPARISON: CT abdomen and pelvis 2023.    CONTRAST/COMPLICATIONS:  IV Contrast: Omnipaque 350  90 cc administered   10 cc discarded  Oral Contrast: NONE  .    PROCEDURE:  CT of the Abdomen and Pelvis was performed.  Sagittal and coronal reformats were performed.    FINDINGS:  LOWER CHEST: Minimal bibasilar atelectasis.    LIVER: Within normal limits.  BILE DUCTS: Normal caliber.  GALLBLADDER: Cholecystectomy.  SPLEEN: Within normal limits.  PANCREAS: Within normal limits.  ADRENALS: Within normal limits.  KIDNEYS/URETERS: No hydronephrosis bilaterally. Nonobstructive right   intrarenal calculus. Right extrarenal pelvis. The ureters are   unremarkable.    BLADDER: Within normal limits.  REPRODUCTIVE ORGANS: Anteverted uterus. Moderate thickening of the   endometrial canal heterogeneous in appearance measuring higher than fluid   attenuation may represent hemorrhagic debris versus necrotic endometrial   tissue. Enhancement of the endometrial myometrial border with areas of   irregularity and small areas of focal defect of enhancement..    BOWEL: No bowel obstruction. Diverticulosis without evidence for   diverticulitis Appendix is normal.  PERITONEUM/RETROPERITONEUM: Within normal limits.  VESSELS: Within normal limits.  LYMPH NODES: No lymphadenopathy.  ABDOMINAL WALL: Small fat-containing umbilical hernia.  BONES: Within normal limits.    IMPRESSION:  Moderate thickening of the endometrial canal heterogeneous in appearance   measuring higher than fluid attenuation may represent hemorrhagic debris   versus necrotic endometrial tissue likely related to recent ablation.        --- End of Report ---          SHELLI ORTIZ MD; Resident Radiologist  This document has been electronically signed.  BRENDA VEGAS MD; Attending Radiologist  This document has been electronically signed. 2025  5:59PM      ACC: [id number] EXAM:  US TRANSVAGINAL   ORDERED BY: CONSUELO HALL     PROCEDURE DATE:  2025          INTERPRETATION:  CLINICAL INFORMATION: Vaginal bleeding and lower   abdominal pain status post endometrial ablation.    LMP: Unknown.    COMPARISON: Pelvic ultrasound 2023.    TECHNIQUE:  Endovaginal and transabdominal pelvic sonogram. Color and Spectral   Doppler was performed.    FINDINGS:  Uterus: 12.1 cm x 6.6 cm x 7.0 cm. Submucosal fibroid measuring 5.1 x 4.6   x 4.1 cm.  Endometrium: Limited evaluation.    Right ovary: Not visualized  Left ovary: 2.3 cm x 1.5 cm x 1.6 cm. Within normal limits. Normal   arterial and venous waveforms. Dominant follicle measuring 0.8 x 0.9 x 1   cm.    Fluid: None.    IMPRESSION:  Limited evaluation endometrium.  Submucosal fibroid measuring 5.1 x 4.6 x 4.1 cm        --- End of Report ---          SHELLI ORTIZ MD; Resident Radiologist  This document has been electronically signed.  BONY CRUZ MD; Attending Radiologist

## 2025-02-09 NOTE — ED ADULT NURSE REASSESSMENT NOTE - NS ED NURSE REASSESS COMMENT FT1
Pt. received from day GEORGE Kaur. Pt. A&OX4 and ambulatory c/o vaginal bleeding. Pt. medical hx GERD< IBS, uterine fibroids, HTN, anemia, hypothyroidism, Asthma. Pt. states that she has saturated one pad since arriving to the ED. Pt. endorses "heaviness" to the suprapubic region with pain and cramping. Pt. states pain and cramping is worse with ambulation. Pt. denies HA, dizziness, chest pain, SOB, urinary symptoms. diarrhea, and constipation. Comfort measures provided, safety maintained throughout.

## 2025-02-10 ENCOUNTER — TRANSCRIPTION ENCOUNTER (OUTPATIENT)
Age: 45
End: 2025-02-10

## 2025-02-10 LAB
APPEARANCE UR: ABNORMAL
BACTERIA # UR AUTO: ABNORMAL /HPF
BILIRUB UR-MCNC: NEGATIVE — SIGNIFICANT CHANGE UP
COLOR SPEC: YELLOW — SIGNIFICANT CHANGE UP
DIFF PNL FLD: ABNORMAL
GLUCOSE UR QL: NEGATIVE MG/DL — SIGNIFICANT CHANGE UP
HCT VFR BLD CALC: 35 % — SIGNIFICANT CHANGE UP (ref 34.5–45)
HGB BLD-MCNC: 11.7 G/DL — SIGNIFICANT CHANGE UP (ref 11.5–15.5)
KETONES UR-MCNC: 15 MG/DL
LEUKOCYTE ESTERASE UR-ACNC: ABNORMAL
MCHC RBC-ENTMCNC: 29.8 PG — SIGNIFICANT CHANGE UP (ref 27–34)
MCHC RBC-ENTMCNC: 33.4 G/DL — SIGNIFICANT CHANGE UP (ref 32–36)
MCV RBC AUTO: 89.3 FL — SIGNIFICANT CHANGE UP (ref 80–100)
NITRITE UR-MCNC: POSITIVE
NRBC # BLD AUTO: 0 K/UL — SIGNIFICANT CHANGE UP (ref 0–0)
NRBC # BLD: 0 /100 WBCS — SIGNIFICANT CHANGE UP (ref 0–0)
NRBC # FLD: 0 K/UL — SIGNIFICANT CHANGE UP (ref 0–0)
NRBC BLD-RTO: 0 /100 WBCS — SIGNIFICANT CHANGE UP (ref 0–0)
PH UR: 6 — SIGNIFICANT CHANGE UP (ref 5–8)
PLATELET # BLD AUTO: 431 K/UL — HIGH (ref 150–400)
PROT UR-MCNC: 30 MG/DL
RBC # BLD: 3.92 M/UL — SIGNIFICANT CHANGE UP (ref 3.8–5.2)
RBC # FLD: 12.9 % — SIGNIFICANT CHANGE UP (ref 10.3–14.5)
RBC CASTS # UR COMP ASSIST: SIGNIFICANT CHANGE UP /HPF (ref 0–4)
SP GR SPEC: 1.05 — HIGH (ref 1–1.03)
UROBILINOGEN FLD QL: 1 MG/DL — SIGNIFICANT CHANGE UP (ref 0.2–1)
WBC # BLD: 10.35 K/UL — SIGNIFICANT CHANGE UP (ref 3.8–10.5)
WBC # FLD AUTO: 10.35 K/UL — SIGNIFICANT CHANGE UP (ref 3.8–10.5)
WBC UR QL: >50 /HPF — SIGNIFICANT CHANGE UP (ref 0–5)

## 2025-02-10 RX ORDER — ACETAMINOPHEN 160 MG/5ML
975 SUSPENSION ORAL EVERY 6 HOURS
Refills: 0 | Status: DISCONTINUED | OUTPATIENT
Start: 2025-02-10 | End: 2025-02-11

## 2025-02-10 RX ORDER — CEFTRIAXONE 250 MG/1
1000 INJECTION, POWDER, FOR SOLUTION INTRAMUSCULAR; INTRAVENOUS EVERY 24 HOURS
Refills: 0 | Status: DISCONTINUED | OUTPATIENT
Start: 2025-02-10 | End: 2025-02-11

## 2025-02-10 RX ORDER — IBUPROFEN 600 MG/1
600 TABLET, FILM COATED ORAL EVERY 6 HOURS
Refills: 0 | Status: DISCONTINUED | OUTPATIENT
Start: 2025-02-10 | End: 2025-02-11

## 2025-02-10 RX ADMIN — ACETAMINOPHEN 1000 MILLIGRAM(S): 160 SUSPENSION ORAL at 14:28

## 2025-02-10 RX ADMIN — Medication 30 MILLIGRAM(S): at 14:28

## 2025-02-10 RX ADMIN — IBUPROFEN 600 MILLIGRAM(S): 600 TABLET, FILM COATED ORAL at 18:53

## 2025-02-10 RX ADMIN — Medication 30 MILLIGRAM(S): at 13:28

## 2025-02-10 RX ADMIN — ACETAMINOPHEN 400 MILLIGRAM(S): 160 SUSPENSION ORAL at 13:28

## 2025-02-10 RX ADMIN — Medication 30 MILLIGRAM(S): at 06:11

## 2025-02-10 RX ADMIN — ACETAMINOPHEN 975 MILLIGRAM(S): 160 SUSPENSION ORAL at 19:53

## 2025-02-10 RX ADMIN — Medication 30 MILLIGRAM(S): at 07:11

## 2025-02-10 RX ADMIN — ACETAMINOPHEN 400 MILLIGRAM(S): 160 SUSPENSION ORAL at 06:11

## 2025-02-10 RX ADMIN — IBUPROFEN 600 MILLIGRAM(S): 600 TABLET, FILM COATED ORAL at 19:53

## 2025-02-10 RX ADMIN — CEFTRIAXONE 100 MILLIGRAM(S): 250 INJECTION, POWDER, FOR SOLUTION INTRAMUSCULAR; INTRAVENOUS at 18:17

## 2025-02-10 RX ADMIN — ACETAMINOPHEN 1000 MILLIGRAM(S): 160 SUSPENSION ORAL at 07:11

## 2025-02-10 RX ADMIN — Medication 30 MILLIGRAM(S): at 00:45

## 2025-02-10 RX ADMIN — ACETAMINOPHEN 975 MILLIGRAM(S): 160 SUSPENSION ORAL at 18:53

## 2025-02-10 RX ADMIN — ACETAMINOPHEN 400 MILLIGRAM(S): 160 SUSPENSION ORAL at 00:45

## 2025-02-10 NOTE — DISCHARGE NOTE PROVIDER - NSDCCPCAREPLAN_GEN_ALL_CORE_FT
PRINCIPAL DISCHARGE DIAGNOSIS  Diagnosis: Degeneration of uterine fibroid  Assessment and Plan of Treatment:       SECONDARY DISCHARGE DIAGNOSES  Diagnosis: Acute UTI  Assessment and Plan of Treatment:

## 2025-02-10 NOTE — DISCHARGE NOTE PROVIDER - CARE PROVIDER_API CALL
Sunil Luz  Obstetrics and Gynecology  [address] [place] [address]  Phone: [phone]  Fax: [phone]  Established Patient  Follow Up Time: Routine

## 2025-02-10 NOTE — DISCHARGE NOTE PROVIDER - NSDCMRMEDTOKEN_GEN_ALL_CORE_FT
Albuterol (Eqv-ProAir HFA) 90 mcg/inh inhalation aerosol: 2 puff(s) inhaled prn as needed for  shortness of breath and/or wheezing  ferrous sulfate 324 mg (65 mg elemental iron) oral delayed release tablet: 1 tab(s) orally once a day Morning  hydroCHLOROthiazide 25 mg oral tablet: 1 tab(s) orally once a day Morning  ibuprofen 200 mg oral tablet: 3 tab(s) orally every 6 hours  OTC cough and cold medicine Every 4 hours, Started  1/5/25:   oxyCODONE 5 mg oral tablet: 1 tab(s) orally every 6 hours as needed for  severe pain  Tylenol 325 mg oral tablet: 3 tab(s) orally every 6 hours  Wellbutrin  mg/24 hours oral tablet, extended release: 1 tab(s) orally once a day  Xanax 0.5 mg oral tablet: 2 tab(s) orally once a day (at bedtime)   Albuterol (Eqv-ProAir HFA) 90 mcg/inh inhalation aerosol: 2 puff(s) inhaled prn as needed for  shortness of breath and/or wheezing  ferrous sulfate 324 mg (65 mg elemental iron) oral delayed release tablet: 1 tab(s) orally once a day Morning  hydroCHLOROthiazide 25 mg oral tablet: 1 tab(s) orally once a day Morning  ibuprofen 200 mg oral tablet: 3 tab(s) orally every 6 hours  Macrodantin 100 mg oral capsule: 1 cap(s) orally every 12 hours  OTC cough and cold medicine Every 4 hours, Started  1/5/25:   Tylenol 325 mg oral tablet: 3 tab(s) orally every 6 hours  Wellbutrin  mg/24 hours oral tablet, extended release: 1 tab(s) orally once a day  Xanax 0.5 mg oral tablet: 2 tab(s) orally once a day (at bedtime)

## 2025-02-10 NOTE — DISCHARGE NOTE PROVIDER - NSDCFUADDINST_GEN_ALL_CORE_FT
Take the antibiotics as prescribed. We will call you with the culture results in the event that your antibiotics have to be changed

## 2025-02-10 NOTE — PROGRESS NOTE ADULT - ASSESSMENT
45yo, HD#2/ POD#27 from a sonata ablation for AUB and SM fibroids admitted with persistent pelvic pain and vaginal discharge for which she was admitted for pain control in the setting of presumed degenerating fibroid (noted on CTAP and TVUS). Patient is currently utilizing Toradal and Acetaminophen for analgesia. VS have been otherwise reassuring without any fever. Nevertheless, patient endorsing sx that could be consistent with UTI (UA to be sent/ordered)      Neuro: IV Tylenol, IV Toradol standing  Dilaudid PRN  CV: Hemodynamically stable  Pulm: Saturating well on room air, encourage oob/amb  GI: Tolerating regular diet  : Voiding spontaneously. UA and UCx to be ordered  ID: Expected vaginal discharge however vaginal culture sent. F/u results (pending as a of 2/10)  - Initially slightly elevated leukocytosis of 12.9 now 10.35 this AM. Additionally afebrile   Heme: c/w SCDs for DVT ppx  Dispo: Continue inpatient care     Leonard Cortez, PGY-3  Obstetrics and Gynecology

## 2025-02-10 NOTE — PROGRESS NOTE ADULT - SUBJECTIVE AND OBJECTIVE BOX
Gyn Progress Note POD27#  HD#2    Subjective:   Patient seen and examined at bedside w/ GYN team    Reports some improvement in her pain, although from 10/10->7/10  Endorsing some discharge from below described as tissue (however, flushed in toilet at time of eval)  Denies any fevers, chills, chest pain, or shortness of breath. Is passing flatus and having BMs   However, does note dysuria that started yesterday with some urinary frequency     Objective:  T(F): 98.3 (02-10-25 @ 05:34), Max: 98.8 (02-09-25 @ 22:33)  HR: 79 (02-10-25 @ 05:34) (75 - 96)  BP: 108/77 (02-10-25 @ 05:34) (105/63 - 113/80)  RR: 18 (02-10-25 @ 05:34) (16 - 20)  SpO2: 99% (02-10-25 @ 05:34) (99% - 100%)  Wt(kg): --  I&O's Summary    09 Feb 2025 07:01  -  10 Feb 2025 07:00  --------------------------------------------------------  IN: 580 mL / OUT: 300 mL / NET: 280 mL      CAPILLARY BLOOD GLUCOSE          MEDICATIONS  (STANDING):  acetaminophen   IVPB .. 1000 milliGRAM(s) IV Intermittent every 6 hours  influenza   Vaccine 0.5 milliLiter(s) IntraMuscular once  ketorolac   Injectable 30 milliGRAM(s) IV Push every 6 hours    MEDICATIONS  (PRN):  HYDROmorphone  Injectable 0.2 milliGRAM(s) IV Push every 6 hours PRN Severe Pain (7 - 10)      Physical Exam:  Constitutional: No acute distress. Was resting in bed prior to evauation   CV: Regular rate and rhythm. No murmurs appreciated   Lungs: Clear to auscultation  bilaterally. No respiratory distress  Abdomen: Soft. Non-distended. Suprapubic and lower abdominal tenderness. No rebound or guarding   : Scant spotting on pad   Extremities: No calf tenderness bilaterally    LABS:  02-09    141    |  103    |  16     ----------------------------<  89     3.1[L]   |  24     |  0.83     Ca    9.7        09 Feb 2025 12:50    TPro  7.6    /  Alb  4.1    /  TBili  0.4    /  DBili  x      /  AST  12     /  ALT  13     /  AlkPhos  72     02-09        PT/INR - ( 09 Feb 2025 12:50 )   PT: 12.5 sec;   INR: 1.05 ratio         PTT - ( 09 Feb 2025 12:50 )  PTT:30.3 sec  Urinalysis Basic - ( 09 Feb 2025 12:50 )    Color: x / Appearance: x / SG: x / pH: x  Gluc: 89 mg/dL / Ketone: x  / Bili: x / Urobili: x   Blood: x / Protein: x / Nitrite: x   Leuk Esterase: x / RBC: x / WBC x   Sq Epi: x / Non Sq Epi: x / Bacteria: x

## 2025-02-10 NOTE — DISCHARGE NOTE PROVIDER - ATTENDING ATTESTATION STATEMENT
I have personally seen and examined the patient. I have collaborated with and supervised the bilateral edema

## 2025-02-10 NOTE — DISCHARGE NOTE PROVIDER - HOSPITAL COURSE
44y yo F  POD#26 from a sonata ablation for hx of AUB and SM fibroids presenting with persistent pelvic pain and vaginal discharge. Pt afebrile, Vitals wnl. Physical exam significant for moderate suprapubic tenderness to palpation. CTAP and TVUS c/w normal post-operative findings and signs of degenerating fibroid. Patient admitted for pain control     HD #0, pt continues to endorse abdominal pain and mild burning with urination. Pt continues on IV pain meds. UA with +nitrites and bacteria. Pt recieved one dose of Ceftriaxone.    POD#....., pt was discharged in stable condition, ambulating, tolerating po and voiding spontaneously. Pt to have close follow-up w/  [X] in clinic. 44y yo F  POD#26 from a sonata ablation for hx of AUB and SM fibroids presenting with persistent pelvic pain and vaginal discharge. Pt afebrile, Vitals wnl. Physical exam significant for moderate suprapubic tenderness to palpation. CTAP and TVUS c/w normal post-operative findings and signs of degenerating fibroid. Patient admitted for pain control     HD #1, pt was ultimately transitioned to PO analgesics. She additionally had urinary frequency with a UA concerning for UTI. She was started on Ceftriaxone as well. On HD#2, pain was controlled on PO analgesics. She was discharged home in stable condition. She was ambulating, tolerating PO, and voiding. She is have close follow up with Dr. Luz

## 2025-02-11 ENCOUNTER — TRANSCRIPTION ENCOUNTER (OUTPATIENT)
Age: 45
End: 2025-02-11

## 2025-02-11 VITALS
SYSTOLIC BLOOD PRESSURE: 135 MMHG | TEMPERATURE: 98 F | DIASTOLIC BLOOD PRESSURE: 88 MMHG | RESPIRATION RATE: 18 BRPM | HEART RATE: 82 BPM | OXYGEN SATURATION: 99 %

## 2025-02-11 LAB
HCT VFR BLD CALC: 40.3 % — SIGNIFICANT CHANGE UP (ref 34.5–45)
HGB BLD-MCNC: 13.2 G/DL — SIGNIFICANT CHANGE UP (ref 11.5–15.5)
MCHC RBC-ENTMCNC: 29.5 PG — SIGNIFICANT CHANGE UP (ref 27–34)
MCHC RBC-ENTMCNC: 32.8 G/DL — SIGNIFICANT CHANGE UP (ref 32–36)
MCV RBC AUTO: 90 FL — SIGNIFICANT CHANGE UP (ref 80–100)
NRBC # BLD AUTO: 0 K/UL — SIGNIFICANT CHANGE UP (ref 0–0)
NRBC # FLD: 0 K/UL — SIGNIFICANT CHANGE UP (ref 0–0)
NRBC BLD AUTO-RTO: 0 /100 WBCS — SIGNIFICANT CHANGE UP (ref 0–0)
PLATELET # BLD AUTO: 474 K/UL — HIGH (ref 150–400)
RBC # BLD: 4.48 M/UL — SIGNIFICANT CHANGE UP (ref 3.8–5.2)
RBC # FLD: 12.9 % — SIGNIFICANT CHANGE UP (ref 10.3–14.5)
WBC # BLD: 11.59 K/UL — HIGH (ref 3.8–10.5)
WBC # FLD AUTO: 11.59 K/UL — HIGH (ref 3.8–10.5)

## 2025-02-11 RX ADMIN — ACETAMINOPHEN 975 MILLIGRAM(S): 160 SUSPENSION ORAL at 06:35

## 2025-02-11 RX ADMIN — ACETAMINOPHEN 975 MILLIGRAM(S): 160 SUSPENSION ORAL at 01:56

## 2025-02-11 RX ADMIN — ACETAMINOPHEN 975 MILLIGRAM(S): 160 SUSPENSION ORAL at 00:56

## 2025-02-11 RX ADMIN — ACETAMINOPHEN 975 MILLIGRAM(S): 160 SUSPENSION ORAL at 13:44

## 2025-02-11 RX ADMIN — IBUPROFEN 600 MILLIGRAM(S): 600 TABLET, FILM COATED ORAL at 07:35

## 2025-02-11 RX ADMIN — IBUPROFEN 600 MILLIGRAM(S): 600 TABLET, FILM COATED ORAL at 13:44

## 2025-02-11 RX ADMIN — IBUPROFEN 600 MILLIGRAM(S): 600 TABLET, FILM COATED ORAL at 06:35

## 2025-02-11 RX ADMIN — IBUPROFEN 600 MILLIGRAM(S): 600 TABLET, FILM COATED ORAL at 01:56

## 2025-02-11 RX ADMIN — IBUPROFEN 600 MILLIGRAM(S): 600 TABLET, FILM COATED ORAL at 00:56

## 2025-02-11 NOTE — DISCHARGE NOTE NURSING/CASE MANAGEMENT/SOCIAL WORK - NSDCPEFALRISK_GEN_ALL_CORE
For information on Fall & Injury Prevention, visit: [email or web address] OR  [email or web address] OR  https://www.cdc.gov/steadi/patient.html

## 2025-02-11 NOTE — DISCHARGE NOTE NURSING/CASE MANAGEMENT/SOCIAL WORK - NSDCPNINST_GEN_ALL_CORE
Call MD with any signs of infection (i.e.: fever, redness, swelling, drainage), or with signs of bleeding, unrelieved increased pain, or persistent nausea. Continue to drink plenty of fluids to promote hydration. No heavy lifting, pulling, or pushing heavy objects. Teach back utilized in education process. Safety and fall prevention measures reinforced.

## 2025-02-11 NOTE — PROGRESS NOTE ADULT - SUBJECTIVE AND OBJECTIVE BOX
R2  HD#3 GYN Progress Note  Pt seen and examined at bedside in NAD. Denies any complaints. Pt reports pain well controlled overnight with oral medication. She is ambulating without difficulty. She denies any N/V, SOB/CP, dysuria, fevers or chills     Physical exam:    Vital Signs Last 24 Hrs  T(F): 98 (11 Feb 2025 04:45), Max: 98.3 (11 Feb 2025 00:54)  HR: 73 (11 Feb 2025 04:45) (73 - 84)  BP: 123/78 (11 Feb 2025 04:45) (119/82 - 138/94)  RR: 18 (11 Feb 2025 04:45) (16 - 18)  SpO2: 99% (11 Feb 2025 04:45) (98% - 100%)    Gen: alert, oriented  CVS: RRR  Lungs: CTAB  Abdomen: Soft, mildly-tender, non distended.   Perineum: mild spotting on pad  Ext: No calf tenderness    Diet:    MEDICATIONS  (STANDING):  acetaminophen     Tablet .. 975 milliGRAM(s) Oral every 6 hours  cefTRIAXone   IVPB 1000 milliGRAM(s) IV Intermittent every 24 hours  hydrochlorothiazide 25 milliGRAM(s) Oral daily  ibuprofen  Tablet. 600 milliGRAM(s) Oral every 6 hours  influenza   Vaccine 0.5 milliLiter(s) IntraMuscular once    MEDICATIONS  (PRN):  HYDROmorphone  Injectable 0.2 milliGRAM(s) IV Push every 6 hours PRN Severe Pain (7 - 10)      LABS:                        11.7   10.35 )-----------( 431      ( 10 Feb 2025 04:27 )             35.0                         12.9   12.91 )-----------( 474      ( 09 Feb 2025 12:50 )             40.8       02-09-25 @ 12:50      141  |  103  |  16  ----------------------------<  89  3.1[L]   |  24  |  0.83        Ca    9.7      09 Feb 2025 12:50    TPro  7.6  /  Alb  4.1  /  TBili  0.4  /  DBili  x   /  AST  12  /  ALT  13  /  AlkPhos  72  02-09-25 @ 12:50          Urinalysis with Rflx Culture (collected 02-10-25 @ 11:52)          02-09-25 @ 07:01  -  02-10-25 @ 07:00  --------------------------------------------------------  IN: 580 mL / OUT: 300 mL / NET: 280 mL    02-10-25 @ 07:01  -  02-11-25 @ 06:29  --------------------------------------------------------  IN: 1320 mL / OUT: 1450 mL / NET: -130 mL

## 2025-02-11 NOTE — DISCHARGE NOTE NURSING/CASE MANAGEMENT/SOCIAL WORK - PATIENT PORTAL LINK FT
You can access the FollowMyHealth Patient Portal offered by [facility] by registering at the following website: [email or web address]. By joining [employer]’s FollowMyHealth portal, you will also be able to view your health information using other applications (apps) compatible with our system.

## 2025-02-11 NOTE — PROGRESS NOTE ADULT - ASSESSMENT
45yo, HD#3/ POD#28 from a sonata ablation for AUB and SM fibroids admitted with persistent pelvic pain and vaginal discharge for which she was admitted for pain control in the setting of presumed degenerating fibroid (noted on CTAP and TVUS). Patient transitioned to PO pain medications yesterday and tolerated well. Dysuria resolved. Pt clinically stable, anticipating discharge today.    Neuro: PO Tylenol, Motrin standing  CV: Hemodynamically stable  Pulm: Saturating well on room air, encourage oob/amb  GI: Tolerating regular diet  : Voiding spontaneously.     ID: Afebrile  UA: +Nitrites, +bacteria  s/p Ceftriaxone (2/10)  f/u UCx (2/10)  f/u Vaginal cx (2/9)     Heme: c/w SCDs for DVT ppx  Dispo: Continue inpatient care     Vanessa Eid PGY2  Seen with GYN team  43yo, HD#3/ POD#28 from a sonata ablation for AUB and SM fibroids admitted with persistent pelvic pain and vaginal discharge for which she was admitted for pain control in the setting of presumed degenerating fibroid (noted on CTAP and TVUS). Patient transitioned to PO pain medications yesterday and tolerated well. Dysuria resolved. Pt clinically stable, anticipating discharge today.    Neuro: PO Tylenol, Motrin standing  CV: Hemodynamically stable  Pulm: Saturating well on room air, encourage oob/amb  GI: Tolerating regular diet  : Voiding spontaneously.     ID: Afebrile  UA: +Nitrites, +bacteria  s/p Ceftriaxone (2/10)  f/u UCx (2/10)  f/u Vaginal cx (2/9)     Heme: c/w SCDs for DVT ppx  Dispo: discharge planning    Vanessa Eid PGY2  Seen with GYN team

## 2025-02-11 NOTE — DISCHARGE NOTE NURSING/CASE MANAGEMENT/SOCIAL WORK - FINANCIAL ASSISTANCE
[facility] provides services at a reduced cost to those who are determined to be eligible through [facility]’s financial assistance program. Information regarding [facility]’s financial assistance program can be found by going to [email or web address] or by calling [phone].

## 2025-02-11 NOTE — DISCHARGE NOTE NURSING/CASE MANAGEMENT/SOCIAL WORK - NSDPACMPNY_GEN_ALL_CORE
admission and the blood cultures are negative. WBC today is 15. We are consulted for management and antibiotics     Interval [id number]/23/2018   No fever and no chills - passed the swallow study but still has difficulty swallowing her saliva  Still on steroids - extubated - Ox3  cx w mixed anaerobic harriet and normal harriet    11/25 ENT  Tried a flex laryngoscopy but was limited byt he secretions in the throat. Summary of relevant labs:  Labs:   W13 -9.8  Creat  . 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               I have personally reviewed the past medical history, past surgical history, medications, social history, and family history, and I haveupdated the database accordingly. Past Medical History:     Past Medical History:   Diagnosis Date    Bronchitis 2010    Hearing deficit     bilateral/ uses hearing aids       Past Surgical  History:     Past Surgical History:   Procedure Laterality Date    NH INC/DRAIN PERITONSIL ABSCESS N/A 11/24/2018    RETROPHARYNGEAL  ABSCESS INCISION AND DRAINAGE, DIRECT LARYNGOSCOPY performed by Clement Glaser MD at [place] [id number]       Medications:      insulin lispro  0-12 Units Subcutaneous Q6H    famotidine (PEPCID) injection  20 mg Intravenous BID    vancomycin (VANCOCIN) intermittent dosing (placeholder)   Other RX Placeholder    vancomycin  750 mg Intravenous Q12H    enoxaparin  30 mg Subcutaneous Daily    dexamethasone  10 mg Intravenous Q8H    sodium chloride flush  10 mL Intravenous 2 times per day    piperacillin-tazobactam  3.375 g Intravenous Q8H       Social History:     Social History     Social History    Marital status:      Spouse name: N/A    Number of children: N/A    Years of education: N/A     Occupational History    Not on file. Comments: Urine clear   Musculoskeletal: She exhibits no edema or deformity. Neurological: She is alert and oriented to person, place, and time. No cranial nerve deficit. Skin: Skin is warm and dry. Psychiatric: She has a normal mood and affect. Her behavior is normal.         Medical Decision Making:   I have independently reviewed/ordered the following labs:    CBC with Differential:   Recent Labs      11/25/18   0437  11/26/18   0746   WBC  9.8  8.8   HGB  11.2*  11.4*   HCT  34.5*  34.6*   PLT  192  232   LYMPHOPCT  6*  11*   MONOPCT  9*  6     BMP:  Recent Labs      11/25/18   0437  11/26/18   0746   NA  144  144   K  4.2  3.7   CL  111*  114*   CO2  24  22   BUN  22*  24*   CREATININE  0.36*  0.44*     Hepatic Function Panel:   No results for input(s): PROT, LABALBU, BILIDIR, IBILI, BILITOT, ALKPHOS, ALT, AST in the last 72 hours. No results for input(s): RPR in the last 72 hours. No results for input(s): HIV in the last 72 hours. No results for input(s): BC in the last 72 hours. Lab Results   Component Value Date    CREATININE 0.44 11/26/2018    GLUCOSE 133 11/26/2018       Detailed results: Thank you for allowing us to participate in the care of this patient. Please call with questions. This note is created with the assistance of a speech recognition program.  While intending to generate adocument that actually reflects the content of the visit, the document can still have some errors including those of syntax and sound a like substitutions which may escape proof reading. It such instances, actual meaningcan be extrapolated by contextual diversion.     Claudia Raya MD  Office: [phone] Spouse

## 2025-02-12 LAB
-  AMOXICILLIN/CLAVULANIC ACID: SIGNIFICANT CHANGE UP
-  AMPICILLIN/SULBACTAM: SIGNIFICANT CHANGE UP
-  AMPICILLIN: SIGNIFICANT CHANGE UP
-  AZTREONAM: SIGNIFICANT CHANGE UP
-  CEFAZOLIN: SIGNIFICANT CHANGE UP
-  CEFEPIME: SIGNIFICANT CHANGE UP
-  CEFOXITIN: SIGNIFICANT CHANGE UP
-  CEFTRIAXONE: SIGNIFICANT CHANGE UP
-  CEFUROXIME: SIGNIFICANT CHANGE UP
-  CIPROFLOXACIN: SIGNIFICANT CHANGE UP
-  ERTAPENEM: SIGNIFICANT CHANGE UP
-  GENTAMICIN: SIGNIFICANT CHANGE UP
-  IMIPENEM: SIGNIFICANT CHANGE UP
-  LEVOFLOXACIN: SIGNIFICANT CHANGE UP
-  MEROPENEM: SIGNIFICANT CHANGE UP
-  NITROFURANTOIN: SIGNIFICANT CHANGE UP
-  PIPERACILLIN/TAZOBACTAM: SIGNIFICANT CHANGE UP
-  TOBRAMYCIN: SIGNIFICANT CHANGE UP
-  TRIMETHOPRIM/SULFAMETHOXAZOLE: SIGNIFICANT CHANGE UP
CULTURE RESULTS: ABNORMAL
METHOD TYPE: SIGNIFICANT CHANGE UP
ORGANISM # SPEC MICROSCOPIC CNT: ABNORMAL
ORGANISM # SPEC MICROSCOPIC CNT: ABNORMAL
SPECIMEN SOURCE: SIGNIFICANT CHANGE UP

## 2025-03-12 NOTE — H&P PST ADULT - NS PRO TALK SOMEONE YN
pt states he has been off of his mental health medication x 1 month. denies suicidal/homicidal ideations.
no

## 2025-04-08 NOTE — ASU DISCHARGE PLAN (ADULT/PEDIATRIC) - PHYSICIAN SECTION COMPLETE
Problem: Adult Inpatient Plan of Care  Goal: Plan of Care Review  Outcome: Progressing  Goal: Readiness for Transition of Care  Outcome: Progressing     Problem: Diabetes Comorbidity  Goal: Blood Glucose Level Within Targeted Range  Outcome: Progressing     Problem: UTI (Urinary Tract Infection)  Goal: Improved Infection Symptoms  Outcome: Progressing      Yes

## 2025-05-29 NOTE — PATIENT PROFILE ADULT - FALL HARM RISK - DEVICES
RN called and spoke to pt about scheduling the Watchman procedure, pt offered 7/15 or 7/18, pt states she would like to discuss with her children for assistance with transportation on either day. Pt to call back and confirm.        
None

## 2025-06-24 NOTE — ED ADULT TRIAGE NOTE - ARRIVAL FROM
"PHYSICAL THERAPY EVALUATION  Type of Visit: Evaluation       Fall Risk Screen:  Have you fallen 2 or more times in the past year?: No  Have you fallen and had an injury in the past year?: No    Subjective         Presenting condition or subjective complaint: Bulging abdominal muscles when bending over, pushing with certain activities.  Pt presents with complaints of laxity and weakness of abdominal wall following breast flap reconstruction and hysterectomy. Feels abdomen \"pop out\" when she bends over or sits down and goes to the bathroom. Was screened for hernia and did not find anything,   Date of onset: 05/27/25    Relevant medical history: Cancer; Depression; Menopause   Dates & types of surgery: 10/2005 - Tubal ligation ; 07/2015 - s/p LEEP of cervix; 2017 - wisdom tooth removal; 05/2023 - mastectomy; 08/2023 - breast expander removal, 09/2023 - Judi flap breast reconstruction; 03/2024 - complete hysterectomy,  oopherectomy.    Prior diagnostic imaging/testing results: MRI; CT scan; X-ray; Bone scan      CT 3/7/25 :  IMPRESSION:   1.  There is mild asymmetric thickening of the left rectus abdominis musculature, new since the previous exam, but with no associated intramuscular mass or fluid collection. This finding is of uncertain clinical significance, but could be related to   postoperative changes in the anterior abdominal wall.  2.  No other cause for left abdominal bulging is identified  Prior therapy history for the same diagnosis, illness or injury: No        Living Environment  Social support: With a significant other or spouse   Type of home: House   Stairs to enter the home: Yes 1 Is there a railing: Yes     Ramp: No   Stairs inside the home: Yes 15 Is there a railing: Yes     Help at home: None  Equipment owned:       Employment: Yes   Hobbies/Interests: Riding bike, golfing, lake activities, reading books, spending time with family and friends    Patient goals for therapy: " Learn exercises to strengthen abdominal muscles    Pain assessment: Pain present     Objective   LUMBAR SPINE EVALUATION  PAIN: Pain Quality: Pressure  Worse with : going to the bathroom, bending over  INTEGUMENTARY (edema, incisions): Lower abdominal incision horizontally from hip to hip- decreased motility of skin and scar tissue noted    STRENGTH: Decreased TA activation in supine lying    FUNCTIONAL TESTS: pt in supine lying- breathing heavily through chest     Assessment & Plan   CLINICAL IMPRESSIONS  Medical Diagnosis: Laxity of abdominal wall    Treatment Diagnosis: Laxity of abdominal wall (decreased core strength and activation s/p abdominal surgery)   Impression/Assessment: Patient is a 45 year old female with laxity of abdominal wall complaints.  The following significant findings have been identified: Pain, Decreased strength, Impaired muscle performance, Decreased activity tolerance, and Impaired posture. These impairments interfere with their ability to perform self care tasks, work tasks, recreational activities, and household chores as compared to previous level of function.     Clinical Decision Making (Complexity):  Clinical Presentation: Stable/Uncomplicated  Clinical Presentation Rationale: based on medical and personal factors listed in PT evaluation  Clinical Decision Making (Complexity): Low complexity    PLAN OF CARE  Treatment Interventions:  Modalities: Biofeedback, Cupping, E-stim, Ultrasound  Interventions: Gait Training, Manual Therapy, Neuromuscular Re-education, Therapeutic Activity, Therapeutic Exercise, Self-Care/Home Management    Long Term Goals     PT Goal 1  Goal Identifier: ADL  Goal Description: Pt will be able to perform dressing and self care activities without increase in abdominal pain  Rationale: to maximize safety and independence with performance of ADLs and functional tasks  Target Date: 07/29/25  PT Goal 2  Goal Identifier: return to exercise  Goal Description: Pt will  be able to return to moderate level exercise without increase in abdominal pain  Rationale: to maximize safety and independence with performance of ADLs and functional tasks  Target Date: 09/02/25      Frequency of Treatment: 1x/week  Duration of Treatment: 10 weeks      Education Assessment:   Learner/Method: Patient;Listening;Reading;Demonstration;Pictures/Video;No Barriers to Learning  Education Comments: Educated pt on presenting problem, plan of care, and HEP    Risks and benefits of evaluation/treatment have been explained.   Patient/Family/caregiver agrees with Plan of Care.     Evaluation Time:     PT Eval, Low Complexity Minutes (39737): 18     Signing Clinician: Maris Lyles PT             Home

## 2025-07-01 ENCOUNTER — APPOINTMENT (OUTPATIENT)
Dept: ORTHOPEDIC SURGERY | Facility: CLINIC | Age: 45
End: 2025-07-01

## 2025-09-08 ENCOUNTER — APPOINTMENT (OUTPATIENT)
Dept: OBGYN | Facility: CLINIC | Age: 45
End: 2025-09-08
Payer: COMMERCIAL

## 2025-09-08 VITALS
WEIGHT: 198 LBS | HEART RATE: 86 BPM | BODY MASS INDEX: 31.44 KG/M2 | HEIGHT: 66.5 IN | SYSTOLIC BLOOD PRESSURE: 129 MMHG | DIASTOLIC BLOOD PRESSURE: 90 MMHG

## 2025-09-08 DIAGNOSIS — N93.9 ABNORMAL UTERINE AND VAGINAL BLEEDING, UNSPECIFIED: ICD-10-CM

## 2025-09-08 PROCEDURE — 99214 OFFICE O/P EST MOD 30 MIN: CPT

## 2025-09-08 PROCEDURE — 99459 PELVIC EXAMINATION: CPT

## 2025-09-08 RX ORDER — BUPROPION HYDROCHLORIDE 450 MG/1
450 TABLET, FILM COATED, EXTENDED RELEASE ORAL DAILY
Qty: 30 | Refills: 3 | Status: ACTIVE | COMMUNITY
Start: 2025-09-08 | End: 1900-01-01

## 2025-09-08 RX ORDER — NORETHINDRONE ACETATE 5 MG/1
5 TABLET ORAL
Qty: 60 | Refills: 5 | Status: ACTIVE | COMMUNITY
Start: 2025-09-08 | End: 1900-01-01

## (undated) DEVICE — VENODYNE/SCD SLEEVE CALF MEDIUM

## (undated) DEVICE — SOL IRR POUR H2O 500ML

## (undated) DEVICE — POSITIONER STRAP ARMBOARD VELCRO TS-30

## (undated) DEVICE — PREP BETADINE KIT

## (undated) DEVICE — DRAPE LIGHT HANDLE COVER (GREEN)

## (undated) DEVICE — ELECTRODE SONATA DISPERSIVE SNGL USE

## (undated) DEVICE — DRSG PAD SANITARY OB

## (undated) DEVICE — VISITEC 4X4

## (undated) DEVICE — PACK D&C

## (undated) DEVICE — GOWN LG

## (undated) DEVICE — BASIN SET DOUBLE

## (undated) DEVICE — PREP DYNA-HEX CHG 4% 4OZ BOTTLE (BACTOSHIELD)

## (undated) DEVICE — PROTECTOR HEEL / ELBOW FLUFFY

## (undated) DEVICE — DRAPE GYN IRRIGATION POUCH 19 X 23"

## (undated) DEVICE — SOL IRR POUR NS 0.9% 1000ML

## (undated) DEVICE — LABELS BLANK W PEN

## (undated) DEVICE — TUBING SUCTION NONCONDUCTIVE 6MM X 12FT

## (undated) DEVICE — DRSG TELFA 3 X 8

## (undated) DEVICE — MYOSURE SCOPE SEAL

## (undated) DEVICE — TUBING IRR SET FOR CYSTOSCOPY 77"

## (undated) DEVICE — FLUENT FMS PROCEDURE KIT

## (undated) DEVICE — GLV 8 PROTEXIS (BLUE)

## (undated) DEVICE — DRAPE TOWEL BLUE 17" X 24"

## (undated) DEVICE — PRESSURE INFUSOR BAG 1000ML

## (undated) DEVICE — LIJ/LIA-NOVASURE MACHINE #1 CSP8612: Type: DURABLE MEDICAL EQUIPMENT

## (undated) DEVICE — WARMING BLANKET FULL ADULT

## (undated) DEVICE — GLV 7.5 PROTEXIS (WHITE)

## (undated) DEVICE — PREP TRAY DRY SKIN PREP SCRUB